# Patient Record
Sex: MALE | Race: BLACK OR AFRICAN AMERICAN | NOT HISPANIC OR LATINO | Employment: OTHER | URBAN - METROPOLITAN AREA
[De-identification: names, ages, dates, MRNs, and addresses within clinical notes are randomized per-mention and may not be internally consistent; named-entity substitution may affect disease eponyms.]

---

## 2023-11-21 DIAGNOSIS — E55.9 VITAMIN D DEFICIENCY: Primary | ICD-10-CM

## 2023-11-21 DIAGNOSIS — E11.69 TYPE 2 DIABETES MELLITUS WITH OTHER SPECIFIED COMPLICATION, WITH LONG-TERM CURRENT USE OF INSULIN (MULTI): Primary | ICD-10-CM

## 2023-11-21 DIAGNOSIS — Z79.4 TYPE 2 DIABETES MELLITUS WITH OTHER SPECIFIED COMPLICATION, WITH LONG-TERM CURRENT USE OF INSULIN (MULTI): Primary | ICD-10-CM

## 2023-11-21 RX ORDER — ERGOCALCIFEROL 1.25 MG/1
1 CAPSULE ORAL
Qty: 12 CAPSULE | Refills: 0 | Status: SHIPPED | OUTPATIENT
Start: 2023-11-21 | End: 2023-12-05

## 2023-11-21 RX ORDER — INSULIN DETEMIR 100 [IU]/ML
INJECTION, SOLUTION SUBCUTANEOUS
Qty: 1 EACH | Refills: 3 | Status: SHIPPED | OUTPATIENT
Start: 2023-11-21 | End: 2024-01-15 | Stop reason: SDUPTHER

## 2023-11-23 DIAGNOSIS — N40.0 BENIGN PROSTATIC HYPERPLASIA WITHOUT LOWER URINARY TRACT SYMPTOMS: ICD-10-CM

## 2023-12-04 DIAGNOSIS — E10.69 TYPE 1 DIABETES MELLITUS WITH OTHER SPECIFIED COMPLICATION (MULTI): ICD-10-CM

## 2023-12-04 DIAGNOSIS — E55.9 VITAMIN D DEFICIENCY: ICD-10-CM

## 2023-12-04 RX ORDER — TAMSULOSIN HYDROCHLORIDE 0.4 MG/1
0.4 CAPSULE ORAL DAILY
Qty: 90 CAPSULE | Refills: 3 | Status: SHIPPED | OUTPATIENT
Start: 2023-12-04 | End: 2024-01-25 | Stop reason: SDUPTHER

## 2023-12-04 NOTE — TELEPHONE ENCOUNTER
Patient also requesting Rx for HumaLOG KwikPen (100 UNIT/ML Solution Pen-injector)20 units with a meal Three times a day , Subcutaneous to [facility].

## 2023-12-05 RX ORDER — ERGOCALCIFEROL 1.25 MG/1
1 CAPSULE ORAL
Qty: 12 CAPSULE | Refills: 0 | Status: SHIPPED | OUTPATIENT
Start: 2023-12-05 | End: 2024-01-25 | Stop reason: SDUPTHER

## 2023-12-05 RX ORDER — INSULIN LISPRO 100 [IU]/ML
INJECTION, SOLUTION INTRAVENOUS; SUBCUTANEOUS
Qty: 5 EACH | Refills: 1 | Status: SHIPPED | OUTPATIENT
Start: 2023-12-05 | End: 2024-01-25 | Stop reason: SDUPTHER

## 2024-01-02 PROBLEM — E10.9 TYPE 1 DIABETES MELLITUS (MULTI): Status: ACTIVE | Noted: 2024-01-02

## 2024-01-02 PROBLEM — I73.9 PERIPHERAL VASCULAR DISEASE (CMS-HCC): Status: ACTIVE | Noted: 2024-01-02

## 2024-01-02 PROBLEM — I87.2 VENOUS INSUFFICIENCY: Status: ACTIVE | Noted: 2024-01-02

## 2024-01-02 PROBLEM — Z86.73 HISTORY OF ISCHEMIC STROKE: Status: ACTIVE | Noted: 2024-01-02

## 2024-01-02 PROBLEM — E11.9 TYPE 2 DIABETES MELLITUS (MULTI): Status: ACTIVE | Noted: 2024-01-02

## 2024-01-02 PROBLEM — G47.33 OSA (OBSTRUCTIVE SLEEP APNEA): Status: ACTIVE | Noted: 2024-01-02

## 2024-01-02 PROBLEM — N40.0 BENIGN PROSTATIC HYPERPLASIA WITHOUT URINARY OBSTRUCTION: Status: ACTIVE | Noted: 2024-01-02

## 2024-01-02 PROBLEM — N18.9 CHRONIC KIDNEY DISEASE: Status: ACTIVE | Noted: 2024-01-02

## 2024-01-02 PROBLEM — D47.2 MONOCLONAL GAMMOPATHY OF UNDETERMINED SIGNIFICANCE: Status: ACTIVE | Noted: 2024-01-02

## 2024-01-02 PROBLEM — E66.9 OBESITY: Status: ACTIVE | Noted: 2024-01-02

## 2024-01-02 PROBLEM — I25.10 CORONARY ARTERY DISEASE: Status: ACTIVE | Noted: 2024-01-02

## 2024-01-02 PROBLEM — I67.9 CEREBROVASCULAR DISEASE: Status: ACTIVE | Noted: 2024-01-02

## 2024-01-02 PROBLEM — M15.0 PRIMARY OSTEOARTHRITIS INVOLVING MULTIPLE JOINTS: Status: ACTIVE | Noted: 2024-01-02

## 2024-01-02 PROBLEM — K75.81 NONALCOHOLIC STEATOHEPATITIS (NASH): Status: ACTIVE | Noted: 2024-01-02

## 2024-01-02 PROBLEM — E55.9 VITAMIN D DEFICIENCY: Status: ACTIVE | Noted: 2024-01-02

## 2024-01-02 PROBLEM — E78.2 MIXED HYPERLIPIDEMIA: Status: ACTIVE | Noted: 2024-01-02

## 2024-01-02 PROBLEM — I10 ESSENTIAL HYPERTENSION: Status: ACTIVE | Noted: 2024-01-02

## 2024-01-02 PROBLEM — M15.9 PRIMARY OSTEOARTHRITIS INVOLVING MULTIPLE JOINTS: Status: ACTIVE | Noted: 2024-01-02

## 2024-01-02 PROBLEM — R53.81 PHYSICAL DEBILITY: Status: ACTIVE | Noted: 2024-01-02

## 2024-01-02 PROBLEM — I25.2 HISTORY OF ST ELEVATION MYOCARDIAL INFARCTION (STEMI): Status: ACTIVE | Noted: 2024-01-02

## 2024-01-13 DIAGNOSIS — Z79.4 TYPE 2 DIABETES MELLITUS WITH OTHER SPECIFIED COMPLICATION, WITH LONG-TERM CURRENT USE OF INSULIN (MULTI): ICD-10-CM

## 2024-01-13 DIAGNOSIS — E11.69 TYPE 2 DIABETES MELLITUS WITH OTHER SPECIFIED COMPLICATION, WITH LONG-TERM CURRENT USE OF INSULIN (MULTI): ICD-10-CM

## 2024-01-15 RX ORDER — INSULIN DETEMIR 100 [IU]/ML
INJECTION, SOLUTION SUBCUTANEOUS
Qty: 15 ML | Refills: 1 | Status: SHIPPED | OUTPATIENT
Start: 2024-01-15 | End: 2024-01-25 | Stop reason: SDUPTHER

## 2024-01-25 ENCOUNTER — LAB (OUTPATIENT)
Dept: LAB | Facility: LAB | Age: 69
End: 2024-01-25
Payer: MEDICARE

## 2024-01-25 ENCOUNTER — OFFICE VISIT (OUTPATIENT)
Dept: PRIMARY CARE | Facility: CLINIC | Age: 69
End: 2024-01-25
Payer: MEDICARE

## 2024-01-25 VITALS
WEIGHT: 291 LBS | OXYGEN SATURATION: 95 % | BODY MASS INDEX: 36.37 KG/M2 | DIASTOLIC BLOOD PRESSURE: 66 MMHG | SYSTOLIC BLOOD PRESSURE: 134 MMHG | TEMPERATURE: 98.1 F | HEART RATE: 87 BPM

## 2024-01-25 DIAGNOSIS — G47.33 OSA (OBSTRUCTIVE SLEEP APNEA): ICD-10-CM

## 2024-01-25 DIAGNOSIS — Z01.89 ENCOUNTER FOR ROUTINE LABORATORY TESTING: ICD-10-CM

## 2024-01-25 DIAGNOSIS — I67.9 CEREBROVASCULAR DISEASE: ICD-10-CM

## 2024-01-25 DIAGNOSIS — Z12.5 ENCOUNTER FOR PROSTATE CANCER SCREENING: ICD-10-CM

## 2024-01-25 DIAGNOSIS — M15.9 PRIMARY OSTEOARTHRITIS INVOLVING MULTIPLE JOINTS: ICD-10-CM

## 2024-01-25 DIAGNOSIS — K75.81 NONALCOHOLIC STEATOHEPATITIS (NASH): ICD-10-CM

## 2024-01-25 DIAGNOSIS — R53.81 PHYSICAL DEBILITY: ICD-10-CM

## 2024-01-25 DIAGNOSIS — Z79.4 LONG TERM CURRENT USE OF INSULIN (MULTI): ICD-10-CM

## 2024-01-25 DIAGNOSIS — I10 ESSENTIAL HYPERTENSION: ICD-10-CM

## 2024-01-25 DIAGNOSIS — N18.4 STAGE 4 CHRONIC KIDNEY DISEASE (MULTI): ICD-10-CM

## 2024-01-25 DIAGNOSIS — I25.10 CORONARY ARTERY DISEASE INVOLVING NATIVE CORONARY ARTERY OF NATIVE HEART WITHOUT ANGINA PECTORIS: ICD-10-CM

## 2024-01-25 DIAGNOSIS — E78.2 MIXED HYPERLIPIDEMIA: ICD-10-CM

## 2024-01-25 DIAGNOSIS — E66.01 CLASS 2 SEVERE OBESITY WITH SERIOUS COMORBIDITY AND BODY MASS INDEX (BMI) OF 36.0 TO 36.9 IN ADULT, UNSPECIFIED OBESITY TYPE (MULTI): ICD-10-CM

## 2024-01-25 DIAGNOSIS — E55.9 VITAMIN D DEFICIENCY: ICD-10-CM

## 2024-01-25 DIAGNOSIS — E10.69 TYPE 1 DIABETES MELLITUS WITH OTHER SPECIFIED COMPLICATION (MULTI): Primary | ICD-10-CM

## 2024-01-25 DIAGNOSIS — N40.0 BENIGN PROSTATIC HYPERPLASIA WITHOUT LOWER URINARY TRACT SYMPTOMS: ICD-10-CM

## 2024-01-25 LAB
ALBUMIN SERPL-MCNC: 3.9 G/DL (ref 3.5–5)
ALP BLD-CCNC: 90 U/L (ref 35–125)
ALT SERPL-CCNC: 6 U/L (ref 5–40)
ANION GAP SERPL CALC-SCNC: 10 MMOL/L
AST SERPL-CCNC: 11 U/L (ref 5–40)
BASOPHILS # BLD AUTO: 0.04 X10*3/UL (ref 0–0.1)
BASOPHILS NFR BLD AUTO: 0.5 %
BILIRUB DIRECT SERPL-MCNC: <0.2 MG/DL (ref 0–0.2)
BILIRUB SERPL-MCNC: 0.4 MG/DL (ref 0.1–1.2)
BUN SERPL-MCNC: 25 MG/DL (ref 8–25)
CALCIUM SERPL-MCNC: 8.7 MG/DL (ref 8.5–10.4)
CHLORIDE SERPL-SCNC: 104 MMOL/L (ref 97–107)
CO2 SERPL-SCNC: 26 MMOL/L (ref 24–31)
CREAT SERPL-MCNC: 2.3 MG/DL (ref 0.4–1.6)
EGFRCR SERPLBLD CKD-EPI 2021: 30 ML/MIN/1.73M*2
EOSINOPHIL # BLD AUTO: 0.45 X10*3/UL (ref 0–0.7)
EOSINOPHIL NFR BLD AUTO: 5.3 %
ERYTHROCYTE [DISTWIDTH] IN BLOOD BY AUTOMATED COUNT: 13.9 % (ref 11.5–14.5)
GLUCOSE SERPL-MCNC: 202 MG/DL (ref 65–99)
HCT VFR BLD AUTO: 42 % (ref 41–52)
HGB BLD-MCNC: 13.4 G/DL (ref 13.5–17.5)
IMM GRANULOCYTES # BLD AUTO: 0.02 X10*3/UL (ref 0–0.7)
IMM GRANULOCYTES NFR BLD AUTO: 0.2 % (ref 0–0.9)
LYMPHOCYTES # BLD AUTO: 1.88 X10*3/UL (ref 1.2–4.8)
LYMPHOCYTES NFR BLD AUTO: 22.2 %
MCH RBC QN AUTO: 28.7 PG (ref 26–34)
MCHC RBC AUTO-ENTMCNC: 31.9 G/DL (ref 32–36)
MCV RBC AUTO: 90 FL (ref 80–100)
MONOCYTES # BLD AUTO: 0.59 X10*3/UL (ref 0.1–1)
MONOCYTES NFR BLD AUTO: 7 %
NEUTROPHILS # BLD AUTO: 5.48 X10*3/UL (ref 1.2–7.7)
NEUTROPHILS NFR BLD AUTO: 64.8 %
NRBC BLD-RTO: 0 /100 WBCS (ref 0–0)
PLATELET # BLD AUTO: 211 X10*3/UL (ref 150–450)
POTASSIUM SERPL-SCNC: 4.6 MMOL/L (ref 3.4–5.1)
PROT SERPL-MCNC: 7.6 G/DL (ref 5.9–7.9)
RBC # BLD AUTO: 4.67 X10*6/UL (ref 4.5–5.9)
SODIUM SERPL-SCNC: 140 MMOL/L (ref 133–145)
WBC # BLD AUTO: 8.5 X10*3/UL (ref 4.4–11.3)

## 2024-01-25 PROCEDURE — 36415 COLL VENOUS BLD VENIPUNCTURE: CPT

## 2024-01-25 PROCEDURE — 1036F TOBACCO NON-USER: CPT | Performed by: STUDENT IN AN ORGANIZED HEALTH CARE EDUCATION/TRAINING PROGRAM

## 2024-01-25 PROCEDURE — 3075F SYST BP GE 130 - 139MM HG: CPT | Performed by: STUDENT IN AN ORGANIZED HEALTH CARE EDUCATION/TRAINING PROGRAM

## 2024-01-25 PROCEDURE — 3078F DIAST BP <80 MM HG: CPT | Performed by: STUDENT IN AN ORGANIZED HEALTH CARE EDUCATION/TRAINING PROGRAM

## 2024-01-25 PROCEDURE — 1160F RVW MEDS BY RX/DR IN RCRD: CPT | Performed by: STUDENT IN AN ORGANIZED HEALTH CARE EDUCATION/TRAINING PROGRAM

## 2024-01-25 PROCEDURE — 99215 OFFICE O/P EST HI 40 MIN: CPT | Performed by: STUDENT IN AN ORGANIZED HEALTH CARE EDUCATION/TRAINING PROGRAM

## 2024-01-25 PROCEDURE — 1126F AMNT PAIN NOTED NONE PRSNT: CPT | Performed by: STUDENT IN AN ORGANIZED HEALTH CARE EDUCATION/TRAINING PROGRAM

## 2024-01-25 PROCEDURE — 4010F ACE/ARB THERAPY RXD/TAKEN: CPT | Performed by: STUDENT IN AN ORGANIZED HEALTH CARE EDUCATION/TRAINING PROGRAM

## 2024-01-25 PROCEDURE — 1159F MED LIST DOCD IN RCRD: CPT | Performed by: STUDENT IN AN ORGANIZED HEALTH CARE EDUCATION/TRAINING PROGRAM

## 2024-01-25 PROCEDURE — 3008F BODY MASS INDEX DOCD: CPT | Performed by: STUDENT IN AN ORGANIZED HEALTH CARE EDUCATION/TRAINING PROGRAM

## 2024-01-25 PROCEDURE — 80053 COMPREHEN METABOLIC PANEL: CPT

## 2024-01-25 PROCEDURE — 85025 COMPLETE CBC W/AUTO DIFF WBC: CPT

## 2024-01-25 PROCEDURE — 82248 BILIRUBIN DIRECT: CPT

## 2024-01-25 RX ORDER — PEN NEEDLE, DIABETIC 29 G X1/2"
NEEDLE, DISPOSABLE MISCELLANEOUS
COMMUNITY
Start: 2023-11-21

## 2024-01-25 RX ORDER — CLOPIDOGREL BISULFATE 75 MG/1
75 TABLET ORAL DAILY
Start: 2024-01-25

## 2024-01-25 RX ORDER — CLOPIDOGREL BISULFATE 75 MG/1
75 TABLET ORAL DAILY
COMMUNITY
Start: 2023-11-21 | End: 2024-01-25 | Stop reason: SDUPTHER

## 2024-01-25 RX ORDER — NAPROXEN SODIUM 220 MG/1
81 TABLET, FILM COATED ORAL DAILY
COMMUNITY
End: 2024-01-25 | Stop reason: SDUPTHER

## 2024-01-25 RX ORDER — ERGOCALCIFEROL 1.25 MG/1
1 CAPSULE ORAL
Start: 2024-01-25

## 2024-01-25 RX ORDER — INSULIN LISPRO 100 [IU]/ML
INJECTION, SOLUTION INTRAVENOUS; SUBCUTANEOUS
Start: 2024-01-25 | End: 2024-02-01 | Stop reason: SDUPTHER

## 2024-01-25 RX ORDER — MECLIZINE HYDROCHLORIDE 25 MG/1
25 TABLET ORAL AS NEEDED
COMMUNITY
Start: 2015-02-17

## 2024-01-25 RX ORDER — FINASTERIDE 5 MG/1
1 TABLET, FILM COATED ORAL DAILY
COMMUNITY
Start: 2021-10-06 | End: 2024-01-25 | Stop reason: SDUPTHER

## 2024-01-25 RX ORDER — KETOCONAZOLE 20 MG/G
CREAM TOPICAL AS NEEDED
COMMUNITY
Start: 2015-08-17

## 2024-01-25 RX ORDER — IRBESARTAN 300 MG/1
300 TABLET ORAL EVERY 24 HOURS
COMMUNITY
End: 2024-01-25 | Stop reason: SDUPTHER

## 2024-01-25 RX ORDER — IRBESARTAN 300 MG/1
300 TABLET ORAL DAILY
Start: 2024-01-25

## 2024-01-25 RX ORDER — BLOOD SUGAR DIAGNOSTIC
STRIP MISCELLANEOUS
COMMUNITY
Start: 2021-03-12

## 2024-01-25 RX ORDER — TRAMADOL HYDROCHLORIDE 50 MG/1
25 TABLET ORAL AS NEEDED
COMMUNITY
Start: 2022-12-01

## 2024-01-25 RX ORDER — LABETALOL 100 MG/1
1 TABLET, FILM COATED ORAL 2 TIMES DAILY
Start: 2024-01-25

## 2024-01-25 RX ORDER — INSULIN DETEMIR 100 [IU]/ML
70 INJECTION, SOLUTION SUBCUTANEOUS EVERY MORNING
Start: 2024-01-25 | End: 2024-02-01 | Stop reason: SDUPTHER

## 2024-01-25 RX ORDER — FINASTERIDE 5 MG/1
5 TABLET, FILM COATED ORAL DAILY
Start: 2024-01-25

## 2024-01-25 RX ORDER — LABETALOL 100 MG/1
1 TABLET, FILM COATED ORAL 2 TIMES DAILY
COMMUNITY
Start: 2023-01-26 | End: 2024-01-25 | Stop reason: SDUPTHER

## 2024-01-25 RX ORDER — TAMSULOSIN HYDROCHLORIDE 0.4 MG/1
0.4 CAPSULE ORAL DAILY
Start: 2024-01-25

## 2024-01-25 RX ORDER — NAPROXEN SODIUM 220 MG/1
81 TABLET, FILM COATED ORAL DAILY
Start: 2024-01-25

## 2024-01-25 ASSESSMENT — PAIN SCALES - GENERAL: PAINLEVEL: 0-NO PAIN

## 2024-01-25 ASSESSMENT — ENCOUNTER SYMPTOMS
OCCASIONAL FEELINGS OF UNSTEADINESS: 0
DEPRESSION: 0
RESPIRATORY NEGATIVE: 1
GASTROINTESTINAL NEGATIVE: 1
CONSTITUTIONAL NEGATIVE: 1
CARDIOVASCULAR NEGATIVE: 1
LOSS OF SENSATION IN FEET: 0

## 2024-01-25 ASSESSMENT — PATIENT HEALTH QUESTIONNAIRE - PHQ9
SUM OF ALL RESPONSES TO PHQ9 QUESTIONS 1 AND 2: 0
2. FEELING DOWN, DEPRESSED OR HOPELESS: NOT AT ALL
1. LITTLE INTEREST OR PLEASURE IN DOING THINGS: NOT AT ALL

## 2024-01-25 NOTE — PATIENT INSTRUCTIONS
Diagnoses and all orders for this visit:  Type 1 diabetes mellitus with other specified complication (CMS/[facility])  -     insulin lispro (HumaLOG KwikPen Insulin) 100 unit/mL injection; Inject 20 Units under the skin once daily with breakfast AND 20 Units once daily at noon. Take with meals AND 20 Units once daily in the evening. Take with meals. Take as directed per insulin instructions.  -     insulin detemir (Levemir FlexTouch U100 Insulin) 100 unit/mL (3 mL) pen; Inject 70 Units under the skin once daily in the morning. Take as directed per insulin instructions.  -     Referral to Endocrinology; Future  -     Hemoglobin A1C; Future  -     TSH with reflex to Free T4 if abnormal; Future  -     Albumin , Urine Random; Future  Long term current use of insulin (CMS/[facility])  -     Referral to Endocrinology; Future  Essential hypertension  -     irbesartan (Avapro) 300 mg tablet; Take 1 tablet (300 mg) by mouth once daily.  -     labetalol (Normodyne) 100 mg tablet; Take 1 tablet (100 mg) by mouth 2 times a day.  Mixed hyperlipidemia  -     Lipid Panel; Future  Class 2 severe obesity with serious comorbidity and body mass index (BMI) of 36.0 to 36.9 in adult, unspecified obesity type (CMS/[facility])  Benign prostatic hyperplasia without lower urinary tract symptoms  -     finasteride (Proscar) 5 mg tablet; Take 1 tablet (5 mg) by mouth once daily.  -     tamsulosin (Flomax) 0.4 mg 24 hr capsule; Take 1 capsule (0.4 mg) by mouth once daily.  Coronary artery disease involving native coronary artery of native heart without angina pectoris  -     aspirin 81 mg chewable tablet; Chew 1 tablet (81 mg) once daily.  -     clopidogrel (Plavix) 75 mg tablet; Take 1 tablet (75 mg) by mouth once daily.  -     irbesartan (Avapro) 300 mg tablet; Take 1 tablet (300 mg) by mouth once daily.  -     labetalol (Normodyne) 100 mg tablet; Take 1 tablet (100 mg) by mouth 2 times a day.  Primary osteoarthritis involving multiple joints  Stage 4 chronic  kidney disease (CMS/HCC)  -     irbesartan (Avapro) 300 mg tablet; Take 1 tablet (300 mg) by mouth once daily.  -     CBC and Auto Differential; Future  -     Basic Metabolic Panel; Future  -     CBC and Auto Differential; Future  -     Basic Metabolic Panel; Future  Nonalcoholic steatohepatitis (BADILLO)  -     Hepatic Function Panel; Future  JONATHAN (obstructive sleep apnea)  Cerebrovascular disease  -     aspirin 81 mg chewable tablet; Chew 1 tablet (81 mg) once daily.  -     clopidogrel (Plavix) 75 mg tablet; Take 1 tablet (75 mg) by mouth once daily.  Vitamin D deficiency  -     ergocalciferol (Vitamin D-2) 1.25 MG (12538 UT) capsule; Take 1 capsule (1,250 mcg) by mouth 1 (one) time per week.  -     Vitamin D 25-Hydroxy,Total (for eval of Vitamin D levels); Future  Physical debility  Encounter for routine laboratory testing  -     CBC and Auto Differential; Future  -     Basic Metabolic Panel; Future  -     Hepatic Function Panel; Future  -     CBC and Auto Differential; Future  -     Basic Metabolic Panel; Future  -     Hepatic Function Panel; Future  -     Lipid Panel; Future  -     Hemoglobin A1C; Future  -     Vitamin D 25-Hydroxy,Total (for eval of Vitamin D levels); Future  -     TSH with reflex to Free T4 if abnormal; Future  -     Prostate Specific Antigen; Future  -     Albumin , Urine Random; Future  -     Follow Up In Primary Care; Future  Encounter for prostate cancer screening  -     Prostate Specific Antigen; Future    - Significant medication and problem list reconciliation done today.  - Unclear whether or not the patient is taking his insulin correctly still. Prior to the visit, we had on documentation that the patient was to take humalog 35/35/35 alongside levemir 95u once/day; however, patient notes that he is instead been doing 25/25/25 and 60u.  - This discrepancy would explain the frequent daily low blood sugars that we are seeing on his CGM.   - Patient is becoming very difficult to treat. I  feel that he needs to see an endocrinologist for further input on his sugars. Referral placed.   - For now, will increase the basal insulin and lower the meal-time insulin to hopefully prevent the frequent low blood sugars. Patient agreeable to the plan.  - Blood pressure stable. No changes at this time.  - Encouraged continued diet and exercise modification.  - Patient notes that he is not wearing his CPAP at this time. Encouraged compliance with CPAP.  - Pain well-controlled. No changes at this time.  - Patient is due for labwork. Ordered today. Will also order labwork for three months from now.  - Otherwise, the patient feels well. Do not need to make any significant changes at this time.

## 2024-01-25 NOTE — PROGRESS NOTES
[facility]: [facility]  PROGRESS NOTE      Tony King is a 68 y.o. male that is presenting today for Follow-up.    Assessment/Plan   Diagnoses and all orders for this visit:  Type 1 diabetes mellitus with other specified complication (CMS/[facility])  -     insulin lispro (HumaLOG KwikPen Insulin) 100 unit/mL injection; Inject 20 Units under the skin once daily with breakfast AND 20 Units once daily at noon. Take with meals AND 20 Units once daily in the evening. Take with meals. Take as directed per insulin instructions.  -     insulin detemir (Levemir FlexTouch U100 Insulin) 100 unit/mL (3 mL) pen; Inject 70 Units under the skin once daily in the morning. Take as directed per insulin instructions.  -     Referral to Endocrinology; Future  -     Hemoglobin A1C; Future  -     TSH with reflex to Free T4 if abnormal; Future  -     Albumin , Urine Random; Future  Long term current use of insulin (CMS/[facility])  -     Referral to Endocrinology; Future  Essential hypertension  -     irbesartan (Avapro) 300 mg tablet; Take 1 tablet (300 mg) by mouth once daily.  -     labetalol (Normodyne) 100 mg tablet; Take 1 tablet (100 mg) by mouth 2 times a day.  Mixed hyperlipidemia  -     Lipid Panel; Future  Class 2 severe obesity with serious comorbidity and body mass index (BMI) of 36.0 to 36.9 in adult, unspecified obesity type (CMS/[facility])  Benign prostatic hyperplasia without lower urinary tract symptoms  -     finasteride (Proscar) 5 mg tablet; Take 1 tablet (5 mg) by mouth once daily.  -     tamsulosin (Flomax) 0.4 mg 24 hr capsule; Take 1 capsule (0.4 mg) by mouth once daily.  Coronary artery disease involving native coronary artery of native heart without angina pectoris  -     aspirin 81 mg chewable tablet; Chew 1 tablet (81 mg) once daily.  -     clopidogrel (Plavix) 75 mg tablet; Take 1 tablet (75 mg) by mouth once daily.  -     irbesartan (Avapro) 300 mg tablet; Take 1 tablet (300 mg) by mouth once daily.  -      labetalol (Normodyne) 100 mg tablet; Take 1 tablet (100 mg) by mouth 2 times a day.  Primary osteoarthritis involving multiple joints  Stage 4 chronic kidney disease (CMS/HCC)  -     irbesartan (Avapro) 300 mg tablet; Take 1 tablet (300 mg) by mouth once daily.  -     CBC and Auto Differential; Future  -     Basic Metabolic Panel; Future  -     CBC and Auto Differential; Future  -     Basic Metabolic Panel; Future  Nonalcoholic steatohepatitis (BADILLO)  -     Hepatic Function Panel; Future  JONATHAN (obstructive sleep apnea)  Cerebrovascular disease  -     aspirin 81 mg chewable tablet; Chew 1 tablet (81 mg) once daily.  -     clopidogrel (Plavix) 75 mg tablet; Take 1 tablet (75 mg) by mouth once daily.  Vitamin D deficiency  -     ergocalciferol (Vitamin D-2) 1.25 MG (54102 UT) capsule; Take 1 capsule (1,250 mcg) by mouth 1 (one) time per week.  -     Vitamin D 25-Hydroxy,Total (for eval of Vitamin D levels); Future  Physical debility  Encounter for routine laboratory testing  -     CBC and Auto Differential; Future  -     Basic Metabolic Panel; Future  -     Hepatic Function Panel; Future  -     CBC and Auto Differential; Future  -     Basic Metabolic Panel; Future  -     Hepatic Function Panel; Future  -     Lipid Panel; Future  -     Hemoglobin A1C; Future  -     Vitamin D 25-Hydroxy,Total (for eval of Vitamin D levels); Future  -     TSH with reflex to Free T4 if abnormal; Future  -     Prostate Specific Antigen; Future  -     Albumin , Urine Random; Future  -     Follow Up In Primary Care; Future  Encounter for prostate cancer screening  -     Prostate Specific Antigen; Future    - Significant medication and problem list reconciliation done today.  - Unclear whether or not the patient is taking his insulin correctly still. Prior to the visit, we had on documentation that the patient was to take humalog 35/35/35 alongside levemir 95u once/day; however, patient notes that he is instead been doing 25/25/25 and  60u.  - This discrepancy would explain the frequent daily low blood sugars that we are seeing on his CGM.   - Patient is becoming very difficult to treat. I feel that he needs to see an endocrinologist for further input on his sugars. Referral placed.   - For now, will increase the basal insulin and lower the meal-time insulin to hopefully prevent the frequent low blood sugars. Patient agreeable to the plan.  - Blood pressure stable. No changes at this time.  - Encouraged continued diet and exercise modification.  - Patient notes that he is not wearing his CPAP at this time. Encouraged compliance with CPAP.  - Pain well-controlled. No changes at this time.  - Patient is due for labwork. Ordered today. Will also order labwork for three months from now.  - Otherwise, the patient feels well. Do not need to make any significant changes at this time.    Subjective   - The patient otherwise feels well and denies any acute symptoms or concerns at this time.  - The patient denies any changes or progression of their chronic medical problems.  - The patient denies any problems or concerns with their medications.      Review of Systems   Constitutional: Negative.    Respiratory: Negative.     Cardiovascular: Negative.    Gastrointestinal: Negative.       Objective   Vitals:    01/25/24 1407   BP: 134/66   Pulse: 87   Temp: 36.7 °C (98.1 °F)   SpO2: 95%      Body mass index is 36.37 kg/m².  Physical Exam  Constitutional:       General: He is not in acute distress.  Neck:      Vascular: No carotid bruit.   Cardiovascular:      Rate and Rhythm: Normal rate and regular rhythm.      Heart sounds: Normal heart sounds.   Pulmonary:      Effort: Pulmonary effort is normal.      Breath sounds: Normal breath sounds.   Musculoskeletal:         General: No swelling.   Neurological:      Mental Status: He is alert. Mental status is at baseline.   Psychiatric:         Mood and Affect: Mood normal.       Diagnostic Results   Lab Results  "  Component Value Date    GLUCOSE 199 (H) 06/21/2023    CALCIUM 8.2 (L) 06/21/2023     06/21/2023    K 4.5 06/21/2023    CO2 21 (L) 06/21/2023     06/21/2023    BUN 38 (H) 06/21/2023    CREATININE 2.5 (H) 06/21/2023     Lab Results   Component Value Date    ALT 8 06/21/2023    AST 12 06/21/2023    ALKPHOS 97 06/21/2023    BILITOT 0.4 06/21/2023     Lab Results   Component Value Date    WBC 6.4 06/21/2023    HGB 13.6 06/21/2023    HCT 40.6 (L) 06/21/2023    MCV 86.4 06/21/2023     06/21/2023     Lab Results   Component Value Date    CHOL 182 06/21/2023    CHOL 153 12/23/2022    CHOL 245 (H) 11/29/2021     Lab Results   Component Value Date    HDL 38 (L) 06/21/2023    HDL 32 (L) 12/23/2022    HDL 35 (L) 11/29/2021     Lab Results   Component Value Date    LDLCALC 118 06/21/2023    LDLCALC 93 12/23/2022    LDLCALC 174 (H) 11/29/2021     Lab Results   Component Value Date    TRIG 131 06/21/2023    TRIG 138 12/23/2022    TRIG 179 (H) 11/29/2021     No components found for: \"CHOLHDL\"  Lab Results   Component Value Date    HGBA1C 7.3 (H) 06/21/2023     Other labs not included in the list above were reviewed either before or during this encounter.    History    Past Medical History:   Diagnosis Date    Chronic kidney disease, unspecified 09/26/2014    Chronic renal insufficiency    Essential (primary) hypertension 09/26/2014    Benign essential hypertension    Patent foramen ovale 09/26/2014    PFO (patent foramen ovale)    Personal history of other endocrine, nutritional and metabolic disease 09/26/2014    History of diabetes mellitus    Personal history of other endocrine, nutritional and metabolic disease 09/26/2014    History of hyperlipidemia    Personal history of other venous thrombosis and embolism 09/26/2014    History of deep venous thrombosis    Personal history of transient ischemic attack (TIA), and cerebral infarction without residual deficits 09/26/2014    History of stroke     Past " "Surgical History:   Procedure Laterality Date    US GUIDED PERCUTANEOUS PLACEMENT  7/20/2020    US GUIDED PERCUTANEOUS PLACEMENT [facility]     No family history on file.  Social History     Socioeconomic History    Marital status:      Spouse name: Not on file    Number of children: Not on file    Years of education: Not on file    Highest education level: Not on file   Occupational History    Not on file   Tobacco Use    Smoking status: Never    Smokeless tobacco: Never   Substance and Sexual Activity    Alcohol use: Never    Drug use: Never    Sexual activity: Not on file   Other Topics Concern    Not on file   Social History Narrative    Not on file     Social Determinants of Health     Financial Resource Strain: Not on file   Food Insecurity: Not on file   Transportation Needs: Not on file   Physical Activity: Not on file   Stress: Not on file   Social Connections: Not on file   Intimate Partner Violence: Not on file   Housing Stability: Not on file     Allergies   Allergen Reactions    Ciprofloxacin Unknown    Penicillins Unknown and Other     Current Outpatient Medications on File Prior to Visit   Medication Sig Dispense Refill    BD Ultra-Fine Orig Pen Needle 29 gauge x 1/2\" needle USE AS DIRECTED... 4 TIMES A DAY FOR 90 DAYS      blood sugar diagnostic (Contour Next Test Strips) strip USE AS DIRECTED 4 TIMES A DAY      ketoconazole (NIZOral) 2 % cream if needed.      meclizine (Antivert) 25 mg tablet Take 1 tablet (25 mg) by mouth if needed.      traMADol (Ultram) 50 mg tablet Take 0.5 tablets (25 mg) by mouth if needed.      [DISCONTINUED] aspirin 81 mg chewable tablet Chew 1 tablet (81 mg) once daily.      [DISCONTINUED] clopidogrel (Plavix) 75 mg tablet Take 1 tablet (75 mg) by mouth once daily.      [DISCONTINUED] ergocalciferol (Vitamin D-2) 1.25 MG (70438 UT) capsule TAKE 1 CAPSULE BY MOUTH ONE TIME PER WEEK 12 capsule 0    [DISCONTINUED] finasteride (Proscar) 5 mg tablet Take 1 tablet " (5 mg) by mouth once daily.      [DISCONTINUED] HumaLOG KwikPen Insulin 100 unit/mL injection INJECT 35 UNITS SUBCUTANEOUS THREE TIMES DAILY 7 DAYS 5 each 1    [DISCONTINUED] irbesartan (Avapro) 300 mg tablet Take 1 tablet (300 mg) by mouth once every 24 hours.      [DISCONTINUED] labetalol (Normodyne) 100 mg tablet Take 1 tablet (100 mg) by mouth 2 times a day.      [DISCONTINUED] Levemir FlexTouch U100 Insulin 100 unit/mL (3 mL) pen INJECT 95 UNITS SUBCUTANEOUS NIGHTLY 7 DAYS 15 mL 1    [DISCONTINUED] tamsulosin (Flomax) 0.4 mg 24 hr capsule Take 1 capsule (0.4 mg) by mouth once daily. 90 capsule 3     No current facility-administered medications on file prior to visit.     Immunization History   Administered Date(s) Administered    Moderna COVID-19 vaccine, Fall 2023, 12 yeasrs and older (50mcg/0.5mL) 10/10/2023    Moderna SARS-CoV-2 Booster 10/10/2023    Moderna SARS-CoV-2 Vaccination 03/30/2021, 04/27/2021, 01/22/2022, 08/06/2022    RSV, 60 Years And Older (AREXVY) 10/10/2023     Patient's medical history was reviewed and updated either before or during this encounter.       Mateusz Brooke MD

## 2024-02-01 DIAGNOSIS — E10.69 TYPE 1 DIABETES MELLITUS WITH OTHER SPECIFIED COMPLICATION (MULTI): ICD-10-CM

## 2024-02-01 RX ORDER — INSULIN LISPRO 100 [IU]/ML
INJECTION, SOLUTION INTRAVENOUS; SUBCUTANEOUS
Qty: 6 EACH | Refills: 3 | Status: SHIPPED | OUTPATIENT
Start: 2024-02-01

## 2024-02-01 RX ORDER — INSULIN DETEMIR 100 [IU]/ML
70 INJECTION, SOLUTION SUBCUTANEOUS EVERY MORNING
Qty: 7 EACH | Refills: 3 | Status: SHIPPED | OUTPATIENT
Start: 2024-02-01

## 2024-03-27 ENCOUNTER — APPOINTMENT (OUTPATIENT)
Dept: PRIMARY CARE | Facility: CLINIC | Age: 69
End: 2024-03-27
Payer: MEDICARE

## 2024-04-05 PROBLEM — M79.18 MUSCULOSKELETAL PAIN: Status: ACTIVE | Noted: 2024-04-05

## 2024-04-05 PROBLEM — L89.316 PRESSURE INJURY OF DEEP TISSUE OF RIGHT BUTTOCK: Status: ACTIVE | Noted: 2024-04-05

## 2024-04-05 PROBLEM — Z86.711 PERSONAL HISTORY OF PULMONARY EMBOLISM: Status: RESOLVED | Noted: 2022-12-22 | Resolved: 2024-04-05

## 2024-04-05 PROBLEM — R20.0 NUMBNESS: Status: ACTIVE | Noted: 2024-04-05

## 2024-04-05 PROBLEM — Q84.3: Status: ACTIVE | Noted: 2024-04-05

## 2024-04-05 PROBLEM — R07.9 CHEST PAIN: Status: RESOLVED | Noted: 2022-12-22 | Resolved: 2024-04-05

## 2024-04-05 PROBLEM — I96 GANGRENE OF FOOT (MULTI): Status: ACTIVE | Noted: 2024-04-05

## 2024-04-05 PROBLEM — R00.2 PALPITATIONS: Status: ACTIVE | Noted: 2024-04-05

## 2024-04-05 PROBLEM — L03.90 CELLULITIS: Status: RESOLVED | Noted: 2022-11-15 | Resolved: 2024-04-05

## 2024-04-05 PROBLEM — M62.82 RHABDOMYOLYSIS: Status: RESOLVED | Noted: 2024-04-05 | Resolved: 2024-04-05

## 2024-04-05 PROBLEM — R41.3 OTHER AMNESIA: Status: RESOLVED | Noted: 2022-11-11 | Resolved: 2024-04-05

## 2024-04-05 PROBLEM — R73.09 BLOOD GLUCOSE ABNORMAL: Status: RESOLVED | Noted: 2024-04-05 | Resolved: 2024-04-05

## 2024-04-05 PROBLEM — I70.229 CRITICAL LOWER LIMB ISCHEMIA (MULTI): Status: ACTIVE | Noted: 2024-04-05

## 2024-04-05 PROBLEM — Z86.39 HISTORY OF DIABETES MELLITUS: Status: RESOLVED | Noted: 2024-04-05 | Resolved: 2024-04-05

## 2024-04-05 PROBLEM — L89.309 PRESSURE SORE ON BUTTOCKS: Status: RESOLVED | Noted: 2022-11-15 | Resolved: 2024-04-05

## 2024-04-05 PROBLEM — R29.6 RECURRENT FALLS: Status: ACTIVE | Noted: 2024-04-05

## 2024-04-05 PROBLEM — M79.604 PAIN OF RIGHT LOWER EXTREMITY: Status: ACTIVE | Noted: 2024-04-05

## 2024-04-05 PROBLEM — N52.9 MALE ERECTILE DYSFUNCTION, UNSPECIFIED: Status: ACTIVE | Noted: 2024-04-05

## 2024-04-05 PROBLEM — M79.605 PAIN OF LEFT LOWER EXTREMITY: Status: ACTIVE | Noted: 2024-04-05

## 2024-04-05 PROBLEM — M62.9 DISORDER OF SKELETAL MUSCLE: Status: ACTIVE | Noted: 2024-04-05

## 2024-04-05 PROBLEM — Z96.652 PRESENCE OF LEFT ARTIFICIAL KNEE JOINT: Status: RESOLVED | Noted: 2022-12-22 | Resolved: 2024-04-05

## 2024-04-05 PROBLEM — R26.9 ABNORMAL GAIT: Status: ACTIVE | Noted: 2024-04-05

## 2024-04-05 PROBLEM — Z86.39 HISTORY OF ELEVATED LIPIDS: Status: RESOLVED | Noted: 2024-04-05 | Resolved: 2024-04-05

## 2024-04-05 PROBLEM — R25.1 TREMOR, UNSPECIFIED: Status: ACTIVE | Noted: 2022-11-11

## 2024-04-05 PROBLEM — M86.179 ACUTE OSTEOMYELITIS OF ANKLE OR FOOT (MULTI): Status: ACTIVE | Noted: 2022-12-22

## 2024-04-05 PROBLEM — Q21.12 PATENT FORAMEN OVALE (HHS-HCC): Status: ACTIVE | Noted: 2024-04-05

## 2024-04-05 PROBLEM — Z87.74: Status: RESOLVED | Noted: 2022-12-22 | Resolved: 2024-04-05

## 2024-04-05 PROBLEM — Z78.9 MEDICAL HOME PATIENT: Status: ACTIVE | Noted: 2024-04-05

## 2024-04-05 PROBLEM — N39.41 URGE INCONTINENCE OF URINE: Status: RESOLVED | Noted: 2024-04-05 | Resolved: 2024-04-05

## 2024-04-05 PROBLEM — R93.0 ABNORMAL MAGNETIC RESONANCE IMAGING OF HEAD: Status: ACTIVE | Noted: 2022-12-10

## 2024-04-05 PROBLEM — I21.4 ACUTE NON-ST SEGMENT ELEVATION MYOCARDIAL INFARCTION (MULTI): Status: RESOLVED | Noted: 2022-12-22 | Resolved: 2024-04-05

## 2024-04-05 PROBLEM — M15.9 GENERALIZED OSTEOARTHRITIS: Status: ACTIVE | Noted: 2024-04-05

## 2024-04-05 PROBLEM — F68.8: Status: ACTIVE | Noted: 2022-11-11

## 2024-04-05 PROBLEM — Z86.718 HISTORY OF DEEP VENOUS THROMBOSIS: Status: RESOLVED | Noted: 2022-12-22 | Resolved: 2024-04-05

## 2024-04-05 PROBLEM — M48.02 SPINAL STENOSIS OF CERVICAL REGION: Status: ACTIVE | Noted: 2022-11-04

## 2024-04-05 PROBLEM — E87.5 HYPERKALEMIA: Status: ACTIVE | Noted: 2022-12-22

## 2024-04-05 RX ORDER — AMOXICILLIN 250 MG
CAPSULE ORAL
COMMUNITY

## 2024-04-05 RX ORDER — AMLODIPINE BESYLATE 2.5 MG/1
TABLET ORAL
COMMUNITY

## 2024-04-05 RX ORDER — BLOOD-GLUCOSE,RECEIVER,CONT
EACH MISCELLANEOUS
COMMUNITY
Start: 2019-06-14

## 2024-04-05 RX ORDER — DOCUSATE SODIUM 100 MG/1
CAPSULE, LIQUID FILLED ORAL
COMMUNITY

## 2024-04-05 RX ORDER — MIRABEGRON 50 MG/1
TABLET, EXTENDED RELEASE ORAL
COMMUNITY
Start: 2020-09-11

## 2024-04-05 RX ORDER — PANTOPRAZOLE SODIUM 40 MG/1
TABLET, DELAYED RELEASE ORAL
COMMUNITY

## 2024-04-05 RX ORDER — HYDROCODONE BITARTRATE AND ACETAMINOPHEN 5; 325 MG/1; MG/1
TABLET ORAL
COMMUNITY

## 2024-04-05 RX ORDER — ATORVASTATIN CALCIUM 20 MG/1
TABLET, FILM COATED ORAL
COMMUNITY

## 2024-04-05 RX ORDER — BLOOD-GLUCOSE SENSOR
EACH MISCELLANEOUS
COMMUNITY
Start: 2019-06-14

## 2024-04-05 RX ORDER — BLOOD-GLUCOSE TRANSMITTER
EACH MISCELLANEOUS
COMMUNITY
Start: 2019-06-14

## 2025-01-18 ENCOUNTER — HOSPITAL ENCOUNTER (INPATIENT)
Facility: HOSPITAL | Age: 70
End: 2025-01-18
Attending: STUDENT IN AN ORGANIZED HEALTH CARE EDUCATION/TRAINING PROGRAM | Admitting: STUDENT IN AN ORGANIZED HEALTH CARE EDUCATION/TRAINING PROGRAM
Payer: MEDICARE

## 2025-01-18 ENCOUNTER — APPOINTMENT (OUTPATIENT)
Dept: RADIOLOGY | Facility: HOSPITAL | Age: 70
End: 2025-01-18
Payer: MEDICARE

## 2025-01-18 ENCOUNTER — APPOINTMENT (OUTPATIENT)
Dept: CARDIOLOGY | Facility: HOSPITAL | Age: 70
End: 2025-01-18
Payer: MEDICARE

## 2025-01-18 DIAGNOSIS — I45.9 HB (HEART BLOCK): ICD-10-CM

## 2025-01-18 DIAGNOSIS — R00.1 BRADYCARDIA: ICD-10-CM

## 2025-01-18 DIAGNOSIS — R29.6 FREQUENT FALLS: ICD-10-CM

## 2025-01-18 DIAGNOSIS — R53.1 GENERALIZED WEAKNESS: Primary | ICD-10-CM

## 2025-01-18 DIAGNOSIS — R94.31 ABNORMAL ELECTROCARDIOGRAM (ECG) (EKG): ICD-10-CM

## 2025-01-18 LAB
ALBUMIN SERPL BCP-MCNC: 3.6 G/DL (ref 3.4–5)
ALP SERPL-CCNC: 63 U/L (ref 33–136)
ALT SERPL W P-5'-P-CCNC: 18 U/L (ref 10–52)
AMPHETAMINES UR QL SCN: NORMAL
ANION GAP BLDV CALCULATED.4IONS-SCNC: 3 MMOL/L (ref 10–25)
ANION GAP SERPL CALCULATED.3IONS-SCNC: 12 MMOL/L (ref 10–20)
AST SERPL W P-5'-P-CCNC: 27 U/L (ref 9–39)
BARBITURATES UR QL SCN: NORMAL
BASE EXCESS BLDV CALC-SCNC: 5.6 MMOL/L (ref -2–3)
BASOPHILS # BLD AUTO: 0.04 X10*3/UL (ref 0–0.1)
BASOPHILS NFR BLD AUTO: 0.5 %
BENZODIAZ UR QL SCN: NORMAL
BILIRUB SERPL-MCNC: 0.4 MG/DL (ref 0–1.2)
BODY TEMPERATURE: 37 DEGREES CELSIUS
BUN SERPL-MCNC: 26 MG/DL (ref 6–23)
BZE UR QL SCN: NORMAL
CA-I BLDV-SCNC: 1.19 MMOL/L (ref 1.1–1.33)
CALCIUM SERPL-MCNC: 8.6 MG/DL (ref 8.6–10.3)
CANNABINOIDS UR QL SCN: NORMAL
CARDIAC TROPONIN I PNL SERPL HS: 15 NG/L (ref 0–20)
CARDIAC TROPONIN I PNL SERPL HS: 16 NG/L (ref 0–20)
CHLORIDE BLDV-SCNC: 107 MMOL/L (ref 98–107)
CHLORIDE SERPL-SCNC: 106 MMOL/L (ref 98–107)
CO2 SERPL-SCNC: 25 MMOL/L (ref 21–32)
CREAT SERPL-MCNC: 2.11 MG/DL (ref 0.5–1.3)
EGFRCR SERPLBLD CKD-EPI 2021: 33 ML/MIN/1.73M*2
EOSINOPHIL # BLD AUTO: 0.35 X10*3/UL (ref 0–0.4)
EOSINOPHIL NFR BLD AUTO: 4 %
ERYTHROCYTE [DISTWIDTH] IN BLOOD BY AUTOMATED COUNT: 13.8 % (ref 11.5–14.5)
FENTANYL+NORFENTANYL UR QL SCN: NORMAL
FLUAV RNA RESP QL NAA+PROBE: NOT DETECTED
FLUBV RNA RESP QL NAA+PROBE: NOT DETECTED
GLUCOSE BLD MANUAL STRIP-MCNC: 107 MG/DL (ref 74–99)
GLUCOSE BLDV-MCNC: 98 MG/DL (ref 74–99)
GLUCOSE SERPL-MCNC: 94 MG/DL (ref 74–99)
HCO3 BLDV-SCNC: 32.1 MMOL/L (ref 22–26)
HCT VFR BLD AUTO: 44.1 % (ref 41–52)
HCT VFR BLD EST: 43 % (ref 41–52)
HGB BLD-MCNC: 14.5 G/DL (ref 13.5–17.5)
HGB BLDV-MCNC: 14.3 G/DL (ref 13.5–17.5)
IMM GRANULOCYTES # BLD AUTO: 0.03 X10*3/UL (ref 0–0.5)
IMM GRANULOCYTES NFR BLD AUTO: 0.3 % (ref 0–0.9)
INHALED O2 CONCENTRATION: 0 %
LACTATE BLDV-SCNC: 1.5 MMOL/L (ref 0.4–2)
LYMPHOCYTES # BLD AUTO: 1.82 X10*3/UL (ref 0.8–3)
LYMPHOCYTES NFR BLD AUTO: 20.7 %
MCH RBC QN AUTO: 28.5 PG (ref 26–34)
MCHC RBC AUTO-ENTMCNC: 32.9 G/DL (ref 32–36)
MCV RBC AUTO: 87 FL (ref 80–100)
METHADONE UR QL SCN: NORMAL
MONOCYTES # BLD AUTO: 0.57 X10*3/UL (ref 0.05–0.8)
MONOCYTES NFR BLD AUTO: 6.5 %
NEUTROPHILS # BLD AUTO: 5.99 X10*3/UL (ref 1.6–5.5)
NEUTROPHILS NFR BLD AUTO: 68 %
NRBC BLD-RTO: 0 /100 WBCS (ref 0–0)
OPIATES UR QL SCN: NORMAL
OXYCODONE+OXYMORPHONE UR QL SCN: NORMAL
OXYHGB MFR BLDV: 37.2 % (ref 45–75)
PCO2 BLDV: 53 MM HG (ref 41–51)
PCP UR QL SCN: NORMAL
PH BLDV: 7.39 PH (ref 7.33–7.43)
PLATELET # BLD AUTO: 179 X10*3/UL (ref 150–450)
PO2 BLDV: 26 MM HG (ref 35–45)
POTASSIUM BLDV-SCNC: 4.5 MMOL/L (ref 3.5–5.3)
POTASSIUM SERPL-SCNC: 4 MMOL/L (ref 3.5–5.3)
PROT SERPL-MCNC: 8.1 G/DL (ref 6.4–8.2)
RBC # BLD AUTO: 5.08 X10*6/UL (ref 4.5–5.9)
SAO2 % BLDV: 38 % (ref 45–75)
SARS-COV-2 RNA RESP QL NAA+PROBE: NOT DETECTED
SODIUM BLDV-SCNC: 138 MMOL/L (ref 136–145)
SODIUM SERPL-SCNC: 139 MMOL/L (ref 136–145)
WBC # BLD AUTO: 8.8 X10*3/UL (ref 4.4–11.3)

## 2025-01-18 PROCEDURE — 2500000004 HC RX 250 GENERAL PHARMACY W/ HCPCS (ALT 636 FOR OP/ED): Performed by: STUDENT IN AN ORGANIZED HEALTH CARE EDUCATION/TRAINING PROGRAM

## 2025-01-18 PROCEDURE — 93005 ELECTROCARDIOGRAM TRACING: CPT

## 2025-01-18 PROCEDURE — 82947 ASSAY GLUCOSE BLOOD QUANT: CPT

## 2025-01-18 PROCEDURE — 84484 ASSAY OF TROPONIN QUANT: CPT | Performed by: PHYSICIAN ASSISTANT

## 2025-01-18 PROCEDURE — 84443 ASSAY THYROID STIM HORMONE: CPT | Performed by: STUDENT IN AN ORGANIZED HEALTH CARE EDUCATION/TRAINING PROGRAM

## 2025-01-18 PROCEDURE — 85025 COMPLETE CBC W/AUTO DIFF WBC: CPT | Performed by: PHYSICIAN ASSISTANT

## 2025-01-18 PROCEDURE — 71045 X-RAY EXAM CHEST 1 VIEW: CPT

## 2025-01-18 PROCEDURE — 84132 ASSAY OF SERUM POTASSIUM: CPT | Performed by: PHYSICIAN ASSISTANT

## 2025-01-18 PROCEDURE — 82550 ASSAY OF CK (CPK): CPT | Performed by: STUDENT IN AN ORGANIZED HEALTH CARE EDUCATION/TRAINING PROGRAM

## 2025-01-18 PROCEDURE — 70450 CT HEAD/BRAIN W/O DYE: CPT | Performed by: RADIOLOGY

## 2025-01-18 PROCEDURE — 81001 URINALYSIS AUTO W/SCOPE: CPT | Performed by: PHYSICIAN ASSISTANT

## 2025-01-18 PROCEDURE — 36415 COLL VENOUS BLD VENIPUNCTURE: CPT | Performed by: PHYSICIAN ASSISTANT

## 2025-01-18 PROCEDURE — 96374 THER/PROPH/DIAG INJ IV PUSH: CPT

## 2025-01-18 PROCEDURE — 80307 DRUG TEST PRSMV CHEM ANLYZR: CPT | Performed by: PHYSICIAN ASSISTANT

## 2025-01-18 PROCEDURE — 99285 EMERGENCY DEPT VISIT HI MDM: CPT | Mod: 25 | Performed by: STUDENT IN AN ORGANIZED HEALTH CARE EDUCATION/TRAINING PROGRAM

## 2025-01-18 PROCEDURE — 96376 TX/PRO/DX INJ SAME DRUG ADON: CPT

## 2025-01-18 PROCEDURE — 71045 X-RAY EXAM CHEST 1 VIEW: CPT | Mod: FOREIGN READ | Performed by: RADIOLOGY

## 2025-01-18 PROCEDURE — 70450 CT HEAD/BRAIN W/O DYE: CPT

## 2025-01-18 PROCEDURE — 84075 ASSAY ALKALINE PHOSPHATASE: CPT | Performed by: PHYSICIAN ASSISTANT

## 2025-01-18 PROCEDURE — 87636 SARSCOV2 & INF A&B AMP PRB: CPT | Performed by: PHYSICIAN ASSISTANT

## 2025-01-18 PROCEDURE — 93010 ELECTROCARDIOGRAM REPORT: CPT | Performed by: INTERNAL MEDICINE

## 2025-01-18 RX ORDER — NALOXONE HYDROCHLORIDE 1 MG/ML
1 INJECTION INTRAMUSCULAR; INTRAVENOUS; SUBCUTANEOUS ONCE
Status: COMPLETED | OUTPATIENT
Start: 2025-01-18 | End: 2025-01-18

## 2025-01-18 RX ORDER — NALOXONE HYDROCHLORIDE 1 MG/ML
2 INJECTION INTRAMUSCULAR; INTRAVENOUS; SUBCUTANEOUS ONCE
Status: COMPLETED | OUTPATIENT
Start: 2025-01-18 | End: 2025-01-18

## 2025-01-18 RX ADMIN — NALOXONE HYDROCHLORIDE 1 MG: 1 INJECTION PARENTERAL at 20:59

## 2025-01-18 RX ADMIN — NALOXONE HYDROCHLORIDE 2 MG: 1 INJECTION PARENTERAL at 22:11

## 2025-01-18 ASSESSMENT — PAIN - FUNCTIONAL ASSESSMENT: PAIN_FUNCTIONAL_ASSESSMENT: 0-10

## 2025-01-18 ASSESSMENT — PAIN SCALES - GENERAL
PAINLEVEL_OUTOF10: 0 - NO PAIN
PAINLEVEL_OUTOF10: 0 - NO PAIN

## 2025-01-18 ASSESSMENT — COLUMBIA-SUICIDE SEVERITY RATING SCALE - C-SSRS
1. IN THE PAST MONTH, HAVE YOU WISHED YOU WERE DEAD OR WISHED YOU COULD GO TO SLEEP AND NOT WAKE UP?: NO
6. HAVE YOU EVER DONE ANYTHING, STARTED TO DO ANYTHING, OR PREPARED TO DO ANYTHING TO END YOUR LIFE?: NO
2. HAVE YOU ACTUALLY HAD ANY THOUGHTS OF KILLING YOURSELF?: NO

## 2025-01-19 VITALS
WEIGHT: 260 LBS | OXYGEN SATURATION: 99 % | HEIGHT: 77 IN | TEMPERATURE: 97.3 F | DIASTOLIC BLOOD PRESSURE: 68 MMHG | HEART RATE: 60 BPM | SYSTOLIC BLOOD PRESSURE: 152 MMHG | BODY MASS INDEX: 30.7 KG/M2 | RESPIRATION RATE: 16 BRPM

## 2025-01-19 PROBLEM — R41.89 COGNITIVE CHANGE: Status: ACTIVE | Noted: 2025-01-19

## 2025-01-19 PROBLEM — R53.1 GENERALIZED WEAKNESS: Status: ACTIVE | Noted: 2025-01-19

## 2025-01-19 LAB
ANION GAP SERPL CALCULATED.3IONS-SCNC: 11 MMOL/L (ref 10–20)
APPEARANCE UR: CLEAR
BILIRUB UR STRIP.AUTO-MCNC: NEGATIVE MG/DL
BUN SERPL-MCNC: 23 MG/DL (ref 6–23)
CALCIUM SERPL-MCNC: 8.3 MG/DL (ref 8.6–10.3)
CHLORIDE SERPL-SCNC: 107 MMOL/L (ref 98–107)
CK SERPL-CCNC: 970 U/L (ref 0–325)
CO2 SERPL-SCNC: 24 MMOL/L (ref 21–32)
COLOR UR: NORMAL
CORTIS AM PEAK SERPL-MSCNC: 8.5 UG/DL (ref 5–20)
CREAT SERPL-MCNC: 1.95 MG/DL (ref 0.5–1.3)
EGFRCR SERPLBLD CKD-EPI 2021: 36 ML/MIN/1.73M*2
GLUCOSE BLD MANUAL STRIP-MCNC: 154 MG/DL (ref 74–99)
GLUCOSE BLD MANUAL STRIP-MCNC: 171 MG/DL (ref 74–99)
GLUCOSE BLD MANUAL STRIP-MCNC: 254 MG/DL (ref 74–99)
GLUCOSE BLD MANUAL STRIP-MCNC: 82 MG/DL (ref 74–99)
GLUCOSE SERPL-MCNC: 94 MG/DL (ref 74–99)
GLUCOSE UR STRIP.AUTO-MCNC: NORMAL MG/DL
KETONES UR STRIP.AUTO-MCNC: NEGATIVE MG/DL
LEUKOCYTE ESTERASE UR QL STRIP.AUTO: NEGATIVE
NITRITE UR QL STRIP.AUTO: NEGATIVE
PH UR STRIP.AUTO: 5.5 [PH]
POTASSIUM SERPL-SCNC: 4.1 MMOL/L (ref 3.5–5.3)
PROT UR STRIP.AUTO-MCNC: NORMAL MG/DL
RBC # UR STRIP.AUTO: NEGATIVE /UL
RBC #/AREA URNS AUTO: NORMAL /HPF
SODIUM SERPL-SCNC: 138 MMOL/L (ref 136–145)
SP GR UR STRIP.AUTO: 1.01
TSH SERPL-ACNC: 2.32 MIU/L (ref 0.44–3.98)
UROBILINOGEN UR STRIP.AUTO-MCNC: NORMAL MG/DL
WBC #/AREA URNS AUTO: NORMAL /HPF

## 2025-01-19 PROCEDURE — 80048 BASIC METABOLIC PNL TOTAL CA: CPT | Performed by: STUDENT IN AN ORGANIZED HEALTH CARE EDUCATION/TRAINING PROGRAM

## 2025-01-19 PROCEDURE — 97110 THERAPEUTIC EXERCISES: CPT | Mod: GP

## 2025-01-19 PROCEDURE — 82947 ASSAY GLUCOSE BLOOD QUANT: CPT

## 2025-01-19 PROCEDURE — 2500000001 HC RX 250 WO HCPCS SELF ADMINISTERED DRUGS (ALT 637 FOR MEDICARE OP): Performed by: STUDENT IN AN ORGANIZED HEALTH CARE EDUCATION/TRAINING PROGRAM

## 2025-01-19 PROCEDURE — 36415 COLL VENOUS BLD VENIPUNCTURE: CPT | Performed by: STUDENT IN AN ORGANIZED HEALTH CARE EDUCATION/TRAINING PROGRAM

## 2025-01-19 PROCEDURE — 97162 PT EVAL MOD COMPLEX 30 MIN: CPT | Mod: GP

## 2025-01-19 PROCEDURE — 2500000004 HC RX 250 GENERAL PHARMACY W/ HCPCS (ALT 636 FOR OP/ED): Performed by: STUDENT IN AN ORGANIZED HEALTH CARE EDUCATION/TRAINING PROGRAM

## 2025-01-19 PROCEDURE — 97116 GAIT TRAINING THERAPY: CPT | Mod: GP

## 2025-01-19 PROCEDURE — 99222 1ST HOSP IP/OBS MODERATE 55: CPT | Performed by: STUDENT IN AN ORGANIZED HEALTH CARE EDUCATION/TRAINING PROGRAM

## 2025-01-19 PROCEDURE — 82533 TOTAL CORTISOL: CPT | Mod: TRILAB | Performed by: STUDENT IN AN ORGANIZED HEALTH CARE EDUCATION/TRAINING PROGRAM

## 2025-01-19 PROCEDURE — 1100000001 HC PRIVATE ROOM DAILY

## 2025-01-19 PROCEDURE — G0427 INPT/ED TELECONSULT70: HCPCS | Performed by: STUDENT IN AN ORGANIZED HEALTH CARE EDUCATION/TRAINING PROGRAM

## 2025-01-19 PROCEDURE — 2500000002 HC RX 250 W HCPCS SELF ADMINISTERED DRUGS (ALT 637 FOR MEDICARE OP, ALT 636 FOR OP/ED): Performed by: STUDENT IN AN ORGANIZED HEALTH CARE EDUCATION/TRAINING PROGRAM

## 2025-01-19 RX ORDER — AMLODIPINE BESYLATE 2.5 MG/1
2.5 TABLET ORAL DAILY
Status: DISCONTINUED | OUTPATIENT
Start: 2025-01-19 | End: 2025-01-19

## 2025-01-19 RX ORDER — DOCUSATE SODIUM 100 MG/1
100 CAPSULE, LIQUID FILLED ORAL DAILY
Status: DISCONTINUED | OUTPATIENT
Start: 2025-01-19 | End: 2025-01-22 | Stop reason: HOSPADM

## 2025-01-19 RX ORDER — TAMSULOSIN HYDROCHLORIDE 0.4 MG/1
0.4 CAPSULE ORAL DAILY
Status: DISCONTINUED | OUTPATIENT
Start: 2025-01-19 | End: 2025-01-22 | Stop reason: HOSPADM

## 2025-01-19 RX ORDER — ACETAMINOPHEN 500 MG
5 TABLET ORAL NIGHTLY PRN
Status: DISCONTINUED | OUTPATIENT
Start: 2025-01-19 | End: 2025-01-22 | Stop reason: HOSPADM

## 2025-01-19 RX ORDER — OXYBUTYNIN CHLORIDE 5 MG/1
5 TABLET ORAL 3 TIMES DAILY
Status: DISCONTINUED | OUTPATIENT
Start: 2025-01-19 | End: 2025-01-19

## 2025-01-19 RX ORDER — NAPROXEN SODIUM 220 MG/1
81 TABLET, FILM COATED ORAL DAILY
Status: DISCONTINUED | OUTPATIENT
Start: 2025-01-19 | End: 2025-01-22 | Stop reason: HOSPADM

## 2025-01-19 RX ORDER — LOSARTAN POTASSIUM 100 MG/1
100 TABLET ORAL DAILY
Status: DISCONTINUED | OUTPATIENT
Start: 2025-01-19 | End: 2025-01-22 | Stop reason: HOSPADM

## 2025-01-19 RX ORDER — ACETAMINOPHEN 325 MG/1
650 TABLET ORAL EVERY 6 HOURS PRN
Status: DISCONTINUED | OUTPATIENT
Start: 2025-01-19 | End: 2025-01-22 | Stop reason: HOSPADM

## 2025-01-19 RX ORDER — INSULIN LISPRO 100 [IU]/ML
0-5 INJECTION, SOLUTION INTRAVENOUS; SUBCUTANEOUS
Status: DISCONTINUED | OUTPATIENT
Start: 2025-01-19 | End: 2025-01-22 | Stop reason: HOSPADM

## 2025-01-19 RX ORDER — DEXTROSE 50 % IN WATER (D50W) INTRAVENOUS SYRINGE
25
Status: DISCONTINUED | OUTPATIENT
Start: 2025-01-19 | End: 2025-01-22 | Stop reason: HOSPADM

## 2025-01-19 RX ORDER — DEXTROSE 50 % IN WATER (D50W) INTRAVENOUS SYRINGE
12.5
Status: DISCONTINUED | OUTPATIENT
Start: 2025-01-19 | End: 2025-01-22 | Stop reason: HOSPADM

## 2025-01-19 RX ORDER — FINASTERIDE 5 MG/1
5 TABLET, FILM COATED ORAL DAILY
Status: DISCONTINUED | OUTPATIENT
Start: 2025-01-19 | End: 2025-01-22 | Stop reason: HOSPADM

## 2025-01-19 RX ORDER — CLOPIDOGREL BISULFATE 75 MG/1
75 TABLET ORAL DAILY
Status: DISCONTINUED | OUTPATIENT
Start: 2025-01-19 | End: 2025-01-22 | Stop reason: HOSPADM

## 2025-01-19 RX ORDER — INSULIN LISPRO 100 [IU]/ML
8 INJECTION, SOLUTION INTRAVENOUS; SUBCUTANEOUS
Status: DISCONTINUED | OUTPATIENT
Start: 2025-01-19 | End: 2025-01-22 | Stop reason: HOSPADM

## 2025-01-19 RX ORDER — SODIUM CHLORIDE, SODIUM LACTATE, POTASSIUM CHLORIDE, CALCIUM CHLORIDE 600; 310; 30; 20 MG/100ML; MG/100ML; MG/100ML; MG/100ML
75 INJECTION, SOLUTION INTRAVENOUS CONTINUOUS
Status: ACTIVE | OUTPATIENT
Start: 2025-01-19 | End: 2025-01-20

## 2025-01-19 RX ORDER — INSULIN GLARGINE 100 [IU]/ML
30 INJECTION, SOLUTION SUBCUTANEOUS NIGHTLY
Status: DISCONTINUED | OUTPATIENT
Start: 2025-01-19 | End: 2025-01-22 | Stop reason: HOSPADM

## 2025-01-19 RX ORDER — ATORVASTATIN CALCIUM 40 MG/1
40 TABLET, FILM COATED ORAL NIGHTLY
Status: DISCONTINUED | OUTPATIENT
Start: 2025-01-19 | End: 2025-01-19

## 2025-01-19 RX ORDER — LABETALOL 100 MG/1
100 TABLET, FILM COATED ORAL 2 TIMES DAILY
Status: DISCONTINUED | OUTPATIENT
Start: 2025-01-19 | End: 2025-01-21

## 2025-01-19 RX ORDER — AMOXICILLIN 250 MG
1 CAPSULE ORAL NIGHTLY PRN
Status: DISCONTINUED | OUTPATIENT
Start: 2025-01-19 | End: 2025-01-22 | Stop reason: HOSPADM

## 2025-01-19 RX ADMIN — INSULIN GLARGINE 30 UNITS: 100 INJECTION, SOLUTION SUBCUTANEOUS at 20:13

## 2025-01-19 RX ADMIN — SODIUM CHLORIDE, SODIUM LACTATE, POTASSIUM CHLORIDE, AND CALCIUM CHLORIDE 75 ML/HR: 600; 310; 30; 20 INJECTION, SOLUTION INTRAVENOUS at 06:50

## 2025-01-19 RX ADMIN — TAMSULOSIN HYDROCHLORIDE 0.4 MG: 0.4 CAPSULE ORAL at 09:13

## 2025-01-19 RX ADMIN — INSULIN LISPRO 8 UNITS: 100 INJECTION, SOLUTION INTRAVENOUS; SUBCUTANEOUS at 11:52

## 2025-01-19 RX ADMIN — INSULIN LISPRO 1 UNITS: 100 INJECTION, SOLUTION INTRAVENOUS; SUBCUTANEOUS at 17:54

## 2025-01-19 RX ADMIN — DOCUSATE SODIUM 100 MG: 100 CAPSULE, LIQUID FILLED ORAL at 09:13

## 2025-01-19 RX ADMIN — ASPIRIN 81 MG CHEWABLE TABLET 81 MG: 81 TABLET CHEWABLE at 09:13

## 2025-01-19 RX ADMIN — SODIUM CHLORIDE, SODIUM LACTATE, POTASSIUM CHLORIDE, AND CALCIUM CHLORIDE 75 ML/HR: 600; 310; 30; 20 INJECTION, SOLUTION INTRAVENOUS at 18:40

## 2025-01-19 RX ADMIN — LOSARTAN POTASSIUM 100 MG: 100 TABLET, FILM COATED ORAL at 09:13

## 2025-01-19 RX ADMIN — INSULIN LISPRO 1 UNITS: 100 INJECTION, SOLUTION INTRAVENOUS; SUBCUTANEOUS at 11:53

## 2025-01-19 RX ADMIN — FINASTERIDE 5 MG: 5 TABLET, FILM COATED ORAL at 09:13

## 2025-01-19 RX ADMIN — INSULIN LISPRO 8 UNITS: 100 INJECTION, SOLUTION INTRAVENOUS; SUBCUTANEOUS at 17:55

## 2025-01-19 RX ADMIN — LABETALOL HYDROCHLORIDE 100 MG: 100 TABLET, FILM COATED ORAL at 09:13

## 2025-01-19 RX ADMIN — LABETALOL HYDROCHLORIDE 100 MG: 100 TABLET, FILM COATED ORAL at 20:12

## 2025-01-19 RX ADMIN — CLOPIDOGREL BISULFATE 75 MG: 75 TABLET ORAL at 09:13

## 2025-01-19 SDOH — ECONOMIC STABILITY: FOOD INSECURITY: WITHIN THE PAST 12 MONTHS, YOU WORRIED THAT YOUR FOOD WOULD RUN OUT BEFORE YOU GOT THE MONEY TO BUY MORE.: NEVER TRUE

## 2025-01-19 SDOH — SOCIAL STABILITY: SOCIAL INSECURITY
WITHIN THE LAST YEAR, HAVE YOU BEEN RAPED OR FORCED TO HAVE ANY KIND OF SEXUAL ACTIVITY BY YOUR PARTNER OR EX-PARTNER?: NO

## 2025-01-19 SDOH — ECONOMIC STABILITY: HOUSING INSECURITY: IN THE LAST 12 MONTHS, WAS THERE A TIME WHEN YOU WERE NOT ABLE TO PAY THE MORTGAGE OR RENT ON TIME?: NO

## 2025-01-19 SDOH — SOCIAL STABILITY: SOCIAL INSECURITY: WITHIN THE LAST YEAR, HAVE YOU BEEN HUMILIATED OR EMOTIONALLY ABUSED IN OTHER WAYS BY YOUR PARTNER OR EX-PARTNER?: NO

## 2025-01-19 SDOH — ECONOMIC STABILITY: HOUSING INSECURITY: AT ANY TIME IN THE PAST 12 MONTHS, WERE YOU HOMELESS OR LIVING IN A SHELTER (INCLUDING NOW)?: NO

## 2025-01-19 SDOH — SOCIAL STABILITY: SOCIAL INSECURITY
WITHIN THE LAST YEAR, HAVE YOU BEEN KICKED, HIT, SLAPPED, OR OTHERWISE PHYSICALLY HURT BY YOUR PARTNER OR EX-PARTNER?: NO

## 2025-01-19 SDOH — ECONOMIC STABILITY: HOUSING INSECURITY: IN THE PAST 12 MONTHS, HOW MANY TIMES HAVE YOU MOVED WHERE YOU WERE LIVING?: 0

## 2025-01-19 SDOH — SOCIAL STABILITY: SOCIAL INSECURITY: HAVE YOU HAD THOUGHTS OF HARMING ANYONE ELSE?: NO

## 2025-01-19 SDOH — ECONOMIC STABILITY: INCOME INSECURITY: IN THE PAST 12 MONTHS HAS THE ELECTRIC, GAS, OIL, OR WATER COMPANY THREATENED TO SHUT OFF SERVICES IN YOUR HOME?: YES

## 2025-01-19 SDOH — SOCIAL STABILITY: SOCIAL INSECURITY: DO YOU FEEL ANYONE HAS EXPLOITED OR TAKEN ADVANTAGE OF YOU FINANCIALLY OR OF YOUR PERSONAL PROPERTY?: NO

## 2025-01-19 SDOH — ECONOMIC STABILITY: TRANSPORTATION INSECURITY: IN THE PAST 12 MONTHS, HAS LACK OF TRANSPORTATION KEPT YOU FROM MEDICAL APPOINTMENTS OR FROM GETTING MEDICATIONS?: NO

## 2025-01-19 SDOH — SOCIAL STABILITY: SOCIAL INSECURITY: ARE YOU OR HAVE YOU BEEN THREATENED OR ABUSED PHYSICALLY, EMOTIONALLY, OR SEXUALLY BY ANYONE?: NO

## 2025-01-19 SDOH — SOCIAL STABILITY: SOCIAL INSECURITY: WITHIN THE LAST YEAR, HAVE YOU BEEN AFRAID OF YOUR PARTNER OR EX-PARTNER?: NO

## 2025-01-19 SDOH — ECONOMIC STABILITY: HOUSING INSECURITY: IN THE LAST 12 MONTHS, WAS THERE A TIME WHEN YOU WERE NOT ABLE TO PAY THE MORTGAGE OR RENT ON TIME?: YES

## 2025-01-19 SDOH — SOCIAL STABILITY: SOCIAL INSECURITY: HAS ANYONE EVER THREATENED TO HURT YOUR FAMILY OR YOUR PETS?: NO

## 2025-01-19 SDOH — ECONOMIC STABILITY: FOOD INSECURITY: HOW HARD IS IT FOR YOU TO PAY FOR THE VERY BASICS LIKE FOOD, HOUSING, MEDICAL CARE, AND HEATING?: NOT VERY HARD

## 2025-01-19 SDOH — SOCIAL STABILITY: SOCIAL INSECURITY: ABUSE: ADULT

## 2025-01-19 SDOH — SOCIAL STABILITY: SOCIAL INSECURITY: ARE THERE ANY APPARENT SIGNS OF INJURIES/BEHAVIORS THAT COULD BE RELATED TO ABUSE/NEGLECT?: NO

## 2025-01-19 SDOH — SOCIAL STABILITY: SOCIAL INSECURITY: HAVE YOU HAD ANY THOUGHTS OF HARMING ANYONE ELSE?: NO

## 2025-01-19 SDOH — ECONOMIC STABILITY: FOOD INSECURITY: WITHIN THE PAST 12 MONTHS, THE FOOD YOU BOUGHT JUST DIDN'T LAST AND YOU DIDN'T HAVE MONEY TO GET MORE.: NEVER TRUE

## 2025-01-19 SDOH — SOCIAL STABILITY: SOCIAL INSECURITY: DO YOU FEEL UNSAFE GOING BACK TO THE PLACE WHERE YOU ARE LIVING?: NO

## 2025-01-19 SDOH — SOCIAL STABILITY: SOCIAL INSECURITY: DOES ANYONE TRY TO KEEP YOU FROM HAVING/CONTACTING OTHER FRIENDS OR DOING THINGS OUTSIDE YOUR HOME?: NO

## 2025-01-19 ASSESSMENT — ACTIVITIES OF DAILY LIVING (ADL)
WALKS IN HOME: NEEDS ASSISTANCE
LACK_OF_TRANSPORTATION: NO
LACK_OF_TRANSPORTATION: NO
BATHING_ASSISTANCE: ASSISTIVE DEVICE;MODERATE
LACK_OF_TRANSPORTATION: NO
BATHING: NEEDS ASSISTANCE
GROOMING: NEEDS ASSISTANCE
ADEQUATE_TO_COMPLETE_ADL: YES
ADLS_ADDRESSED: NO
PATIENT'S MEMORY ADEQUATE TO SAFELY COMPLETE DAILY ACTIVITIES?: YES
HEARING - RIGHT EAR: FUNCTIONAL
TOILETING_ASSISTANCE: APPROPRIATE FOR AGE
ASSISTIVE_DEVICE: CANE;EYEGLASSES;WALKER
DRESSING YOURSELF: NEEDS ASSISTANCE
FEEDING YOURSELF: INDEPENDENT
ADL_ASSISTANCE: NEEDS ASSISTANCE
JUDGMENT_ADEQUATE_SAFELY_COMPLETE_DAILY_ACTIVITIES: YES
HEARING - LEFT EAR: FUNCTIONAL
DRESSING_ASSISTANCE: APPROPRIATE FOR AGE
TOILETING: INDEPENDENT

## 2025-01-19 ASSESSMENT — COGNITIVE AND FUNCTIONAL STATUS - GENERAL
TURNING FROM BACK TO SIDE WHILE IN FLAT BAD: TOTAL
DRESSING REGULAR UPPER BODY CLOTHING: A LITTLE
MOBILITY SCORE: 19
MOVING TO AND FROM BED TO CHAIR: TOTAL
TOILETING: A LITTLE
MOVING FROM LYING ON BACK TO SITTING ON SIDE OF FLAT BED WITH BEDRAILS: A LOT
MOBILITY SCORE: 8
CLIMB 3 TO 5 STEPS WITH RAILING: A LOT
DAILY ACTIVITIY SCORE: 20
PATIENT BASELINE BEDBOUND: NO
MOVING TO AND FROM BED TO CHAIR: A LITTLE
CLIMB 3 TO 5 STEPS WITH RAILING: TOTAL
WALKING IN HOSPITAL ROOM: A LITTLE
STANDING UP FROM CHAIR USING ARMS: TOTAL
DRESSING REGULAR LOWER BODY CLOTHING: A LITTLE
WALKING IN HOSPITAL ROOM: A LOT
HELP NEEDED FOR BATHING: A LITTLE
STANDING UP FROM CHAIR USING ARMS: A LITTLE

## 2025-01-19 ASSESSMENT — PAIN - FUNCTIONAL ASSESSMENT
PAIN_FUNCTIONAL_ASSESSMENT: 0-10
PAIN_FUNCTIONAL_ASSESSMENT: 0-10

## 2025-01-19 ASSESSMENT — PATIENT HEALTH QUESTIONNAIRE - PHQ9
1. LITTLE INTEREST OR PLEASURE IN DOING THINGS: NOT AT ALL
2. FEELING DOWN, DEPRESSED OR HOPELESS: NOT AT ALL
SUM OF ALL RESPONSES TO PHQ9 QUESTIONS 1 & 2: 0

## 2025-01-19 ASSESSMENT — LIFESTYLE VARIABLES
HOW MANY STANDARD DRINKS CONTAINING ALCOHOL DO YOU HAVE ON A TYPICAL DAY: PATIENT DOES NOT DRINK
HOW OFTEN DO YOU HAVE 6 OR MORE DRINKS ON ONE OCCASION: NEVER
HOW OFTEN DO YOU HAVE A DRINK CONTAINING ALCOHOL: NEVER
AUDIT-C TOTAL SCORE: 0
AUDIT-C TOTAL SCORE: 0
SKIP TO QUESTIONS 9-10: 1

## 2025-01-19 ASSESSMENT — PAIN SCALES - GENERAL
PAINLEVEL_OUTOF10: 0 - NO PAIN
PAINLEVEL_OUTOF10: 2
PAINLEVEL_OUTOF10: 0 - NO PAIN

## 2025-01-19 ASSESSMENT — PAIN DESCRIPTION - DESCRIPTORS: DESCRIPTORS: SORE

## 2025-01-19 ASSESSMENT — ENCOUNTER SYMPTOMS: WEAKNESS: 1

## 2025-01-19 NOTE — CONSULTS
HPI: Teleneuro consult at [facility]    69 y.o. 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 Also with hx of HTN, DM, JONATHAN, BPH. Presents for weakness, admitted for placement. Teleneuro service consulted for weakness.     Hx is limited from patient, he is slow to respond.  He states he was in his usual state of health until he had a fall yesterday, was too weak to get up, was on the floor ~30min until his sister came over and picked him up, came to ED for evaluation. To me he states he did not have any weakness prior to the fall and now has L hand numbness since the fall. However he reports using walker to walk for 'months' now even prior to the fall. He lives with his sister who helps out with his meds and groceries, cooking; but pt states he is usually still independent with bathing, grooming, using bathroom.     Per chart review pt presented with lightheadedness and weakness over the last several days, difficulty getting up from chairs and moving around.     BP on presentation was elevated to 189/99, glucose 94, reportedly his pupils were pinpoint initially.   Cr 2.11, CK noted to be elevated to 970. Cr 1.95 this am. TSH normal.     Per chart review pt has hx of HMG-CoAR antibody positive necrotizing myopathy. He was in his usual state of health until 11/2020 when he felt his legs have gave out on him and fell on the floor. He was taken to [facility] where he was admitted for about 26 days. At that time, his CK level was over 15,000. Muscle biopsy was taken from deltoid and quads which showed small group atrophy and regeneration consistent with denervation concerning for necrotizing myopathy. Patient was discharged home with PT/OT. He received IVIG about 3 session, and Solu-Medrol. He is found to have HMG Co A reductase antibodies positive, SRP antibodies negative.    Last seen in  neuromuscular clinic in 8/2022 - neuromuscular specialist Dr. Mirian Watkins. CK noted mild elevation to 400s with some weakness but mostly attributed to deconditioning at that time.     Pt currently denies any muscle pain, stiffness, specific weakness of any particular arm and leg. He is able to lift both arms and legs without issues.   He feels his congition is intact and unchanged.      Patient Active Problem List    Diagnosis Date Noted    Generalized weakness 01/19/2025    Cognitive change 01/19/2025    Generalized osteoarthritis 04/05/2024    Recurrent falls 04/05/2024    Pressure injury of deep tissue of right buttock 04/05/2024    Patent foramen ovale ([facility]) 04/05/2024    Palpitations 04/05/2024    Pain of right lower extremity 04/05/2024    Pain of left lower extremity 04/05/2024    Numbness 04/05/2024    Musculoskeletal pain 04/05/2024    Gangrene of foot (Multi) 04/05/2024    Disorder of skeletal muscle 04/05/2024    Critical lower limb ischemia (Multi) 04/05/2024    Absence of nail 04/05/2024    Abnormal gait 04/05/2024    Medical home patient 04/05/2024    Male erectile dysfunction, unspecified 04/05/2024    Long term current use of insulin (Multi) 01/25/2024    Type 1 diabetes mellitus 01/02/2024    Essential hypertension 01/02/2024    Mixed hyperlipidemia 01/02/2024    Coronary artery disease 01/02/2024    History of non-ST elevation myocardial infarction (NSTEMI) 01/02/2024    BPH (benign prostatic hyperplasia) 01/02/2024    Physical debility 01/02/2024    History of ischemic stroke 01/02/2024    Monoclonal gammopathy of undetermined significance 01/02/2024    Nonalcoholic steatohepatitis (BADILLO) 01/02/2024    Obesity 01/02/2024    JONATHAN (obstructive sleep apnea) 01/02/2024    Primary osteoarthritis involving multiple joints 01/02/2024    Venous insufficiency 01/02/2024    Vitamin D deficiency 01/02/2024    Cerebrovascular disease 01/02/2024    Peripheral vascular disease (CMS-HCC) 01/02/2024     Chronic kidney disease 01/02/2024    Hyperkalemia 12/22/2022    Acute osteomyelitis of ankle or foot (Multi) 12/22/2022    Abnormal magnetic resonance imaging of head 12/10/2022    Tremor, unspecified 11/11/2022    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    Spinal stenosis of cervical region 11/04/2022     Current Facility-Administered Medications   Medication Dose Route Frequency Provider Last Rate Last Admin    acetaminophen (Tylenol) tablet 650 mg  650 mg oral q6h PRN Sonia Brown MD        aspirin chewable tablet 81 mg  81 mg oral Daily Sonia Brown MD        clopidogrel (Plavix) tablet 75 mg  75 mg oral Daily Sonia Brown MD        dextrose 50 % injection 12.5 g  12.5 g intravenous q15 min PRN Sonia Brown MD        dextrose 50 % injection 25 g  25 g intravenous q15 min PRN Sonia Brown MD        docusate sodium (Colace) capsule 100 mg  100 mg oral Daily Sonia Brown MD        finasteride (Proscar) tablet 5 mg  5 mg oral Daily Sonia Brown MD        glucagon (Glucagen) injection 1 mg  1 mg intramuscular q15 min PRN Sonia Brown MD        insulin glargine (Lantus) injection 30 Units  30 Units subcutaneous Nightly Sonia Brown MD        insulin lispro injection 0-5 Units  0-5 Units subcutaneous TID AC Sonia Brown MD        insulin lispro injection 8 Units  8 Units subcutaneous TID AC Sonia Brown MD        labetalol (Normodyne) tablet 100 mg  100 mg oral BID Sonia Brown MD        lactated Ringer's infusion  75 mL/hr intravenous Continuous Sonia Brown MD 75 mL/hr at 01/19/25 0650 75 mL/hr at 01/19/25 0650    losartan (Cozaar) tablet 100 mg  100 mg oral Daily Sonia Brown MD        melatonin tablet 5 mg  5 mg oral Nightly PRN Sonia Brown MD        sennosides-docusate sodium (Linda-Colace) 8.6-50 mg per tablet 1 tablet  1 tablet oral Nightly PRN Sonia Brown MD        tamsulosin (Flomax)  24 hr capsule 0.4 mg  0.4 mg oral Daily Sonia Brown MD         Allergies: Ciprofloxacin and Penicillins  Past Medical History:   Diagnosis Date    Chronic kidney disease, unspecified 09/26/2014    Chronic renal insufficiency    Essential (primary) hypertension 09/26/2014    Benign essential hypertension    History of deep venous thrombosis 12/22/2022    History of diabetes mellitus 04/05/2024    History of elevated lipids 04/05/2024    Patent foramen ovale ([facility]-HCC) 09/26/2014    PFO (patent foramen ovale)    Personal history of other endocrine, nutritional and metabolic disease 09/26/2014    History of diabetes mellitus    Personal history of other endocrine, nutritional and metabolic disease 09/26/2014    History of hyperlipidemia    Personal history of other venous thrombosis and embolism 09/26/2014    History of deep venous thrombosis    Personal history of pulmonary embolism 12/22/2022    Personal history of transient ischemic attack (TIA), and cerebral infarction without residual deficits 09/26/2014    History of stroke    Pressure sore on buttocks 11/15/2022     Past Surgical History:   Procedure Laterality Date    US GUIDED PERCUTANEOUS PLACEMENT  7/20/2020    US GUIDED PERCUTANEOUS PLACEMENT [facility] CLINICAL LEGACY     No family history on file.  Social History     Tobacco Use    Smoking status: Never    Smokeless tobacco: Never   Substance Use Topics    Alcohol use: Never       Exam:  Vitals:    01/19/25 0704   BP: 163/76   Pulse: 64   Resp: 15   Temp: 36.5 °C (97.7 °F)   SpO2: 98%     Virtual, limited exam performed  Patient is laying in bed, in NAD  Language without significant dysarthria or aphasia but he speaks incredibly softly and slowly.   Gaze midline, EOM appears full range horizontally and vertically   Face is symmetric on eyeclosure, eyebrow raise and smile  Tongue midline   No drift in both arms and no drift in both legs. Arm and leg strengths are full per bedside NP evaluation.   Sensation  intact to light touch       Imaging Results:  MRI: No MRI head results found for the past 14 days  CT Head: CT head wo IV contrast    Result Date: 1/18/2025  1. No acute intracranial hemorrhage or mass effect. 2. Volume loss and patchy white matter hypoattenuation, likely sequelae of chronic microvascular ischemic change. Bilateral basal ganglia lacunar infarcts and right pontine which appear similar to 11/16/2020.   Signed by: Prabhjot Ballard 1/18/2025 9:59 PM Dictation workstation:   [id number]   CTA: No CT Angio Head Results found for the past 14 days  Echo: No echocardiogram results found for the past 14 days    ASSESSMENT:    69 y.o. 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 Also with hx of HTN, DM, JONATHAN, BPH. Presents for weakness, admitted for placement. Teleneuro service consulted for weakness. Pt had a fall -- may have had worsening weakness for few days, at baseline walks with a walker and has limited mobility. Currently on limited virtual evaluation no drifts x4 but detailed strength exam is limited. CK was noted to be elevated to 970 along with elevated Cr of 2.11, slightly down to 1.95 this am. Repeat CK pending.     Note on med list there was report of pt being on atorvastatin 20mg by 'historical provider'-- this needs to be stopped.     Suspicion for deconditioning remains high but given elevated CK would repeat it, ensure downtrending. If remains elevated would consider trial of prednisone.     Diagnosis:   Possible deconditioning  R/o myositis flare      RECOMMENDATIONS:   - STOP atorvastatin / all statin related products -- this was added to his allergy list; pt did not receive a dose yet as inpatient   - repeat CK added on  - if CK trending up, would consider prednisone for possible flare  - otherwise recommend PT/OT, placement per primary     Will continue to  follow. Will sign out to Dr. Nelson who will be taking over.      Consult completed by: TELEPHONE and VIDEO interactive communication was used to provide this telehealth service. Time includes consultation with ED provider and extensive review of data- history, medical records, lab/radiology/medical test results, neuroimaging studies:  70 minutes was spent in INITIAL telehealth consultation

## 2025-01-19 NOTE — CARE PLAN
The patient's goals for the shift include      The clinical goals for the shift include Safety, monitor labs and vitals      Problem: Fall/Injury  Goal: Not fall by end of shift  Outcome: Progressing  Goal: Be free from injury by end of the shift  Outcome: Progressing  Goal: Verbalize understanding of personal risk factors for fall in the hospital  Outcome: Progressing  Goal: Verbalize understanding of risk factor reduction measures to prevent injury from fall in the home  Outcome: Progressing  Goal: Use assistive devices by end of the shift  Outcome: Progressing  Goal: Pace activities to prevent fatigue by end of the shift  Outcome: Progressing     Problem: Safety - Adult  Goal: Free from fall injury  1/19/2025 1005 by Mine Harris RN  Outcome: Progressing  1/19/2025 1005 by Mine Harris, RN  Outcome: Progressing

## 2025-01-19 NOTE — CARE PLAN
The patient's goals for the shift include      The clinical goals for the shift include admission, patient safety and comfort

## 2025-01-19 NOTE — ASSESSMENT & PLAN NOTE
-No acute findings on CT head, though there is microvascular ischemic damage and old infarcts  -neurology consulted for evaluation

## 2025-01-19 NOTE — ED PROVIDER NOTES
Patient was received in signout at 11:02 PM.     IN BRIEF   69-year-old male presents with generalized weakness.  He has had multiple episodes in the past several days and has been lowering himself to the ground secondary to weakness.  There is been no head injury.  There is been no confusion or other focal deficits.  The time of handoff pending urinalysis and reevaluation to determine disposition.       ED COURSE   Urinalysis returned showing no acute abnormalities.  Had a discussion with the patient his wife.  Medically there is no indication for admission however I do have concerns with patient's ability to care for self and risk of falls at home.  They are agreeable to admission for rehab placement.  Case discussed with hospitalist accepts patient for admission.  Admitted in stable condition.        FINAL IMPRESSION      1. Generalized weakness    2. Frequent falls          DISPOSITION    Admit 01/19/2025 12:58:16 AM     Vanessa Davila, DO  01/19/25 0108

## 2025-01-19 NOTE — PROGRESS NOTES
Admission history and physical and plan was reviewed by admitting physician this morning.  Reviewed neurology's input.  Will continue monitoring CK and consider prednisone if this is a myositis flare.  Otherwise awaiting PT OT.  Further medical management pending hospital course

## 2025-01-19 NOTE — ASSESSMENT & PLAN NOTE
-Reports 45u long acting at night and 16u prandial   -start 30u lantus, 8u prandial, and low dose SSI, adjust as needed

## 2025-01-19 NOTE — PROGRESS NOTES
Physical Therapy    Physical Therapy Evaluation & Treatment    Patient Name: oTny King  MRN: [id number]  Department: [address]  Room: [address] [place], [place] [address]  Today's Date: 1/19/2025   Time Calculation  Start Time: 1039  Stop Time: 1129  Time Calculation (min): 50 min    Assessment/Plan   PT Assessment  PT Assessment Results: Decreased strength, Decreased endurance, Impaired balance, Decreased mobility  Rehab Prognosis: Good  Barriers to Discharge Home: Physical needs  Physical Needs: High falls risk due to function or environment  Evaluation/Treatment Tolerance: Patient tolerated treatment well  Medical Staff Made Aware: Yes  Strengths: Attitude of self  Barriers to Participation: Comorbidities  End of Session Communication: Bedside nurse  Assessment Comment: 70 yo male admitted with generalized weakness. Pt displays LE weakness, impaired balance, impaired gait/transfers/bed mobility, and endurance deficits. Pt would benefit from moderate intensity rehab upon discharge.  End of Session Patient Position: Alarm on, Bed, 3 rail up   IP OR SWING BED PT PLAN  Inpatient or Swing Bed: Inpatient  PT Plan  Treatment/Interventions: Bed mobility, Transfer training, Gait training, Balance training, Neuromuscular re-education, Strengthening, Endurance training, Therapeutic exercise, Range of motion, Therapeutic activity, Home exercise program  PT Plan: Ongoing PT  PT Frequency: 4 times per week  PT Discharge Recommendations: Moderate intensity level of continued care  Equipment Recommended upon Discharge: Wheeled walker, Wheelchair  PT Recommended Transfer Status: Assist x2, Assistive device  PT - OK to Discharge: Yes      Subjective     General Visit Information:  General  Reason for Referral: Impaired mobility  Referred By: Sonia Brown MD  Past Medical History Relevant to Rehab: Chronic kidney disease, unspecified (09/26/2014), Essential (primary) hypertension (09/26/2014), History of deep venous thrombosis (12/22/2022), History of  diabetes mellitus (04/05/2024), History of elevated lipids (04/05/2024), Patent foramen ovale ([facility]) (09/26/2014), Personal history of other endocrine, nutritional and metabolic disease (09/26/2014), Personal history of other endocrine, nutritional and metabolic disease (09/26/2014), Personal history of other venous thrombosis and embolism (09/26/2014), Personal history of pulmonary embolism (12/22/2022), Personal history of transient ischemic attack (TIA), and 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  Family/Caregiver Present: No  Prior to Session Communication: Bedside nurse  Patient Position Received: Bed, 3 rail up, Alarm on  Preferred Learning Style: visual, verbal  General Comment: Pt agreeable to PT evaluation.  Home Living:  Home Living  Type of Home: House  Lives With: Siblings (sister)  Home Adaptive Equipment: Walker rolling or standard, Cane (stair lift, chair lift, hospital bed)  Home Layout: Two level, 1/2 bath on main level, Laundry main level, Stairs to alternate level with rails  Alternate Level Stairs-Rails: Both  Alternate Level Stairs-Number of Steps: 12  Home Access: Stairs to enter with rails  Entrance Stairs-Rails: Right  Entrance Stairs-Number of Steps: 2  Bathroom Shower/Tub: Walk-in shower  Bathroom Toilet: Standard  Bathroom Equipment: Shower chair with back  Prior Level of Function:  Prior Function Per Pt/Caregiver Report  Level of Sch[place]: Needs assistance with homemaking, Needs assistance with ADLs  Receives Help From: Family, Home health, Other (Comment) ([facility] services)  ADL Assistance: Needs assistance  Bath: Assistive device, Moderate  Toileting: Appropriate for age  Dressing: Appropriate for age  Homemaking Assistance: Needs assistance  Meal Prep: Total  Laundry: Total  Vacuuming: Total  Cleaning: Total  Homemaking Assistance Comments: Sister and C nursing helps with homemaking  Ambulatory Assistance:  Independent  Vocational: Retired ( ([employer]))  Precautions:       Vital Signs (Past 2hrs)        Date/Time Vitals Session Patient Position Pulse Resp SpO2 BP MAP (mmHg)    01/19/25 1138 --  --  57  16  100 %  157/72  100                        Objective   Pain:  Pain Assessment  Pain Assessment: 0-10  0-10 (Numeric) Pain Score: 2  Pain Type: Acute pain  Pain Location: Wrist  Pain Orientation: Left  Pain Descriptors: Sore  Pain Interventions: Repositioned  Cognition:  Cognition  Overall Cognitive Status: Within Functional Limits  Insight: Moderate  Processing Speed: Delayed (poor historian)    General Assessments:                  Activity Tolerance  Endurance: Decreased tolerance for upright activites    Sensation  Light Touch: No apparent deficits    Strength  Strength Comments: generalized weakness  Strength  Strength Comments: generalized weakness           Coordination  Movements are Fluid and Coordinated: Yes  Coordination Comment: UE/LE's movements fluid and coordinated, observed via function    Postural Control  Postural Control: Within Functional Limits    Static Sitting Balance  Static Sitting-Balance Support: Bilateral upper extremity supported, Feet supported  Static Sitting-Level of Assistance: Contact guard  Dynamic Sitting Balance  Dynamic Sitting-Balance Support: Feet supported, Bilateral upper extremity supported  Dynamic Sitting-Level of Assistance: Contact guard  Dynamic Sitting-Balance: Forward lean    Static Standing Balance  Static Standing-Balance Support: Bilateral upper extremity supported  Static Standing-Level of Assistance: Contact guard  Dynamic Standing Balance  Dynamic Standing-Balance Support: Bilateral upper extremity supported  Dynamic Standing-Level of Assistance: Contact guard  Dynamic Standing-Balance: Forward lean  Functional Assessments:  ADL  ADL's Addressed: No    Bed Mobility  Bed Mobility: Yes  Bed Mobility 1  Bed Mobility 1: Supine to sitting  Level of Assistance 1:  Maximum assistance, Moderate verbal cues, Minimal tactile cues  Bed Mobility Comments 1: Pt transferred supine to sit with max assist for LE's and trunk. HOB elevated.  Bed Mobility 2  Bed Mobility  2: Sitting to supine  Level of Assistance 2: Moderate assistance, Moderate verbal cues, Minimal tactile cues  Bed Mobility Comments 2: Pt transferred sit to supine with mod assist for LE's. Moderate verbal cues for sequencing during transfer.  Bed Mobility 3  Bed Mobility 3: Scooting  Level of Assistance 3: Maximum assistance    Transfers  Transfer: Yes  Transfer 1  Transfer From 1: Bed to  Transfer to 1: Stand  Technique 1: Sit to stand  Transfer Device 1: Gait belt, Walker  Transfer Level of Assistance 1: Maximum assistance, Moderate verbal cues, Minimal tactile cues  Trials/Comments 1: x2 trials w/ max assist and gait belt donned. Verbal cues for hand and trunk position.  Transfers 2  Transfer From 2: Stand to  Transfer to 2: Sit  Technique 2: Stand to sit  Transfer Device 2: Gait belt, Walker  Transfer Level of Assistance 2: Moderate assistance, Minimal verbal cues  Trials/Comments 2: x2 trials.  Transfers 3  Transfer From 3: Sit to, Chair with arms to  Transfer to 3: Stand  Technique 3: Sit to stand  Transfer Device 3: Walker, Gait belt  Transfer Level of Assistance 3: Maximum assistance, Moderate verbal cues, Minimal tactile cues  Trials/Comments 3: x1 trial with mod verbal cues for hand placement and trunk placement.    Ambulation/Gait Training  Ambulation/Gait Training Performed: Yes  Ambulation/Gait Training 1  Surface 1: Level tile  Device 1: Rolling walker  Gait Support Devices: Gait belt  Assistance 1: Contact guard  Quality of Gait 1: Decreased step length, Forward flexed posture, Diminished heel strike  Comments/Distance (ft) 1: Pt ambulated 5 feet to right, 2 feet fwd/back x 3, 3 feet x 1 using rolling walker and gait belt donned. Pt ambulated with decreased step height/length and forward flexed trunk. Pt  required CGA, no episode of knee buckling or unsteadiness. Verbal cues for step length and height.  Extremity/Trunk Assessments:  RUE   RUE : Within Functional Limits  LUE   LUE: Within Functional Limits  RLE   RLE : Exceptions to WFL  Strength RLE  RLE Overall Strength: Deficits (mild edema)  R Hip Flexion: 4-/5  R Hip Extension: 4-/5  R Hip ABduction: 4-/5  R Hip ADduction: 4-/5  R Knee Flexion: 4-/5  R Knee Extension: 4-/5  R Ankle Dorsiflexion: 4-/5  R Ankle Plantar Flexion: 4-/5  LLE   LLE : Exceptions to WFL  Strength LLE  LLE Overall Strength: Deficits (mild edema)  L Hip Flexion: 4-/5  L Hip Extension: 4-/5  L Hip ABduction: 4-/5  L Hip ADduction: 4-/5  L Knee Flexion: 4-/5  L Knee Extension: 4-/5  L Ankle Dorsiflexion: 4-/5  L Ankle Plantar Flexion: 4-/5  Treatments:          THERAPEUTIC EXERCISE: seated knee ext 2x5 L/R, seated hip flexion x 5 L/R, seated shoulder flexion x10, seated scapular retractionx 10, SLR 2x5 in supine x5, bridge x 3,          Bed Mobility  Bed Mobility: Yes  Bed Mobility 1  Bed Mobility 1: Supine to sitting  Level of Assistance 1: Maximum assistance, Moderate verbal cues, Minimal tactile cues  Bed Mobility Comments 1: Pt transferred supine to sit with max assist for LE's and trunk. HOB elevated.  Bed Mobility 2  Bed Mobility  2: Sitting to supine  Level of Assistance 2: Moderate assistance, Moderate verbal cues, Minimal tactile cues  Bed Mobility Comments 2: Pt transferred sit to supine with mod assist for LE's. Moderate verbal cues for sequencing during transfer.  Bed Mobility 3  Bed Mobility 3: Scooting  Level of Assistance 3: Maximum assistance  Gait training:  Ambulation/Gait Training  Ambulation/Gait Training Performed: Yes  Ambulation/Gait Training 1  Surface 1: Level tile  Device 1: Rolling walker  Gait Support Devices: Gait belt  Assistance 1: Contact guard  Quality of Gait 1: Decreased step length, Forward flexed posture, Diminished heel strike  Comments/Distance (ft) 1:  Pt ambulated 5 feet to right, 2 feet fwd/back x 3, 3 feet x 1 using rolling walker and gait belt donned. Pt ambulated with decreased step height/length and forward flexed trunk. Pt required CGA, no episode of knee buckling or unsteadiness. Verbal cues for step length and height.  Transfers  Transfer: Yes  Transfer 1  Transfer From 1: Bed to  Transfer to 1: Stand  Technique 1: Sit to stand  Transfer Device 1: Gait belt, Walker  Transfer Level of Assistance 1: Maximum assistance, Moderate verbal cues, Minimal tactile cues  Trials/Comments 1: x2 trials w/ max assist and gait belt donned. Verbal cues for hand and trunk position.  Transfers 2  Transfer From 2: Stand to  Transfer to 2: Sit  Technique 2: Stand to sit  Transfer Device 2: Gait belt, Walker  Transfer Level of Assistance 2: Moderate assistance, Minimal verbal cues  Trials/Comments 2: x2 trials.  Transfers 3  Transfer From 3: Sit to, Chair with arms to  Transfer to 3: Stand  Technique 3: Sit to stand  Transfer Device 3: Walker, Gait belt  Transfer Level of Assistance 3: Maximum assistance, Moderate verbal cues, Minimal tactile cues  Trials/Comments 3: x1 trial with mod verbal cues for hand placement and trunk placement.  Outcome Measures:  [facility] Basic Mobility  Turning from your back to your side while in a flat bed without using bedrails: A lot  Moving from lying on your back to sitting on the side of a flat bed without using bedrails: Total  Moving to and from bed to chair (including a wheelchair): Total  Standing up from a chair using your arms (e.g. wheelchair or bedside chair): Total  To walk in hospital room: A lot  Climbing 3-5 steps with railing: Total  Basic Mobility - Total Score: 8    Encounter Problems       Encounter Problems (Active)       PT Problem       PT Goal 1       Start:  01/19/25    Expected End:  02/02/25       Pt will ambulate 15' close supervision with walker to promote safe home mobility .         PT Goal 2       Start:  01/19/25     Expected End:  02/02/25       Pt will transfer from sitting edge of bed to the chair with supervision assist and walker to promote out of bed activity and reduce the risks of prolonged acute care bedrest          PT Goal 3       Start:  01/19/25    Expected End:  02/02/25       Pt will complete bed mobility with moderate assist assist including scooting and rolling L/R hand rail use as needed to promote out of bed activity, comfort, and repositioning           PT Goal 4       Start:  01/19/25    Expected End:  02/02/25       Pt will transfer sit to standing position with moderate assist assist and walker to promote safe out of bed activity             Pain - Adult              Education Documentation  Home Exercise Program, taught by Sebastian Uriostegui, PT at 1/19/2025  1:22 PM.  Learner: Patient  Readiness: Eager  Method: Explanation  Response: Needs Reinforcement, Verbalizes Understanding  Comment: instructed SLR for HEP. needs reinforcement    Education Comments  No comments found.

## 2025-01-19 NOTE — ED PROVIDER NOTES
"The patient was seen in conjunction with the advanced practice provider, and I performed a substantive portion of the encounter. The following is my supervisory note.    History:  Patient presents to the ED by EMS for generalized weakness.  Patient overall limited and poor historian secondary to persistent dizziness with somnolence.  Patient does endorse is some lightheadedness just prior to arrival.  Denies any significant headache or visual changes.  Does not appreciate changes to speech or trouble swallowing.  Notes a history of DM, does not elaborate any abnormal BG readings.  Has not appreciating fever/chills, cardiopulmonary symptoms, changes in bowel/bladder habits.  Denies any changes in dietary intake.  Denies any recent falls or head strikes.  Patient required continuous stimulation to stay awake.    VS:  /78   Pulse 68   Temp 36.3 °C (97.3 °F) (Temporal)   Resp 16   Ht 1.956 m (6' 5\")   Wt 120 kg (264 lb)   SpO2 97%   BMI 31.31 kg/m²      Physical exam:  CONST: alert, normal appearance, no acute distress, does not appear ill/toxic  HEAD: normocephalic, atraumatic  NECK: No JVD  ENT: MMM, no rhinorrhea/congestion, posterior oropharynx clear and oral secretions well controlled  EYES: Pupils pinpoint 1 mm bilaterally, EOMI, no scleral icterus, no nystagmus  CV: RRR, no murmurs, 2+ equal/symmetrical pulses x4  PULM: CTAB, no respiratory distress, not requiring supplemental O2  ABD: soft, flat/non-tender/non-distended, no mass  MSK: No gross evidence of injury/fomites, right hallux amputation, no tenderness palpation x 4  SKIN: warm/dry, no pallor or jaundice, no rash  NEURO: A&Ox4, no facial asymmetry, significant drowsiness with somnolent appearance, no focal neuro deficits, gross strength/motor/sensation intact x4, deferred ambulation given mentation   PSYCH: Mild flat affect      I personally reviewed and interpreted the following studies: EKG is SR 86, LAD/LAFB, first-degree AVB, no " "appreciable ischemia with nonspecific TW findings, labs are significant for baseline RF otherwise unremarkable/noncontributory, images are notable for CXR negative cardiomegaly or pulmonary vascular congestion and no evidence of pulmonary opacities and CTH no evidence of ischemic CVA or spontaneous/nontraumatic ICH, no evidence of mass effect, moderately enlarged ventricles with parenchymal wasting .      MDM:  Patient presented to the ED for generalized weakness with lightheadedness PTA, drowsiness.  Concerning PMHx of HTN, HLD, DM 1, JONATHAN, BPH.    Per Chart Review: Nothing pertinent to this ED encounter and nothing available under Care Everywhere.    Assessment/evaluation concerning for encephalopathy of unclear etiology. No concerning history, clinical evidence/work-up, or exam findings for the concerning differentials of electrolyte/metabolic derangement or encephalopathy, substance-induced encephalopathy/AMS, COVID/influenza viral syndrome/encephalopathy, significant lecture light/metabolic derangement, anemia. These conditions have been thoroughly evaluated and determined to be sufficiently unlikely to be the etiology of patient's presenting symptoms.     Patient signed out to oncoming provider, Dr. Vanessa Davila, at 1:17 AM in stable condition.    /78   Pulse 68   Temp 36.3 °C (97.3 °F) (Temporal)   Resp 16   Ht 1.956 m (6' 5\")   Wt 120 kg (264 lb)   SpO2 97%   BMI 31.31 kg/m²     Remaining workup:  Labs UA and Drug screen and Reassessment    Patient disposition discharge home and alternative disposition medicine admission.      Scores: NIHSS 0    ED Course/Diagnosis:  ED Course as of 01/19/25 0117   Sat Jan 18, 2025 2053 I personally reviewed and interpreted the EKG @ 2002: NSR 86, no appreciable ischemia, LAD, LAFB, 1st deg AVB, non-specific TW findings, and prior EKG on 9/23/2021 reviewed without any appreciable specific/identifiable changes [BC]      ED Course User Index  [BC] Colten MAHONEY " MD Bandar         Diagnoses as of 01/19/25 0117   Generalized weakness   Frequent falls       1. Generalized weakness        2. Frequent falls                 Colten Portillo MD  01/19/25 0036       Colten Portillo MD  01/19/25 0117

## 2025-01-19 NOTE — H&P
History of Present Illness  Tony King is a 69 y.o. male who presented with Weakness, Gen (Patient has has multiple lift assists in the last couple days, EMS arrived to find patient sinking down onto floor from chair. Patient too weak to ambulate or help himself back up., no complaints otherwise) and is admitted for Generalized weakness    Subjective    He reports that yesterday he became acutely weak and had to lower himself to the ground. He required EMS help to get back up. He denies prior falls or prior lift assists at home, however his sister at bedside reports that this is not the first time. He reports no complaints other than the weakness, no sick contacts, no arthralgias, no myalgias, no dizziness, no chest pain, no shortness of breath, no dysuria, no symptoms. His sister reports that over the last few months she has seen a decline in his mental status, she reports that it has gradually gotten worse but seems to worsen stay the same for a bit before worsening again. He has not had a time with getting any better. She is worried that more than weakness may be going on    In the ER, he was given narcan for unclear reason without any change and he had normal UDS. No evidence of UTI, pneumonia, or head injury/acute CT head findings. He is admitted primarily for placement    Review of Systems   Musculoskeletal:  Positive for gait problem.   Neurological:  Positive for weakness.         History    Tony King  has a past medical history of Chronic kidney disease, unspecified (09/26/2014), Essential (primary) hypertension (09/26/2014), History of deep venous thrombosis (12/22/2022), History of diabetes mellitus (04/05/2024), History of elevated lipids (04/05/2024), Patent foramen ovale ([facility]) (09/26/2014), Personal history of other endocrine, nutritional and metabolic disease (09/26/2014), Personal history of other endocrine, nutritional and metabolic disease (09/26/2014), Personal history of other venous  "thrombosis and embolism (09/26/2014), Personal history of pulmonary embolism (12/22/2022), Personal history of transient ischemic attack (TIA), and 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     Past Surgical History:   Procedure Laterality Date    US GUIDED PERCUTANEOUS PLACEMENT  7/20/2020    US GUIDED PERCUTANEOUS PLACEMENT [facility]      Patient  reports that he has never smoked. He has never used smokeless tobacco. He reports that he does not drink alcohol and does not use drugs.     Patient's family history is not on file.          Objective    Blood pressure 166/77, pulse 69, temperature 36.4 °C (97.5 °F), temperature source Temporal, resp. rate 16, height 1.956 m (6' 5.01\"), weight 118 kg (260 lb), SpO2 98%.  Vitals Reviewed: yes  Constitutional: Awake and alert, no cute distress, laying back in ER stretcher  Eyes: EOMI, normal conjunctiva  HENT: moist mucus membranes, airway patent  Pulm: no wheezes, no rhonchi, no crackles, no coarse breath sounds  Cardiac: reg rate, regular rhythm, no murmur, equal pulses  GI: Soft, non tender, non-distended, normal bowel sounds  MSK: trace/1+ bilat lower extremity edema, equal strength all 4 extremities  Skin: small areas of excoriations on bilateral lower extremities  Neuro: No focal deficit, oriented, CN II-XII grossly intact  Psych: Cooperative, flat affect, likely impaired judgement    No intake or output data in the 24 hours ending 01/19/25 0217    Relevant Results  CT head wo IV contrast  Result Date: 1/18/2025  1. No acute intracranial hemorrhage or mass effect. 2. Volume loss and patchy white matter hypoattenuation, likely sequelae of chronic microvascular ischemic change. Bilateral basal ganglia lacunar infarcts and right pontine which appear similar to 11/16/2020.      XR chest 1 view  Result Date: 1/18/2025  Clear lungs. Normal heart size. Borderline inspiration.    Scheduled medications   "   Continuous medications     PRN medications  PRN medications: acetaminophen, melatonin        Assessment    Assessment & Plan  Generalized weakness  -reviewing records from 3 years ago, he also has a history of necrotizing myopathy which required immunotherapy   -adding on CK just in case  -TSH normal, cortisol ordered for morning draw  -PT/OT  Cognitive change  -No acute findings on CT head, though there is microvascular ischemic damage and old infarcts  -neurology consulted for evaluation  Type 1 diabetes mellitus  -Reports 45u long acting at night and 16u prandial   -start 30u lantus, 8u prandial, and low dose SSI, adjust as needed    DVT prophylaxis: SCDs  Disposition: Anticipate placement      I spent 60 minutes in the professional and overall care of this patient.    Sonia Brown

## 2025-01-19 NOTE — ED PROVIDER NOTES
HPI   Chief Complaint   Patient presents with   • Weakness, Gen     Patient has has multiple lift assists in the last couple days, EMS arrived to find patient sinking down onto floor from chair. Patient too weak to ambulate or help himself back up., no complaints otherwise       69-year-old male presented emergency department with a chief complaint of lightheadedness that he states started 20 minutes prior to arrival.  EMS reports that he has been feeling generally weak over the last several days and has had difficulty getting up from chairs and moving around secondary to generalized weakness.  He denies any specific fall or head injury.  He denies anticoagulant.  He denies chest pain shortness of breath abdominal pain dysuria diarrhea.  Lives at home with his sister.  States he is feeling improved at this point in time.  No other complaint.              Patient History   Past Medical History:   Diagnosis Date   • Chronic kidney disease, unspecified 09/26/2014    Chronic renal insufficiency   • Essential (primary) hypertension 09/26/2014    Benign essential hypertension   • History of deep venous thrombosis 12/22/2022   • History of diabetes mellitus 04/05/2024   • History of elevated lipids 04/05/2024   • Patent foramen ovale ([facility]) 09/26/2014    PFO (patent foramen ovale)   • Personal history of other endocrine, nutritional and metabolic disease 09/26/2014    History of diabetes mellitus   • Personal history of other endocrine, nutritional and metabolic disease 09/26/2014    History of hyperlipidemia   • Personal history of other venous thrombosis and embolism 09/26/2014    History of deep venous thrombosis   • Personal history of pulmonary embolism 12/22/2022   • Personal history of transient ischemic attack (TIA), and cerebral infarction without residual deficits 09/26/2014    History of stroke   • Pressure sore on buttocks 11/15/2022     Past Surgical History:   Procedure Laterality Date   • US GUIDED  PERCUTANEOUS PLACEMENT  7/20/2020    US GUIDED PERCUTANEOUS PLACEMENT [facility]     No family history on file.  Social History     Tobacco Use   • Smoking status: Never   • Smokeless tobacco: Never   Substance Use Topics   • Alcohol use: Never   • Drug use: Never       Physical Exam   ED Triage Vitals [01/18/25 2001]   Temperature Heart Rate Respirations BP   36.3 °C (97.3 °F) 89 18 (!) 189/99      Pulse Ox Temp Source Heart Rate Source Patient Position   96 % Temporal Monitor Lying      BP Location FiO2 (%)     Left arm --       Physical Exam  Vitals and nursing note reviewed.   Constitutional:       Appearance: Normal appearance.   HENT:      Head: Normocephalic.      Nose: Nose normal.      Mouth/Throat:      Mouth: Mucous membranes are moist.   Eyes:      Comments: Pinpoint pupils   Cardiovascular:      Rate and Rhythm: Normal rate.      Pulses: Normal pulses.   Pulmonary:      Effort: Pulmonary effort is normal.      Breath sounds: Normal breath sounds.   Abdominal:      General: Abdomen is flat.      Palpations: Abdomen is soft.   Musculoskeletal:         General: Normal range of motion.      Cervical back: Normal range of motion.      Comments: Able to move upper and lower extremity, no midline spinal tenderness   Skin:     General: Skin is warm.   Neurological:      General: No focal deficit present.      Mental Status: He is alert and oriented to person, place, and time.   Psychiatric:         Mood and Affect: Mood normal.           ED Course & MDM   ED Course as of 01/18/25 2326   Sat Jan 18, 2025 2053 I personally reviewed and interpreted the EKG @ 2002: NSR 86, no appreciable ischemia, LAD, LAFB, 1st deg AVB, non-specific TW findings, and prior EKG on 9/23/2021 reviewed without any appreciable specific/identifiable changes [BC]      ED Course User Index  [BC] Colten Portillo MD         Diagnoses as of 01/18/25 2326   Generalized weakness                 No data recorded                                  Medical Decision Making  I have seen and evaluated this patient.  The attending physician has also seen and evaluated this patient.  Vital signs, laboratory testing and diagnostic images if applicable have been reviewed.  All laboratory and imaging is interpreted by myself unless otherwise stated.  Radiology studies are also formally interpreted by radiologist.    CBC without significant leukocytosis or anemia, metabolic panel without significant renal impairment or electrolyte abnormality.  Patient does have baseline renal impairment.  Negative CT brain, negative chest x-ray.  Afebrile nontachycardic.  Awake and talking.        Labs Reviewed   CBC WITH AUTO DIFFERENTIAL - Abnormal       Result Value    WBC 8.8      nRBC 0.0      RBC 5.08      Hemoglobin 14.5      Hematocrit 44.1      MCV 87      MCH 28.5      MCHC 32.9      RDW 13.8      Platelets 179      Neutrophils % 68.0      Immature Granulocytes %, Automated 0.3      Lymphocytes % 20.7      Monocytes % 6.5      Eosinophils % 4.0      Basophils % 0.5      Neutrophils Absolute 5.99 (*)     Immature Granulocytes Absolute, Automated 0.03      Lymphocytes Absolute 1.82      Monocytes Absolute 0.57      Eosinophils Absolute 0.35      Basophils Absolute 0.04     COMPREHENSIVE METABOLIC PANEL - Abnormal    Glucose 94      Sodium 139      Potassium 4.0      Chloride 106      Bicarbonate 25      Anion Gap 12      Urea Nitrogen 26 (*)     Creatinine 2.11 (*)     eGFR 33 (*)     Calcium 8.6      Albumin 3.6      Alkaline Phosphatase 63      Total Protein 8.1      AST 27      Bilirubin, Total 0.4      ALT 18     BLOOD GAS VENOUS FULL PANEL - Abnormal    POCT pH, Venous 7.39      POCT pCO2, Venous 53 (*)     POCT pO2, Venous 26 (*)     POCT SO2, Venous 38 (*)     POCT Oxy Hemoglobin, Venous 37.2 (*)     POCT Hematocrit Calculated, Venous 43.0      POCT Sodium, Venous 138      POCT Potassium, Venous 4.5      POCT Chloride, Venous 107      POCT Ionized [place],  Venous 1.19      POCT Glucose, Venous 98      POCT Lactate, Venous 1.5      POCT Base Excess, Venous 5.6 (*)     POCT HCO3 Calculated, Venous 32.1 (*)     POCT Hemoglobin, Venous 14.3      POCT Anion Gap, Venous 3.0 (*)     Patient Temperature 37.0      FiO2 0     POCT GLUCOSE - Abnormal    POCT Glucose 107 (*)    SARS-COV-2 AND INFLUENZA A/B PCR - Normal    Flu A Result Not Detected      Flu B Result Not Detected      Coronavirus 2019, PCR Not Detected      Narrative:     This assay has received FDA Emergency Use Authorization (EUA) and  is only authorized for the duration of time that circumstances exist to justify the authorization of the emergency use of in vitro diagnostic tests for the detection of SARS-CoV-2 virus and/or diagnosis of COVID-19 infection under section 564(b)(1) of the Act, 21 U.S.C. 360bbb-3(b)(1). Testing for SARS-CoV-2 is only recommended for patients who meet current clinical and/or epidemiological criteria as defined by federal, state, or local public health directives. This assay is an in vitro diagnostic nucleic acid amplification test for the qualitative detection of SARS-CoV-2, Influenza A, and Influenza B from nasopharyngeal specimens and has been validated for use at [facility]. Negative results do not preclude COVID-19 infections or Influenza A/B infections, and should not be used as the sole basis for diagnosis, treatment, or other management decisions. If Influenza A/B and RSV PCR results are negative, testing for Parainfluenza virus, Adenovirus and Metapneumovirus is routinely performed for [facility] pediatric oncology and intensive care inpatients, and is available on other patients by placing an add-on request.    SERIAL TROPONIN-INITIAL - Normal    Troponin I, High Sensitivity 15      Narrative:     Less than 99th percentile of normal range cutoff-  Female and children under 18 years old <14 ng/L; Male <21 ng/L: Negative  Repeat testing should be performed if  clinically indicated.     Female and children under 18 years old 14-50 ng/L; Male 21-50 ng/L:  Consistent with possible cardiac damage and possible increased clinical   risk. Serial measurements may help to assess extent of myocardial damage.     >50 ng/L: Consistent with cardiac damage, increased clinical risk and  myocardial infarction. Serial measurements may help assess extent of   myocardial damage.      NOTE: Children less than 1 year old may have higher baseline troponin   levels and results should be interpreted in conjunction with the overall   clinical context.     NOTE: Troponin I testing is performed using a different   testing methodology at [facility] than at other   [facility]. Direct result comparisons should only   be made within the same method.   SERIAL TROPONIN, 1 HOUR - Normal    Troponin I, High Sensitivity 16      Narrative:     Less than 99th percentile of normal range cutoff-  Female and children under 18 years old <14 ng/L; Male <21 ng/L: Negative  Repeat testing should be performed if clinically indicated.     Female and children under 18 years old 14-50 ng/L; Male 21-50 ng/L:  Consistent with possible cardiac damage and possible increased clinical   risk. Serial measurements may help to assess extent of myocardial damage.     >50 ng/L: Consistent with cardiac damage, increased clinical risk and  myocardial infarction. Serial measurements may help assess extent of   myocardial damage.      NOTE: Children less than 1 year old may have higher baseline troponin   levels and results should be interpreted in conjunction with the overall   clinical context.     NOTE: Troponin I testing is performed using a different   testing methodology at [facility] than at other   [facility]. Direct result comparisons should only   be made within the same method.   TROPONIN SERIES- (INITIAL, 1 HR)    Narrative:     The following orders were created for panel order  Troponin Series, (0, 1 HR).  Procedure                               Abnormality         Status                     ---------                               -----------         ------                     Troponin I, High Sensiti...[[id number]]  Normal              Final result               Troponin, High Sensitivi...[[id number]]  Normal              Final result                 Please view results for these tests on the individual orders.   URINALYSIS WITH REFLEX CULTURE AND MICROSCOPIC    Narrative:     The following orders were created for panel order Urinalysis with Reflex Culture and Microscopic.  Procedure                               Abnormality         Status                     ---------                               -----------         ------                     Urinalysis with Reflex C...[[id number]]                                                 Extra Urine Gray Tube[[id number]]                                                         Please view results for these tests on the individual orders.   URINALYSIS WITH REFLEX CULTURE AND MICROSCOPIC   EXTRA URINE GRAY TUBE   DRUG SCREEN,URINE     CT head wo IV contrast   Final Result   1. No acute intracranial hemorrhage or mass effect.   2. Volume loss and patchy white matter hypoattenuation, likely   sequelae of chronic microvascular ischemic change. Bilateral basal   ganglia lacunar infarcts and right pontine which appear similar to   11/16/2020.        Signed by: Prabhjot Ballard 1/18/2025 9:59 PM   Dictation workstation:   [id number]      XR chest 1 view   Final Result   Clear lungs.   Normal heart size.   Borderline inspiration..   Signed by Rivera Vargas MD        Medications   naloxone (Narcan) injection 1 mg (1 mg intravenous Given 1/18/25 2059)   naloxone (Narcan) injection 2 mg (2 mg intravenous Given 1/18/25 2211)     New Prescriptions    No medications on file         Procedure  Procedures     Horacio Phelps PA-C  01/18/25 2126

## 2025-01-19 NOTE — ASSESSMENT & PLAN NOTE
-reviewing records from 3 years ago, he also has a history of necrotizing myopathy which required immunotherapy   -adding on CK just in case  -TSH normal, cortisol ordered for morning draw  -PT/OT

## 2025-01-20 ENCOUNTER — APPOINTMENT (OUTPATIENT)
Dept: RADIOLOGY | Facility: HOSPITAL | Age: 70
End: 2025-01-20
Payer: MEDICARE

## 2025-01-20 ENCOUNTER — APPOINTMENT (OUTPATIENT)
Dept: CARDIOLOGY | Facility: HOSPITAL | Age: 70
End: 2025-01-20
Payer: MEDICARE

## 2025-01-20 LAB
ANION GAP SERPL CALCULATED.3IONS-SCNC: 8 MMOL/L (ref 10–20)
ATRIAL RATE: 86 BPM
BUN SERPL-MCNC: 19 MG/DL (ref 6–23)
CALCIUM SERPL-MCNC: 7.8 MG/DL (ref 8.6–10.3)
CARDIAC TROPONIN I PNL SERPL HS: 14 NG/L (ref 0–20)
CHLORIDE SERPL-SCNC: 108 MMOL/L (ref 98–107)
CK SERPL-CCNC: 775 U/L (ref 0–325)
CO2 SERPL-SCNC: 24 MMOL/L (ref 21–32)
CREAT SERPL-MCNC: 1.59 MG/DL (ref 0.5–1.3)
EGFRCR SERPLBLD CKD-EPI 2021: 46 ML/MIN/1.73M*2
GLUCOSE BLD MANUAL STRIP-MCNC: 117 MG/DL (ref 74–99)
GLUCOSE BLD MANUAL STRIP-MCNC: 121 MG/DL (ref 74–99)
GLUCOSE BLD MANUAL STRIP-MCNC: 176 MG/DL (ref 74–99)
GLUCOSE BLD MANUAL STRIP-MCNC: 184 MG/DL (ref 74–99)
GLUCOSE BLD MANUAL STRIP-MCNC: 77 MG/DL (ref 74–99)
GLUCOSE SERPL-MCNC: 88 MG/DL (ref 74–99)
P AXIS: 34 DEGREES
P OFFSET: 159 MS
P ONSET: 106 MS
POTASSIUM SERPL-SCNC: 4 MMOL/L (ref 3.5–5.3)
PR INTERVAL: 218 MS
Q ONSET: 215 MS
QRS COUNT: 14 BEATS
QRS DURATION: 102 MS
QT INTERVAL: 368 MS
QTC CALCULATION(BAZETT): 440 MS
QTC FREDERICIA: 415 MS
R AXIS: -37 DEGREES
SODIUM SERPL-SCNC: 136 MMOL/L (ref 136–145)
T AXIS: 38 DEGREES
T OFFSET: 399 MS
VENTRICULAR RATE: 86 BPM

## 2025-01-20 PROCEDURE — 2060000001 HC INTERMEDIATE ICU ROOM DAILY

## 2025-01-20 PROCEDURE — 70496 CT ANGIOGRAPHY HEAD: CPT

## 2025-01-20 PROCEDURE — 2500000002 HC RX 250 W HCPCS SELF ADMINISTERED DRUGS (ALT 637 FOR MEDICARE OP, ALT 636 FOR OP/ED): Performed by: STUDENT IN AN ORGANIZED HEALTH CARE EDUCATION/TRAINING PROGRAM

## 2025-01-20 PROCEDURE — 36415 COLL VENOUS BLD VENIPUNCTURE: CPT | Performed by: NURSE PRACTITIONER

## 2025-01-20 PROCEDURE — 99291 CRITICAL CARE FIRST HOUR: CPT | Performed by: INTERNAL MEDICINE

## 2025-01-20 PROCEDURE — 70498 CT ANGIOGRAPHY NECK: CPT

## 2025-01-20 PROCEDURE — 70498 CT ANGIOGRAPHY NECK: CPT | Performed by: RADIOLOGY

## 2025-01-20 PROCEDURE — 76536 US EXAM OF HEAD AND NECK: CPT

## 2025-01-20 PROCEDURE — 2550000001 HC RX 255 CONTRASTS: Performed by: INTERNAL MEDICINE

## 2025-01-20 PROCEDURE — 93005 ELECTROCARDIOGRAM TRACING: CPT

## 2025-01-20 PROCEDURE — 2500000001 HC RX 250 WO HCPCS SELF ADMINISTERED DRUGS (ALT 637 FOR MEDICARE OP): Performed by: STUDENT IN AN ORGANIZED HEALTH CARE EDUCATION/TRAINING PROGRAM

## 2025-01-20 PROCEDURE — 36415 COLL VENOUS BLD VENIPUNCTURE: CPT | Performed by: STUDENT IN AN ORGANIZED HEALTH CARE EDUCATION/TRAINING PROGRAM

## 2025-01-20 PROCEDURE — 97530 THERAPEUTIC ACTIVITIES: CPT | Mod: GO

## 2025-01-20 PROCEDURE — 93010 ELECTROCARDIOGRAM REPORT: CPT | Performed by: INTERNAL MEDICINE

## 2025-01-20 PROCEDURE — 82550 ASSAY OF CK (CPK): CPT | Performed by: STUDENT IN AN ORGANIZED HEALTH CARE EDUCATION/TRAINING PROGRAM

## 2025-01-20 PROCEDURE — 70496 CT ANGIOGRAPHY HEAD: CPT | Performed by: RADIOLOGY

## 2025-01-20 PROCEDURE — 97165 OT EVAL LOW COMPLEX 30 MIN: CPT | Mod: GO

## 2025-01-20 PROCEDURE — 84484 ASSAY OF TROPONIN QUANT: CPT | Performed by: NURSE PRACTITIONER

## 2025-01-20 PROCEDURE — 82947 ASSAY GLUCOSE BLOOD QUANT: CPT

## 2025-01-20 PROCEDURE — 80048 BASIC METABOLIC PNL TOTAL CA: CPT | Performed by: STUDENT IN AN ORGANIZED HEALTH CARE EDUCATION/TRAINING PROGRAM

## 2025-01-20 PROCEDURE — 76536 US EXAM OF HEAD AND NECK: CPT | Performed by: RADIOLOGY

## 2025-01-20 PROCEDURE — 70450 CT HEAD/BRAIN W/O DYE: CPT | Performed by: RADIOLOGY

## 2025-01-20 PROCEDURE — 99232 SBSQ HOSP IP/OBS MODERATE 35: CPT | Performed by: INTERNAL MEDICINE

## 2025-01-20 PROCEDURE — 70450 CT HEAD/BRAIN W/O DYE: CPT

## 2025-01-20 RX ORDER — SODIUM CHLORIDE 9 MG/ML
75 INJECTION, SOLUTION INTRAVENOUS CONTINUOUS
Status: ACTIVE | OUTPATIENT
Start: 2025-01-20 | End: 2025-01-21

## 2025-01-20 RX ADMIN — LOSARTAN POTASSIUM 100 MG: 100 TABLET, FILM COATED ORAL at 08:35

## 2025-01-20 RX ADMIN — INSULIN LISPRO 1 UNITS: 100 INJECTION, SOLUTION INTRAVENOUS; SUBCUTANEOUS at 13:55

## 2025-01-20 RX ADMIN — LABETALOL HYDROCHLORIDE 100 MG: 100 TABLET, FILM COATED ORAL at 08:35

## 2025-01-20 RX ADMIN — CLOPIDOGREL BISULFATE 75 MG: 75 TABLET ORAL at 08:35

## 2025-01-20 RX ADMIN — IOHEXOL 75 ML: 350 INJECTION, SOLUTION INTRAVENOUS at 16:52

## 2025-01-20 RX ADMIN — ASPIRIN 81 MG CHEWABLE TABLET 81 MG: 81 TABLET CHEWABLE at 08:35

## 2025-01-20 RX ADMIN — INSULIN LISPRO 8 UNITS: 100 INJECTION, SOLUTION INTRAVENOUS; SUBCUTANEOUS at 13:56

## 2025-01-20 RX ADMIN — FINASTERIDE 5 MG: 5 TABLET, FILM COATED ORAL at 08:35

## 2025-01-20 RX ADMIN — INSULIN GLARGINE 30 UNITS: 100 INJECTION, SOLUTION SUBCUTANEOUS at 21:06

## 2025-01-20 RX ADMIN — TAMSULOSIN HYDROCHLORIDE 0.4 MG: 0.4 CAPSULE ORAL at 08:35

## 2025-01-20 SDOH — HEALTH STABILITY: PHYSICAL HEALTH
HOW OFTEN DO YOU NEED TO HAVE SOMEONE HELP YOU WHEN YOU READ INSTRUCTIONS, PAMPHLETS, OR OTHER WRITTEN MATERIAL FROM YOUR DOCTOR OR PHARMACY?: SOMETIMES

## 2025-01-20 SDOH — SOCIAL STABILITY: SOCIAL NETWORK: HOW OFTEN DO YOU ATTEND MEETINGS OF THE CLUBS OR ORGANIZATIONS YOU BELONG TO?: PATIENT DECLINED

## 2025-01-20 SDOH — HEALTH STABILITY: PHYSICAL HEALTH: ON AVERAGE, HOW MANY DAYS PER WEEK DO YOU ENGAGE IN MODERATE TO STRENUOUS EXERCISE (LIKE A BRISK WALK)?: 0 DAYS

## 2025-01-20 SDOH — SOCIAL STABILITY: SOCIAL NETWORK
DO YOU BELONG TO ANY CLUBS OR ORGANIZATIONS SUCH AS CHURCH GROUPS, UNIONS, FRATERNAL OR ATHLETIC GROUPS, OR SCHOOL GROUPS?: PATIENT DECLINED

## 2025-01-20 SDOH — HEALTH STABILITY: MENTAL HEALTH
DO YOU FEEL STRESS - TENSE, RESTLESS, NERVOUS, OR ANXIOUS, OR UNABLE TO SLEEP AT NIGHT BECAUSE YOUR MIND IS TROUBLED ALL THE TIME - THESE DAYS?: NOT AT ALL

## 2025-01-20 SDOH — HEALTH STABILITY: PHYSICAL HEALTH: ON AVERAGE, HOW MANY MINUTES DO YOU ENGAGE IN EXERCISE AT THIS LEVEL?: 0 MIN

## 2025-01-20 SDOH — SOCIAL STABILITY: SOCIAL NETWORK: HOW OFTEN DO YOU GET TOGETHER WITH FRIENDS OR RELATIVES?: MORE THAN THREE TIMES A WEEK

## 2025-01-20 SDOH — SOCIAL STABILITY: SOCIAL INSECURITY: ARE YOU MARRIED, WIDOWED, DIVORCED, SEPARATED, NEVER MARRIED, OR LIVING WITH A PARTNER?: MARRIED

## 2025-01-20 SDOH — SOCIAL STABILITY: SOCIAL NETWORK: HOW OFTEN DO YOU ATTEND CHURCH OR RELIGIOUS SERVICES?: PATIENT DECLINED

## 2025-01-20 SDOH — SOCIAL STABILITY: SOCIAL NETWORK
IN A TYPICAL WEEK, HOW MANY TIMES DO YOU TALK ON THE PHONE WITH FAMILY, FRIENDS, OR NEIGHBORS?: MORE THAN THREE TIMES A WEEK

## 2025-01-20 ASSESSMENT — COGNITIVE AND FUNCTIONAL STATUS - GENERAL
EATING MEALS: A LITTLE
DRESSING REGULAR LOWER BODY CLOTHING: A LITTLE
TOILETING: A LITTLE
PERSONAL GROOMING: A LITTLE
MOVING TO AND FROM BED TO CHAIR: A LITTLE
DRESSING REGULAR LOWER BODY CLOTHING: A LITTLE
DAILY ACTIVITIY SCORE: 15
MOVING FROM LYING ON BACK TO SITTING ON SIDE OF FLAT BED WITH BEDRAILS: A LITTLE
DAILY ACTIVITIY SCORE: 19
STANDING UP FROM CHAIR USING ARMS: A LITTLE
PERSONAL GROOMING: A LITTLE
DRESSING REGULAR UPPER BODY CLOTHING: A LITTLE
WALKING IN HOSPITAL ROOM: A LOT
HELP NEEDED FOR BATHING: A LITTLE
MOVING TO AND FROM BED TO CHAIR: A LITTLE
WALKING IN HOSPITAL ROOM: A LITTLE
TOILETING: A LITTLE
DRESSING REGULAR LOWER BODY CLOTHING: A LOT
HELP NEEDED FOR BATHING: A LITTLE
TURNING FROM BACK TO SIDE WHILE IN FLAT BAD: A LITTLE
HELP NEEDED FOR BATHING: A LOT
MOBILITY SCORE: 16
PERSONAL GROOMING: A LITTLE
TOILETING: A LOT
DRESSING REGULAR UPPER BODY CLOTHING: A LITTLE
DAILY ACTIVITIY SCORE: 19
MOBILITY SCORE: 19
STANDING UP FROM CHAIR USING ARMS: A LITTLE
DRESSING REGULAR UPPER BODY CLOTHING: A LITTLE
CLIMB 3 TO 5 STEPS WITH RAILING: A LOT
CLIMB 3 TO 5 STEPS WITH RAILING: A LOT

## 2025-01-20 ASSESSMENT — ACTIVITIES OF DAILY LIVING (ADL)
TOILETING_ASSISTANCE: APPROPRIATE FOR AGE
ADL_ASSISTANCE: NEEDS ASSISTANCE
BATHING_ASSISTANCE: ASSISTIVE DEVICE;MODERATE
BATHING_ASSISTANCE: MAXIMAL

## 2025-01-20 ASSESSMENT — PAIN SCALES - GENERAL
PAINLEVEL_OUTOF10: 0 - NO PAIN

## 2025-01-20 ASSESSMENT — PAIN - FUNCTIONAL ASSESSMENT: PAIN_FUNCTIONAL_ASSESSMENT: 0-10

## 2025-01-20 NOTE — PROGRESS NOTES
Spiritual Care Visit  Spiritual Care Request    Reason for Visit:  Routine Visit: Introduction     Request Received From:       Focus of Care:  Visited With: Patient         Refer to :          Spiritual Care Assessment    Spiritual Assessment:                      Care Provided:  Intended Effects: Establish rapport and connectedness, Build relationship of care and support, Demonstrate caring and concern  Methods: Offer emotional support  Interventions: Explain  role    Sense of Community and or Restoration Affiliation:  [place]         Addressed Needs/Concerns and/or Radha Through:          Outcome:  Outcome of Spiritual Care Visit: Identifying patient's strengths/source of hope, Support system identified     Advance Directives:         Spiritual Care Annotation      Annotation: provided patient support while rounding the Unit.  introduced  services of  [facility].   explained the role of the  in providing emotional and spiritual support for patient's and family while in admitted to the hospital. Patient was resting and appears comfortable. Patient stated that he had a fall.  No spiritual or Buddhism needs were expressed. Spiritual care will remain available for support as requested.

## 2025-01-20 NOTE — CARE PLAN
Problem: Fall/Injury  Goal: Not fall by end of shift  Outcome: Progressing  Goal: Be free from injury by end of the shift  Outcome: Progressing  Goal: Verbalize understanding of personal risk factors for fall in the hospital  Outcome: Progressing  Goal: Use assistive devices by end of the shift  Outcome: Progressing   The patient's goals for the shift include      The clinical goals for the shift include Safety

## 2025-01-20 NOTE — PROGRESS NOTES
Occupational Therapy    Evaluation/Treatment    Patient Name: Tony King  MRN: [id number]  Department: [address]  Room: [address] [place] [address]  Today's Date: 1/20/2025  Time Calculation  Start Time: 1326  Stop Time: 1357  Time Calculation (min): 31 min    Assessment  IP OT Assessment  OT Assessment: Pt is 68 y/o male admitted for generalized weakness. Pt presents w/ decreased ADL skills/ funcitonal mobility, decreased activity tolerance, decreased balance, decreased strength. Recommend OT services to address above deficits.  Prognosis: Good  Barriers to Discharge Home: Physical needs  Physical Needs: High falls risk due to function or environment  Evaluation/Treatment Tolerance: Patient tolerated treatment well  End of Session Communication: Bedside nurse  End of Session Patient Position: Up in chair, Alarm on  Plan:  Treatment Interventions: ADL retraining, Functional transfer training, Endurance training, Patient/family training, Equipment evaluation/education, Compensatory technique education  OT Frequency: 3 times per week  OT Discharge Recommendations: Moderate intensity level of continued care  Equipment Recommended upon Discharge: Wheeled walker, Wheelchair  OT Recommended Transfer Status: Assist of 2  OT - OK to Discharge: Yes    Subjective   Current Problem:  1. Generalized weakness        2. Frequent falls        3. Bradycardia  Transthoracic Echo (TTE) Complete    Transthoracic Echo (TTE) Complete      4. Abnormal electrocardiogram (ECG) (EKG)  Transthoracic Echo (TTE) Complete    Transthoracic Echo (TTE) Complete        General:  General  Reason for Referral: decrased ADL's d/t generalized weakness  Referred By: Sonia Brown MD  Past Medical History Relevant to Rehab: Chronic kidney disease, unspecified (09/26/2014), Essential (primary) hypertension (09/26/2014), History of deep venous thrombosis (12/22/2022), History of diabetes mellitus (04/05/2024), History of elevated lipids (04/05/2024), Patent [facility]amen ovale  ([facility]) (09/26/2014), Personal history of other endocrine, nutritional and metabolic disease (09/26/2014), Personal history of other endocrine, nutritional and metabolic disease (09/26/2014), Personal history of other venous thrombosis and embolism (09/26/2014), Personal history of pulmonary embolism (12/22/2022), Personal history of transient ischemic attack (TIA), and 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  Family/Caregiver Present: Yes  Caregiver Feedback: 2 sisters present  Prior to Session Communication: Bedside nurse  Patient Position Received: Bed, 3 rail up, Alarm on  Preferred Learning Style: verbal, visual  General Comment: Pt cleaared to be seen for therapy by nursing, pt agreeeable to therapy  Precautions:  Hearing/Visual Limitations: glasses  Medical Precautions: Fall precautions    Vital Sign (Past 2hrs)        Date/Time Vitals Session Patient Position Pulse Resp SpO2 BP MAP (mmHg)    01/20/25 1330 --  --  --  --  --  --  --     01/20/25 1425 --  --  43  --  99 %  162/79  103                        Pain:  Pain Assessment  Pain Assessment: 0-10  0-10 (Numeric) Pain Score: 0 - No pain    Objective   Cognition:  Overall Cognitive Status: Within Functional Limits  Orientation Level: Oriented X4  Insight: Moderate  Processing Speed: Delayed           Home Living:  Type of Home: House  Lives With: Siblings (sister)  Home Adaptive Equipment: Walker rolling or standard, Cane (stair lift, chair lift, hospital bed)  Home Layout: Two level, 1/2 bath on main level, Laundry main level, Stairs to alternate level with rails  Alternate Level Stairs-Rails: Both  Alternate Level Stairs-Number of Steps: 12  Home Access: Stairs to enter with rails  Entrance Stairs-Rails: Right  Entrance Stairs-Number of Steps: 2  Bathroom Shower/Tub: Walk-in shower  Bathroom Toilet: Standard  Bathroom Equipment: Shower chair with back   Prior Function:  Level of  [place]: Needs assistance with homemaking, Needs assistance with ADLs  Receives Help From: Family, Home health, Other (Comment) ([facility] services)  ADL Assistance: Needs assistance  Bath: Assistive device, Moderate  Toileting: Appropriate for age  Dressing: Appropriate for age  Homemaking Assistance: Needs assistance  Meal Prep: Total  Laundry: Total  Vacuuming: Total  Cleaning: Total  Homemaking Assistance Comments: Sister and RADHA helps w/ homemaking  Ambulatory Assistance: Independent  Vocational: Retired ([employer] ([employer]  Hand Dominance: Right  IADL History:  IADL Comments: Sister and HHA assist w/ IADL's  ADL:  Eating Assistance: Stand by  Eating Deficit: Setup (for lunch)  Grooming Assistance: Stand by  Grooming Deficit: Setup, Supervision/safety (per clinical judgement)  Bathing Assistance: Maximal  Bathing Deficit: Setup, Steadying, Increased time to complete , Perineal area, Buttocks, Right lower leg including foot, Left lower leg including foot (per clinical judgement)  UE Dressing Assistance: Minimal  UE Dressing Deficit: Pull around back, Pull down in back, Fasteners (per clinical judgement)  LE Dressing Assistance: Maximal  LE Dressing Deficit: Steadying, Supervision/safety, Increased time to complete, Don/doff R sock, Don/doff L sock, Thread RLE into pants, Thread LLE into pants, Thread RLE into underwear, Thread LLE into underwear, Pull up over hips (per clinical judgement)  Toileting Assistance with Device: Maximal  Toileting Deficit:  (per clinical judgement)  Functional Assistance: Not performed  Activity Tolerance:  Endurance: Decreased tolerance for upright activites  Bed Mobility/Transfers: Bed Mobility  Bed Mobility: Yes  Bed Mobility 1  Bed Mobility 1: Supine to sitting  Level of Assistance 1: Maximum assistance, +2  Bed Mobility Comments 1: Assist for trunk up, legs out, verbal cues for use of bed rail  Bed Mobility 2  Bed Mobility  2: Scooting  Level of Assistance 2: Maximum assistance  Bed  Mobility Comments 2: w/ use of green sheet to sccot hips to EOB.    Transfers  Transfer: Yes  Transfer 1  Transfer From 1: Bed to  Transfer to 1: Stand  Technique 1: Sit to stand  Transfer Device 1: Gait belt, Walker  Transfer Level of Assistance 1: Maximum assistance, +2  Trials/Comments 1: Verbal cues for hand placement, assist fro uprighting, height of bed elev.  Transfers 2  Transfer From 2: Bed to  Transfer to 2: Chair with arms  Technique 2: To right  Transfer Device 2: Walker, Gait belt  Transfer Level of Assistance 2: Moderate assistance, +2  Trials/Comments 2: Verbal cues to back up to chair completely  Transfers 3  Transfer From 3: Stand to  Transfer to 3: Sit, Chair with arms  Technique 3: Stand to sit  Transfer Device 3: Walker, Gait belt  Transfer Level of Assistance 3: Maximum assistance  Trials/Comments 3: Verbal cues to reach back for chair  Modalities:     IADL's:   IADL Comments: Sister and HHA assist w/ IADL's  Vision: Vision - Basic Assessment  Current Vision: Wears glasses all the time  Sensation:  Sensation Comment: Pt reports numbness/tingling in Lt hand since fall  Strength:  Strength Comments: 4/5 BUE's  Perception:     Coordination:  Movements are Fluid and Coordinated: Yes   Hand Function:  Hand Function  Gross Grasp: Functional  Coordination: Functional  Extremities: RUE   RUE : Within Functional Limits and LUE   LUE: Within Functional Limits    Treatment: Educated pt in benefits of AE for ease in LB dressing, initiated functional transfer training for safety in mobility.    Outcome Measures: [facility] Daily Activity  Putting on and taking off regular lower body clothing: A lot  Bathing (including washing, rinsing, drying): A lot  Putting on and taking off regular upper body clothing: A little  Toileting, which includes using toilet, bedpan or urinal: A lot  Taking care of personal grooming such as brushing teeth: A little  Eating Meals: A little  Daily Activity - Total Score:  15      Education Documentation  ADL Training, taught by Marline Chong, OT at 1/20/2025  2:55 PM.  Learner: Patient  Readiness: Acceptance  Method: Explanation  Response: Needs Reinforcement  Comment: Educated pt in benefits of AE for ease in LB dressing, educated in safe transfer tech for safety in mobility.    Education Comments  No comments found.      Goals:   Encounter Problems       Encounter Problems (Active)       OT Goals       ADL's (Progressing)       Start:  01/20/25    Expected End:  02/03/25       Patient will complete ADL tasks, with minimal assist  using AE need in order to increase patient's safety and independence with self-care tasks.           Functional mobility (Progressing)       Start:  01/20/25    Expected End:  02/03/25       Patient will complete functional transfers with minimal assist  using least restrictive device, in order to increase patient's safety and independence with daily tasks.           Activity tolerance (Progressing)       Start:  01/20/25    Expected End:  02/03/25       Patient will demonstrate the ability to participate in functional activity at least >/=  minutes in order to increase patient's safety and independence with daily tasks.

## 2025-01-20 NOTE — SIGNIFICANT EVENT
School rapid response team.  At 2:25 PM rapid response team was called.    According to the nurse, patient was sitting in the chair, interacting with his family and friends when he became unresponsive.  He slumped to the side.  Did not fall on the floor.  Patient was placed back in the bed and rapid response was called.  On arrival, patient is in his bed.  He has spontaneous breathing and his pulse ox 96%.  Patient is diaphoretic.  He is unconscious, initially, he was not responding to sternal rub.  Shortly after, he started responding to painful and then loud verbal stimuli.  Blood sugar was 182  Blood pressure was 156/92 with heart rate 40.  Monitor demonstrated what looks like probable complete heart block versus second-degree Mobitz 2.  I did not see P waves preceding all QRS complexes, P waves were at different distances from QRS complexes.  Despite low heart rate, blood pressure remained stable.  When I was performing neuro exam, patient was able to tell me his name.  When asked about his age he gave me his date of birth.  He did not know what month it was.  Patient was able to follow my finger with his eyes without any restrictions.  Pupils were midline equal.  Motor strength was 5 out of 5 in the left upper extremity and 0 out of 5 in all other extremities.  Patient had very low attention span and I was not able to perform other tests.  Patient was taken for stat CAT scan of the brain which did not demonstrate any acute abnormalities and showed old strokes, stable since 2020.  Patient demonstrated some improvement in his symptoms and he started moving left leg as well with 5-5 out of 5 strength but still very weak on the right side although he was able to squeeze his hand on the right side.  Discussed with patient's sister at bedside: She tells me that patient had few similar episodes preceding this admission.  Last time, on Saturday when he was brought to the hospital he also had problems using his right  leg but then it improved.  Patient's case was discussed with neurologist through telestroke system.  TNK was not recommended due to the fact that patient had neurological deficit 2 days ago and then but improved.  CT angiogram of the neck and had to rule out major vessel occlusion was recommended.  Also, seizure workup was recommended.  Patient was transferred to stepdown unit.  He is on the monitor.  We gave him 0.5 mg of atropine for bradycardia because patient had an episode of nausea and diaphoresis.  Heart rate improved.  Currently remains in normal sinus rhythm.  EKG, reviewed: Sinus bradycardia with inverted T waves in lateral leads.  Will perform echocardiogram, carotid ultrasound, CT angiogram of the head and neck.  Will consult neurology and cardiology.  Will stop labetalol.  Will keep n.p.o. until nursing bedside swallow evaluation is performed and patient will be seen by speech therapy.  Will perform EEG and consult neurologist for possible seizure.  Critical care time spent with patient today 46 minutes.    Addendum:  CTA of the neck/brain results reviewed by a tele-stroke neurologist,  Dr. Brown:  Reviewed CTA head and neck - LMCA stenosis in proximal segment, likely caused symptoms in the setting of bradycardia/ heart block.   Avoid hypotension/ bradycardia.   Start aspirin and plavix for 90 days for symptomatic intracranial stenosis, followed by aspirin monotherapy after.

## 2025-01-20 NOTE — CARE PLAN
Problem: Skin  Goal: Decreased wound size/increased tissue granulation at next dressing change  Outcome: Progressing  Goal: Participates in plan/prevention/treatment measures  Outcome: Progressing  Goal: Prevent/manage excess moisture  Outcome: Progressing  Goal: Prevent/minimize sheer/friction injuries  Outcome: Progressing  Goal: Promote/optimize nutrition  Outcome: Progressing  Flowsheets (Taken 1/20/2025 1846)  Promote/optimize nutrition:   Assist with feeding   Monitor/record intake including meals   Consume > 50% meals/supplements   Offer water/supplements/favorite foods  Goal: Promote skin healing  Outcome: Progressing   The patient's goals for the shift include      The clinical goals for the shift include maintain safety, work with therapy, monitor VS and labs    Over the shift, the patient did not make progress toward the following goals. Barriers to progression include na. Recommendations to address these barriers include na.

## 2025-01-20 NOTE — PROGRESS NOTES
Tony King is a 69 y.o. male on day 1 of admission presenting with Generalized weakness.      Subjective   Pt states he feels better. No overnight events.       Objective     Last Recorded Vitals  /72 (BP Location: Left arm, Patient Position: Lying)   Pulse 61   Temp 36.6 °C (97.9 °F) (Temporal)   Resp 15   Wt 118 kg (260 lb)   SpO2 98%   Intake/Output last 3 Shifts:    Intake/Output Summary (Last 24 hours) at 1/20/2025 1237  Last data filed at 1/20/2025 0551  Gross per 24 hour   Intake 2002.5 ml   Output --   Net 2002.5 ml       Admission Weight  Weight: 120 kg (264 lb) (01/18/25 2001)    Daily Weight  01/19/25 : 118 kg (260 lb)    Image Results  ECG 12 lead  Sinus rhythm with 1st degree AV block  Left axis deviation  Minimal voltage criteria for LVH, may be normal variant ( [place] product )  Septal infarct , age undetermined  Abnormal ECG  When compared with ECG of 23-SEP-2021 12:47,  Previous ECG has undetermined rhythm, needs review  Septal infarct is now Present      Physical Exam  Pt's sister and friends are at bedside.  Pt is NAD.  Cooperative with exam.  In no distress at rest.  A, Ox3.  Face is symmetrical.  Skin - no lesions.  Lungs: clear to auscultations B/L. No wheezes, rales, rhonchi.  Heart: regular S1S2.  Abdomen: soft, NT, ND. BS positive.  Extr.: no edema, cords, cyanosis.  Moves all extr.   Relevant Results               Assessment/Plan                  Assessment & Plan  Generalized weakness  -reviewing records from 3 years ago, he also has a history of necrotizing myopathy which required immunotherapy   -adding on CK just in case  -TSH normal, cortisol ordered for morning draw  -PT/OT  Cognitive change  -No acute findings on CT head, though there is microvascular ischemic damage and old infarcts  -neurology consulted for evaluation  Type 1 diabetes mellitus  -Reports 45u long acting at night and 16u prandial   -start 30u lantus, 8u prandial, and low dose SSI, adjust as  needed      01/20/25:  Rhabdomyolysis due to Statin? CK level is improving.  Cortisol level WNL    Acute-on-chronic renal failure, improving.  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  DM1, cont. Insulin    Deconditioning - PT/OT. Agreeable to rehab.              Kamila Malin MD

## 2025-01-20 NOTE — TELECONSULT
Telephone call from Medicine provider:  69 y o with h/o DM presented to hospital initially on 1/18 with R arm weakness and was recovering from it. This morning, he became acutely unresponsive, bradycardic followed by spontaneous improvement in mentation but was noted to have severe, persistent R sided weakness while recovering.   Advised to obtain CTA/MRA head and neck to evaluate for severe stenosis causing stuttering ischemic symptoms. If negative, consider ictal phenomenon, advise EEG. MRI brain w/o contrast to evaluate for underlying infarct. Not a candidate for TNK as LKN was 2 days ago and suspected recent stroke.    Addendum: Reviewed CTA head and neck - LMCA stenosis in proximal segment and L A2 occlusion, likely caused symptoms in the setting of bradycardia/ heart block.   Avoid hypotension/ bradycardia.   MRI brain w/o contrast  Start aspirin and plavix for 90 days followed by aspirin monotherapy after.

## 2025-01-20 NOTE — CARE PLAN
Problem: Pain - Adult  Goal: Verbalizes/displays adequate comfort level or baseline comfort level  Outcome: Progressing   The patient's goals for the shift include      The clinical goals for the shift include Safety

## 2025-01-20 NOTE — NURSING NOTE
Pt family hit the call light, Pt sitting up in the chair diaphoretic and unresponsive, Rapid response activated, Pt transferred back tobed  PUK=938, Pt /79 heart rate=43, 99% on room air.Providers at bedside.Pt to be transferred to stepdown

## 2025-01-20 NOTE — CONSULTS
Wound Care Consult     Visit Date: 1/20/2025      Patient Name: Tony King         MRN: [id number]           YOB: 1955    Consulted for wound care. A 69 y.o. male patient admitted for   Generalized weakness [R53.1]  Frequent falls [R29.6]   Past Medical History:   Diagnosis Date    Chronic kidney disease, unspecified 09/26/2014    Chronic renal insufficiency    Essential (primary) hypertension 09/26/2014    Benign essential hypertension    History of deep venous thrombosis 12/22/2022    History of diabetes mellitus 04/05/2024    History of elevated lipids 04/05/2024    Patent foramen ovale (HHS-HCC) 09/26/2014    PFO (patent foramen ovale)    Personal history of other endocrine, nutritional and metabolic disease 09/26/2014    History of diabetes mellitus    Personal history of other endocrine, nutritional and metabolic disease 09/26/2014    History of hyperlipidemia    Personal history of other venous thrombosis and embolism 09/26/2014    History of deep venous thrombosis    Personal history of pulmonary embolism 12/22/2022    Personal history of transient ischemic attack (TIA), and cerebral infarction without residual deficits 09/26/2014    History of stroke    Pressure sore on buttocks 11/15/2022      Past Surgical History:   Procedure Laterality Date    US GUIDED PERCUTANEOUS PLACEMENT  7/20/2020    US GUIDED PERCUTANEOUS PLACEMENT [facility]      Scheduled medications  aspirin, 81 mg, oral, Daily  clopidogrel, 75 mg, oral, Daily  docusate sodium, 100 mg, oral, Daily  finasteride, 5 mg, oral, Daily  insulin glargine, 30 Units, subcutaneous, Nightly  insulin lispro, 0-5 Units, subcutaneous, TID AC  insulin lispro, 8 Units, subcutaneous, TID AC  labetalol, 100 mg, oral, BID  losartan, 100 mg, oral, Daily  tamsulosin, 0.4 mg, oral, Daily      Continuous medications     PRN medications  PRN medications: acetaminophen, dextrose, dextrose, glucagon, melatonin, sennosides-docusate sodium      Allergies   Allergen Reactions    Statins-Hmg-Coa Reductase Inhibitors Myalgia     Necrotizing myopathy, HMG-CoAR antibody positive     Ciprofloxacin Unknown    Penicillins Unknown and Other        No results found for the last 90 days.         Pertinent Labs:   [place], Urine   Date Value Ref Range Status   06/21/2023 115 (H) 0 - 23 MG/L Final     Albumin   Date Value Ref Range Status   01/18/2025 3.6 3.4 - 5.0 g/dL Final   10/06/2021 4.2 3.4 - 5.0 g/dL Final     Albumin, SPE   Date Value Ref Range Status   11/23/2020 2.24  Reference range: 3.37 to 4.23  Unit: gm/dL   (L)  Final     Albumin/Protein Total, Ur   Date Value Ref Range Status   10/07/2021 42.4 Not Established % Final         Reason for Consult: consulted by nursing for rash that patient uses cream at home        Wound History: Patient stats there is a cream that is applied and is kept in the refrigerator.     Wound Team Assessment: Patient is supine in bed agreeable to exam, brief in place, purewick removed due to leaking, patient will be ambulating with therapy soon. Patient turned to his right side, underpad, brief removed, cleaned, draw sheet, absorbent pad and Brief placed (in preparation of therapy treatment today). Sacral border pulled back sacrum is clean dry and intact with out redness. Patient unable to give name of cream used on his back and buttocks. No open areas and no rash noted on visit. Bilateral heels are not red and intact. Patient is repositioned to comfort in bed.      Recommendations: no wounds or additional recommendations at this time       Patient is on a [facility] bed frame with [employer] Mattress [place] score is 19, recommend waffle for chair is patient is to sit in chair. Debbi Munoz RN bedside nurse updated, continue pressure injury prevention interventions and nursing to continue to follow provider orders. Re consult wound RN PRN.    Rosie HENRYN RN [facility]  1/20/2025  10:55 AM

## 2025-01-20 NOTE — PROGRESS NOTES
Speech-Language Pathology                 Therapy Communication Note    Patient Name: Tony King  MRN: [id number]  Department: [facility] CT  Room: 422/422-A  Today's Date: 1/20/2025     Discipline: Speech Language Pathology         Missed Time: Attempt    Comment:  Pt off unit for CT at time of attempt .

## 2025-01-20 NOTE — CONSULTS
Inpatient consult to Neurology  Consult performed by: Cherrie Nelson MD  Consult ordered by: MANUEL Ellis-CNP          History Of Present Illness  Tnoy King is a 69 y.o. male presenting with known inflammatory myopathy presents with two episodes of syncope.  One occurred when his aide was assisting him to the bathroom; the second occurred later that day.  Upon the second event he was brought in for further evaluation.  His sister is at the bedside and reports that she now attends his medical appointments due to a decline in his cognition over time.  Furthermore, he has been especially fuzzy-headed for the past week leading up to this visit.  Upon review of th notes since I saw him earlier this evening it is noted that he has had another event, similar to the others. EKG with I-team suggests possible heart block.     Past Medical History  He has a past medical history of Chronic kidney disease, unspecified (09/26/2014), Essential (primary) hypertension (09/26/2014), History of deep venous thrombosis (12/22/2022), History of diabetes mellitus (04/05/2024), History of elevated lipids (04/05/2024), Patent foramen ovale ([facility]) (09/26/2014), Personal history of other endocrine, nutritional and metabolic disease (09/26/2014), Personal history of other endocrine, nutritional and metabolic disease (09/26/2014), Personal history of other venous thrombosis and embolism (09/26/2014), Personal history of pulmonary embolism (12/22/2022), Personal history of transient ischemic attack (TIA), and 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    Surgical History  He has a past surgical history that includes US guided percutaneous placement (7/20/2020).     Social History  He reports that he has never smoked. He has never used smokeless tobacco. He reports that he does not drink alcohol and does not use drugs.     Allergies  Statins-hmg-coa reductase  "inhibitors, Ciprofloxacin, and Penicillins    Medications  Medications Prior to Admission   Medication Sig Dispense Refill Last Dose/Taking    amLODIPine (Norvasc) 2.5 mg tablet Take by mouth.   1/18/2025 Evening    aspirin 81 mg chewable tablet Chew 1 tablet (81 mg) once daily.   1/18/2025 Evening    clopidogrel (Plavix) 75 mg tablet Take 1 tablet (75 mg) by mouth once daily.   1/18/2025 Evening    ergocalciferol (Vitamin D-2) 1.25 MG (83758 UT) capsule Take 1 capsule (1,250 mcg) by mouth 1 (one) time per week.   1/18/2025 Morning    insulin detemir (Levemir FlexTouch U100 Insulin) 100 unit/mL (3 mL) pen Inject 70 Units under the skin once daily in the morning. Take as directed per insulin instructions. 7 each 3 1/18/2025 Noon    insulin lispro (HumaLOG KwikPen Insulin) 100 unit/mL injection Inject 20 Units under the skin once daily with breakfast AND 20 Units once daily at noon. Take with meals AND 20 Units once daily in the evening. Take with meals. Take as directed per insulin instructions. 6 each 3 1/18/2025 Morning    tamsulosin (Flomax) 0.4 mg 24 hr capsule Take 1 capsule (0.4 mg) by mouth once daily.   1/18/2025 Evening    BD Ultra-Fine Orig Pen Needle 29 gauge x 1/2\" needle USE AS DIRECTED... 4 TIMES A DAY FOR 90 DAYS   Unknown    blood sugar diagnostic (Contour Next Test Strips) strip USE AS DIRECTED 4 TIMES A DAY   Unknown    Dexcom G6  misc Dexcom G6  - as directed 14 Jun, 2019 Active   Unknown    Dexcom G6 Sensor device Dexcom G6 Sensor - Check glucose 4x/day for 30 day(s) 14 Jun, 2019 Active   Unknown    Dexcom G6 Transmitter device Dexcom G6 Transmitter - as directed 14 Jun, 2019 Active   Unknown    docusate sodium (Colace) 100 mg capsule Take by mouth.   Unknown    finasteride (Proscar) 5 mg tablet Take 1 tablet (5 mg) by mouth once daily.   Unknown    glucagon 1 mg injection Inject into the muscle.   Unknown    HYDROcodone-acetaminophen (Norco) 5-325 mg tablet Take by mouth.       " "irbesartan (Avapro) 300 mg tablet Take 1 tablet (300 mg) by mouth once daily.   Unknown    ketoconazole (NIZOral) 2 % cream if needed.   Unknown    ketorolac tromethamine (KETOROLAC ORAL) TORADOL 10mg 1 every 6 hours prn Active   Unknown    labetalol (Normodyne) 100 mg tablet Take 1 tablet (100 mg) by mouth 2 times a day.   Unknown    meclizine (Antivert) 25 mg tablet Take 1 tablet (25 mg) by mouth if needed.   Unknown    mirabegron (Myrbetriq) 50 mg tablet extended release 24 hr 24 hr tablet Take by mouth.   Unknown    pantoprazole (ProtoNix) 40 mg EC tablet Take by mouth.   Unknown    polyethylene glycol (Glycolax, Miralax) 1 gram packet Take by mouth.   Unknown    sennosides-docusate sodium (Linda-Colace) 8.6-50 mg tablet Take by mouth.   Unknown    traMADol (Ultram) 50 mg tablet Take 0.5 tablets (25 mg) by mouth if needed.   Unknown     Review of Systems    Scheduled medications  aspirin, 81 mg, oral, Daily  clopidogrel, 75 mg, oral, Daily  docusate sodium, 100 mg, oral, Daily  finasteride, 5 mg, oral, Daily  insulin glargine, 30 Units, subcutaneous, Nightly  insulin lispro, 0-5 Units, subcutaneous, TID AC  insulin lispro, 8 Units, subcutaneous, TID AC  [Held by provider] labetalol, 100 mg, oral, BID  losartan, 100 mg, oral, Daily  tamsulosin, 0.4 mg, oral, Daily      Continuous medications  sodium chloride 0.9%, 75 mL/hr, Last Rate: 75 mL/hr (01/20/25 1825)      PRN medications  PRN medications: acetaminophen, dextrose, dextrose, glucagon, melatonin, sennosides-docusate sodium    Last Recorded Vitals   Blood pressure 145/65, pulse 72, temperature 36.2 °C (97.2 °F), temperature source Temporal, resp. rate 16, height 1.956 m (6' 5.01\"), weight 118 kg (260 lb), SpO2 98%.    Heart is RRR, Lungs CTAB  Neurologically, pt is awake and oriented x4. Attention, concentration, memory, cortical processing are intact. No aphasia  Cranial nerves: VFF, EOMI, No nystagmus, Face is symmetric to sensory and motor, no dysarthria, " Tongue protrudes midline, Shrug symmetric  Motor: 3/5 strength BUE and 2/5 BLE; no tremor or asterixis  Sensation is intact bilaterally throughout  Coordination: no ataxia, no dysmetria/dysdiadochokinesia  DTR symmetric, reduced  Gait unable to assess    Relevant Results    Results for orders placed or performed during the hospital encounter of 01/18/25 (from the past 96 hours)   POCT GLUCOSE   Result Value Ref Range    POCT Glucose 107 (H) 74 - 99 mg/dL   ECG 12 lead   Result Value Ref Range    Ventricular Rate 86 BPM    Atrial Rate 86 BPM    TN Interval 218 ms    QRS Duration 102 ms    QT Interval 368 ms    QTC Calculation(Bazett) 440 ms    P Axis 34 degrees    R Axis -37 degrees    T Axis 38 degrees    QRS Count 14 beats    Q Onset 215 ms    P Onset 106 ms    P Offset 159 ms    T Offset 399 ms    QTC Fredericia 415 ms   CBC and Auto Differential   Result Value Ref Range    WBC 8.8 4.4 - 11.3 x10*3/uL    nRBC 0.0 0.0 - 0.0 /100 WBCs    RBC 5.08 4.50 - 5.90 x10*6/uL    Hemoglobin 14.5 13.5 - 17.5 g/dL    Hematocrit 44.1 41.0 - 52.0 %    MCV 87 80 - 100 fL    MCH 28.5 26.0 - 34.0 pg    MCHC 32.9 32.0 - 36.0 g/dL    RDW 13.8 11.5 - 14.5 %    Platelets 179 150 - 450 x10*3/uL    Neutrophils % 68.0 40.0 - 80.0 %    Immature Granulocytes %, Automated 0.3 0.0 - 0.9 %    Lymphocytes % 20.7 13.0 - 44.0 %    Monocytes % 6.5 2.0 - 10.0 %    Eosinophils % 4.0 0.0 - 6.0 %    Basophils % 0.5 0.0 - 2.0 %    Neutrophils Absolute 5.99 (H) 1.60 - 5.50 x10*3/uL    Immature Granulocytes Absolute, Automated 0.03 0.00 - 0.50 x10*3/uL    Lymphocytes Absolute 1.82 0.80 - 3.00 x10*3/uL    Monocytes Absolute 0.57 0.05 - 0.80 x10*3/uL    Eosinophils Absolute 0.35 0.00 - 0.40 x10*3/uL    Basophils Absolute 0.04 0.00 - 0.10 x10*3/uL   Comprehensive metabolic panel   Result Value Ref Range    Glucose 94 74 - 99 mg/dL    Sodium 139 136 - 145 mmol/L    Potassium 4.0 3.5 - 5.3 mmol/L    Chloride 106 98 - 107 mmol/L    Bicarbonate 25 21 - 32 mmol/L     Anion Gap 12 10 - 20 mmol/L    Urea Nitrogen 26 (H) 6 - 23 mg/dL    Creatinine 2.11 (H) 0.50 - 1.30 mg/dL    eGFR 33 (L) >60 mL/min/1.73m*2    Calcium 8.6 8.6 - 10.3 mg/dL    Albumin 3.6 3.4 - 5.0 g/dL    Alkaline Phosphatase 63 33 - 136 U/L    Total Protein 8.1 6.4 - 8.2 g/dL    AST 27 9 - 39 U/L    Bilirubin, Total 0.4 0.0 - 1.2 mg/dL    ALT 18 10 - 52 U/L   Sars-CoV-2 and Influenza A/B PCR   Result Value Ref Range    Flu A Result Not Detected Not Detected    Flu B Result Not Detected Not Detected    Coronavirus 2019, PCR Not Detected Not Detected   Troponin I, High Sensitivity, Initial   Result Value Ref Range    Troponin I, High Sensitivity 15 0 - 20 ng/L   BLOOD GAS VENOUS FULL PANEL   Result Value Ref Range    POCT pH, Venous 7.39 7.33 - 7.43 pH    POCT pCO2, Venous 53 (H) 41 - 51 mm Hg    POCT pO2, Venous 26 (L) 35 - 45 mm Hg    POCT SO2, Venous 38 (L) 45 - 75 %    POCT Oxy Hemoglobin, Venous 37.2 (L) 45.0 - 75.0 %    POCT Hematocrit Calculated, Venous 43.0 41.0 - 52.0 %    POCT Sodium, Venous 138 136 - 145 mmol/L    POCT Potassium, Venous 4.5 3.5 - 5.3 mmol/L    POCT Chloride, Venous 107 98 - 107 mmol/L    POCT Ionized Calicum, Venous 1.19 1.10 - 1.33 mmol/L    POCT Glucose, Venous 98 74 - 99 mg/dL    POCT Lactate, Venous 1.5 0.4 - 2.0 mmol/L    POCT Base Excess, Venous 5.6 (H) -2.0 - 3.0 mmol/L    POCT HCO3 Calculated, Venous 32.1 (H) 22.0 - 26.0 mmol/L    POCT Hemoglobin, Venous 14.3 13.5 - 17.5 g/dL    POCT Anion Gap, Venous 3.0 (L) 10.0 - 25.0 mmol/L    Patient Temperature 37.0 degrees Celsius    FiO2 0 %   Troponin, High Sensitivity, 1 Hour   Result Value Ref Range    Troponin I, High Sensitivity 16 0 - 20 ng/L   TSH with reflex to Free T4 if abnormal   Result Value Ref Range    Thyroid Stimulating Hormone 2.32 0.44 - 3.98 mIU/L   Creatine Kinase   Result Value Ref Range    Creatine Kinase 970 (H) 0 - 325 U/L   Urinalysis with Reflex Culture and Microscopic   Result Value Ref Range    Color, Urine  Light-Yellow Light-Yellow, Yellow, Dark-Yellow    Appearance, Urine Clear Clear    Specific Gravity, Urine 1.013 1.005 - 1.035    pH, Urine 5.5 5.0, 5.5, 6.0, 6.5, 7.0, 7.5, 8.0    Protein, Urine 20 (TRACE) NEGATIVE, 10 (TRACE), 20 (TRACE) mg/dL    Glucose, Urine Normal Normal mg/dL    Blood, Urine NEGATIVE NEGATIVE    Ketones, Urine NEGATIVE NEGATIVE mg/dL    Bilirubin, Urine NEGATIVE NEGATIVE    Urobilinogen, Urine Normal Normal mg/dL    Nitrite, Urine NEGATIVE NEGATIVE    Leukocyte Esterase, Urine NEGATIVE NEGATIVE   Drug Screen, Urine   Result Value Ref Range    Amphetamine Screen, Urine Presumptive Negative Presumptive Negative    Barbiturate Screen, Urine Presumptive Negative Presumptive Negative    Benzodiazepines Screen, Urine Presumptive Negative Presumptive Negative    Cannabinoid Screen, Urine Presumptive Negative Presumptive Negative    Cocaine Metabolite Screen, Urine Presumptive Negative Presumptive Negative    Fentanyl Screen, Urine Presumptive Negative Presumptive Negative    Opiate Screen, Urine Presumptive Negative Presumptive Negative    Oxycodone Screen, Urine Presumptive Negative Presumptive Negative    PCP Screen, Urine Presumptive Negative Presumptive Negative    Methadone Screen, Urine Presumptive Negative Presumptive Negative   Urinalysis Microscopic   Result Value Ref Range    WBC, Urine 1-5 1-5, NONE /HPF    RBC, Urine NONE NONE, 1-2, 3-5 /HPF   Basic Metabolic Panel   Result Value Ref Range    Glucose 94 74 - 99 mg/dL    Sodium 138 136 - 145 mmol/L    Potassium 4.1 3.5 - 5.3 mmol/L    Chloride 107 98 - 107 mmol/L    Bicarbonate 24 21 - 32 mmol/L    Anion Gap 11 10 - 20 mmol/L    Urea Nitrogen 23 6 - 23 mg/dL    Creatinine 1.95 (H) 0.50 - 1.30 mg/dL    eGFR 36 (L) >60 mL/min/1.73m*2    Calcium 8.3 (L) 8.6 - 10.3 mg/dL   Cortisol AM   Result Value Ref Range    Cortisol  A.M. 8.5 5.0 - 20.0 ug/dL   POCT GLUCOSE   Result Value Ref Range    POCT Glucose 82 74 - 99 mg/dL   POCT GLUCOSE   Result  Value Ref Range    POCT Glucose 154 (H) 74 - 99 mg/dL   POCT GLUCOSE   Result Value Ref Range    POCT Glucose 171 (H) 74 - 99 mg/dL   POCT GLUCOSE   Result Value Ref Range    POCT Glucose 254 (H) 74 - 99 mg/dL   Basic Metabolic Panel   Result Value Ref Range    Glucose 88 74 - 99 mg/dL    Sodium 136 136 - 145 mmol/L    Potassium 4.0 3.5 - 5.3 mmol/L    Chloride 108 (H) 98 - 107 mmol/L    Bicarbonate 24 21 - 32 mmol/L    Anion Gap 8 (L) 10 - 20 mmol/L    Urea Nitrogen 19 6 - 23 mg/dL    Creatinine 1.59 (H) 0.50 - 1.30 mg/dL    eGFR 46 (L) >60 mL/min/1.73m*2    Calcium 7.8 (L) 8.6 - 10.3 mg/dL   Creatine Kinase   Result Value Ref Range    Creatine Kinase 775 (H) 0 - 325 U/L   POCT GLUCOSE   Result Value Ref Range    POCT Glucose 77 74 - 99 mg/dL   POCT GLUCOSE   Result Value Ref Range    POCT Glucose 176 (H) 74 - 99 mg/dL   POCT GLUCOSE   Result Value Ref Range    POCT Glucose 184 (H) 74 - 99 mg/dL   Troponin I, High Sensitivity   Result Value Ref Range    Troponin I, High Sensitivity 14 0 - 20 ng/L   Electrocardiogram, 12-lead PRN ACS symptoms   Result Value Ref Range    Ventricular Rate 40 BPM    Atrial Rate 44 BPM    QRS Duration 114 ms    QT Interval 494 ms    QTC Calculation(Bazett) 402 ms    P Axis 67 degrees    R Axis -40 degrees    T Axis -33 degrees    QRS Count 6 beats    Q Onset 209 ms    T Offset 456 ms    QTC Fredericia 431 ms        CT angio head and neck w and wo IV contrast    Result Date: 1/20/2025  Interpreted By:  Mateusz Wright, STUDY: CT ANGIO HEAD AND NECK W AND WO IV CONTRAST;  1/20/2025 5:12 pm   INDICATION: Signs/Symptoms:Brain attack.     COMPARISON: CT head today.   ACCESSION NUMBER(S): [id number]   ORDERING CLINICIAN: SHAKIR WHALEY   TECHNIQUE: 75 ML of Omnipaque 350 was administered intravenously and axial images of the head and neck were acquired.  Coronal, sagittal, and 3-D reconstructions were provided for review.   FINDINGS:   CTA COW: No major vascular occlusion. Abrupt cut  off flow related enhancement A2 segment left anterior cerebral artery, suspected thrombosis, with faint distal reconstitution. Moderate short-segment stenosis of the A2 segment right anterior cerebral artery. No aneurysms.  No vascular malformations.   CTA CAROTID: No aortic aneurysm or dissection. No significant stenoses at the origins of the great vessels from the aortic arch. No significant stenoses of the brachycephalic artery or bilateral subclavian arteries.   No significant stenoses bilateral carotid arterial systems. Luminal irregularity of the proximal bilateral cervical internal carotid artery suspected be related to fibrofatty plaque rather than connective tissue disorder.   Severe short-segment stenosis V1 segment right vertebral artery. Moderate short-segment stenosis of the V1 segment of the left vertebral artery.   NONVASCULAR NECK: No soft tissue mass. No adenopathy. Salivary glands are unremarkable. Multinodular thyroid. Bones intact.         1. No LVO. Abrupt cut off flow related enhancement A2 segment left anterior cerebral artery, suspected thrombosis, with faint distal reconstitution. 2. Severe short-segment stenosis V1 segment right vertebral artery. Moderate short-segment stenosis of the V1 segment of the left vertebral artery. Suspect atherosclerotic involvement though thrombosis not excluded. 3. Multinodular thyroid. Suggest correlation with thyroid ultrasound to assess for possible neoplasm.   MACRO: None   Signed by: Mateusz Wright 1/20/2025 6:41 PM Dictation workstation:   [id number]    Electrocardiogram, 12-lead PRN ACS symptoms    Result Date: 1/20/2025  Marked sinus bradycardia with AV dissociation and Junctional bradycardia with Sinus/atrial capture with Fusion complexes Left axis deviation T wave abnormality, consider lateral ischemia Abnormal ECG When compared with ECG of 18-JAN-2025 19:58, (unconfirmed) Significant changes have occurred    ECG 12 lead    Result Date:  1/20/2025  Sinus rhythm with 1st degree AV block Left axis deviation Left anterior hemiblock Minimal voltage criteria for LVH, may be normal variant ( Roberto product ) Abnormal ECG When compared with ECG of 23-SEP-2021 12:47, Previous ECG has undetermined rhythm, needs review Confirmed by Jaime Ross ([id number]) on 1/20/2025 3:19:45 PM    CT head wo IV contrast    Result Date: 1/20/2025  Interpreted By:  Maria Guadalupe Barragan, STUDY: CT HEAD WO IV CONTRAST;  1/20/2025 2:48 pm   INDICATION: Signs/Symptoms:altered mental status.  Generalized weakness     COMPARISON: 01/18/2025   ACCESSION NUMBER(S): [id number]   ORDERING CLINICIAN: WERNER JEWELL   TECHNIQUE: Noncontrast axial CT scan of head was performed. Angled reformats in brain and bone windows were generated. The images were reviewed in bone, brain, blood and soft tissue windows.   FINDINGS: CSF Spaces: There is ventricular enlargement with proportionate deepening and widening of the sulci and the sylvian fissures.. There is no extraaxial fluid collection.   Parenchyma: There are areas of diminished density seen within the white matter of each cerebral hemisphere secondary to chronic microvascular ischemic disease. There is an old lacunar infarct within the right basal ganglia extending into the corona radiata with old lacunar infarct of the left corona radiata noted. The grey-white differentiation is intact. There is no mass effect or midline shift. There is no intracranial hemorrhage.   Calvarium: The calvarium is unremarkable.   Paranasal sinuses and mastoids: There is some fluid identified within left sphenoid sinus. The remaining visualized paranasal sinuses are clear.       Atrophy and chronic microvascular ischemic disease with old lacunar infarcts seen bilaterally within the corona radiata.   No acute intracranial process.   No evidence of intracranial hemorrhage or displaced skull fracture.   MACRO: None     Signed by: Maria Guadalupe Barragan 1/20/2025 3:06 PM Dictation  workstation:   [id number]    CT head wo IV contrast    Result Date: 1/18/2025  Interpreted By:  Prabhjot Ballard, STUDY: CT HEAD WO IV CONTRAST;  1/18/2025 8:57 pm   INDICATION: Signs/Symptoms:leg weakness.   COMPARISON: 11/16/2020   ACCESSION NUMBER(S): [id number]   ORDERING CLINICIAN: BOBO DOYLE   TECHNIQUE: Axial noncontrast CT images of the head. Sagittal and coronal reformats were provided.   FINDINGS: BRAIN: No acute intracranial hemorrhage. No mass effect or midline shift. Gray-white matter interfaces are preserved.Patchy white matter hypodensities which are nonspecific but likely related to microangiopathic white matter changes. Remote bilateral basal ganglia and right pontine lacunar infarcts. VENTRICLES and EXTRA-AXIAL SPACES: Prominent ventricles and extra-axial CSF spaces, reflecting parenchymal volume loss.   EXTRACRANIAL SOFT TISSUES:  Scattered soft tissue calcifications and subcutaneous fat stranding which appears similar to the prior exam.   PARANASAL SINUSES/MASTOIDS: The visualized paranasal sinuses and mastoid air cells are aerated.   BONES AND ORBITS: No displaced skull fracture. Status post bilateral lens replacement.   OTHER FINDINGS: None.       1. No acute intracranial hemorrhage or mass effect. 2. Volume loss and patchy white matter hypoattenuation, likely sequelae of chronic microvascular ischemic change. Bilateral basal ganglia lacunar infarcts and right pontine which appear similar to 11/16/2020.   Signed by: Prabhjot Ballard 1/18/2025 9:59 PM Dictation workstation:   [id number]    XR chest 1 view    Result Date: 1/18/2025  STUDY: Chest Radiograph;  01/18/2025 8:57 PM INDICATION: Weakness. COMPARISON: 12/22/2022 XR Chest. 09/23/2021 XR Chest. ACCESSION NUMBER(S): [id number] ORDERING CLINICIAN: BBOO DOYLE TECHNIQUE:  Frontal chest was obtained at 20:57 hours. FINDINGS: CARDIOMEDIASTINAL SILHOUETTE: Cardiomediastinal silhouette is normal in size and configuration.   LUNGS: Lungs are clear.  ABDOMEN: No remarkable upper abdominal findings.  BONES: No acute osseous changes.    Clear lungs. Normal heart size. Borderline inspiration.. Signed by Rivera Vargas MD        Assessment/Plan   Assessment & Plan  Generalized weakness    Type 1 diabetes mellitus    Cognitive change    68 yo M with h/o inflammatory myopathy presents with probable syncopal events.  He has not had a TTE in at least a year, per pt's sister at bedside.  I believe he needs TTE and since the event with I-team at about 5PM today he likely needs eval by electrophysiology.  I will f/up results tomorrow.     I personally spent 60 minutes today, exclusive of procedures, providing care for this patient, including preparation, face to face time, documentation and other services such as review of medical records, diagnostic result, patient education, counseling, coordination of care as specified in the encounter.

## 2025-01-20 NOTE — PROGRESS NOTES
"   01/20/25 1351   Discharge Planning   Living Arrangements Family members   Support Systems Family members   Assistance Needed walker, cane   Type of Residence Private residence   Number of Stairs to Enter Residence 2   Number of Stairs Within Residence 13   Do you have animals or pets at home? Yes   Type of Animals or Pets 2dogs   Who is requesting discharge planning? Patient   Home or Post Acute Services Post acute facilities (Rehab/SNF/etc)   Type of Post Acute Facility Services Skilled nursing   Expected Discharge Disposition SNF   Does the patient need discharge transport arranged? Yes   RoundTrip coordination needed? Yes   Has discharge transport been arranged? No   Patient Choice   Provider Choice list and CMS website (https://medicare.gov/care-compare#search) for post-acute Quality and Resource Measure Data were provided and reviewed with: Patient   Patient / Family choosing to utilize agency / facility established prior to hospitalization No   Stroke Family Assessment   Stroke Family Assessment Needed No     Patient agreeable to SNF.  List given, choices made.   Submitted choices #1 [facility] and #2 [facility] to [facility] for processing.  Patient does NOT need a precert.  3 midnights=Wednesday.    1415-Accepted by GR.  GIANNA wanted copy of card stating patient has   A \"[employer] plan of [place]\"  Asked wife for copy and she will send to me when she gets home.  ADOD:  wednesday  "

## 2025-01-21 ENCOUNTER — APPOINTMENT (OUTPATIENT)
Dept: RADIOLOGY | Facility: HOSPITAL | Age: 70
End: 2025-01-21
Payer: MEDICARE

## 2025-01-21 ENCOUNTER — APPOINTMENT (OUTPATIENT)
Dept: CARDIOLOGY | Facility: HOSPITAL | Age: 70
End: 2025-01-21
Payer: MEDICARE

## 2025-01-21 ENCOUNTER — APPOINTMENT (OUTPATIENT)
Dept: NEUROLOGY | Facility: HOSPITAL | Age: 70
End: 2025-01-21
Payer: MEDICARE

## 2025-01-21 LAB
ANION GAP SERPL CALCULATED.3IONS-SCNC: 9 MMOL/L (ref 10–20)
AORTIC VALVE PEAK VELOCITY: 1.07 M/S
ATRIAL RATE: 44 BPM
AV PEAK GRADIENT: 5 MMHG
AVA (PEAK VEL): 3.53 CM2
BUN SERPL-MCNC: 20 MG/DL (ref 6–23)
CALCIUM SERPL-MCNC: 8.1 MG/DL (ref 8.6–10.3)
CHLORIDE SERPL-SCNC: 107 MMOL/L (ref 98–107)
CK SERPL-CCNC: 735 U/L (ref 0–325)
CO2 SERPL-SCNC: 25 MMOL/L (ref 21–32)
CREAT SERPL-MCNC: 1.85 MG/DL (ref 0.5–1.3)
EGFRCR SERPLBLD CKD-EPI 2021: 38 ML/MIN/1.73M*2
EJECTION FRACTION APICAL 4 CHAMBER: 57.5
EJECTION FRACTION: 58 %
GLUCOSE BLD MANUAL STRIP-MCNC: 192 MG/DL (ref 74–99)
GLUCOSE BLD MANUAL STRIP-MCNC: 213 MG/DL (ref 74–99)
GLUCOSE BLD MANUAL STRIP-MCNC: 216 MG/DL (ref 74–99)
GLUCOSE BLD MANUAL STRIP-MCNC: 85 MG/DL (ref 74–99)
GLUCOSE SERPL-MCNC: 88 MG/DL (ref 74–99)
LEFT ATRIUM VOLUME AREA LENGTH INDEX BSA: 29.8 ML/M2
LEFT VENTRICLE INTERNAL DIMENSION DIASTOLE: 3.49 CM (ref 3.5–6)
LEFT VENTRICULAR OUTFLOW TRACT DIAMETER: 2.3 CM
LV EJECTION FRACTION BIPLANE: 57 %
MITRAL VALVE E/A RATIO: 0.57
P AXIS: 67 DEGREES
POTASSIUM SERPL-SCNC: 4.3 MMOL/L (ref 3.5–5.3)
Q ONSET: 209 MS
QRS COUNT: 6 BEATS
QRS DURATION: 114 MS
QT INTERVAL: 494 MS
QTC CALCULATION(BAZETT): 402 MS
QTC FREDERICIA: 431 MS
R AXIS: -40 DEGREES
RIGHT VENTRICLE FREE WALL PEAK S': 12.7 CM/S
RIGHT VENTRICLE PEAK SYSTOLIC PRESSURE: 19.3 MMHG
SODIUM SERPL-SCNC: 137 MMOL/L (ref 136–145)
T AXIS: -33 DEGREES
T OFFSET: 456 MS
TRICUSPID ANNULAR PLANE SYSTOLIC EXCURSION: 1.8 CM
VENTRICULAR RATE: 40 BPM

## 2025-01-21 PROCEDURE — 2500000002 HC RX 250 W HCPCS SELF ADMINISTERED DRUGS (ALT 637 FOR MEDICARE OP, ALT 636 FOR OP/ED): Performed by: STUDENT IN AN ORGANIZED HEALTH CARE EDUCATION/TRAINING PROGRAM

## 2025-01-21 PROCEDURE — 95816 EEG AWAKE AND DROWSY: CPT

## 2025-01-21 PROCEDURE — 97530 THERAPEUTIC ACTIVITIES: CPT | Mod: GP

## 2025-01-21 PROCEDURE — 70551 MRI BRAIN STEM W/O DYE: CPT

## 2025-01-21 PROCEDURE — 97110 THERAPEUTIC EXERCISES: CPT | Mod: GO

## 2025-01-21 PROCEDURE — 93306 TTE W/DOPPLER COMPLETE: CPT

## 2025-01-21 PROCEDURE — 95816 EEG AWAKE AND DROWSY: CPT | Performed by: PSYCHIATRY & NEUROLOGY

## 2025-01-21 PROCEDURE — 2060000001 HC INTERMEDIATE ICU ROOM DAILY

## 2025-01-21 PROCEDURE — 2500000001 HC RX 250 WO HCPCS SELF ADMINISTERED DRUGS (ALT 637 FOR MEDICARE OP): Performed by: STUDENT IN AN ORGANIZED HEALTH CARE EDUCATION/TRAINING PROGRAM

## 2025-01-21 PROCEDURE — 70551 MRI BRAIN STEM W/O DYE: CPT | Performed by: RADIOLOGY

## 2025-01-21 PROCEDURE — 36415 COLL VENOUS BLD VENIPUNCTURE: CPT | Performed by: STUDENT IN AN ORGANIZED HEALTH CARE EDUCATION/TRAINING PROGRAM

## 2025-01-21 PROCEDURE — 99223 1ST HOSP IP/OBS HIGH 75: CPT | Performed by: INTERNAL MEDICINE

## 2025-01-21 PROCEDURE — 2500000004 HC RX 250 GENERAL PHARMACY W/ HCPCS (ALT 636 FOR OP/ED): Performed by: INTERNAL MEDICINE

## 2025-01-21 PROCEDURE — 82947 ASSAY GLUCOSE BLOOD QUANT: CPT

## 2025-01-21 PROCEDURE — 80048 BASIC METABOLIC PNL TOTAL CA: CPT | Performed by: STUDENT IN AN ORGANIZED HEALTH CARE EDUCATION/TRAINING PROGRAM

## 2025-01-21 PROCEDURE — 82550 ASSAY OF CK (CPK): CPT | Performed by: STUDENT IN AN ORGANIZED HEALTH CARE EDUCATION/TRAINING PROGRAM

## 2025-01-21 PROCEDURE — 99233 SBSQ HOSP IP/OBS HIGH 50: CPT | Performed by: INTERNAL MEDICINE

## 2025-01-21 PROCEDURE — 92610 EVALUATE SWALLOWING FUNCTION: CPT | Mod: GN

## 2025-01-21 PROCEDURE — 93306 TTE W/DOPPLER COMPLETE: CPT | Performed by: INTERNAL MEDICINE

## 2025-01-21 PROCEDURE — 97535 SELF CARE MNGMENT TRAINING: CPT | Mod: GO

## 2025-01-21 RX ADMIN — INSULIN LISPRO 2 UNITS: 100 INJECTION, SOLUTION INTRAVENOUS; SUBCUTANEOUS at 16:21

## 2025-01-21 RX ADMIN — DOCUSATE SODIUM 100 MG: 100 CAPSULE, LIQUID FILLED ORAL at 10:36

## 2025-01-21 RX ADMIN — FINASTERIDE 5 MG: 5 TABLET, FILM COATED ORAL at 10:36

## 2025-01-21 RX ADMIN — ASPIRIN 81 MG CHEWABLE TABLET 81 MG: 81 TABLET CHEWABLE at 10:36

## 2025-01-21 RX ADMIN — INSULIN LISPRO 8 UNITS: 100 INJECTION, SOLUTION INTRAVENOUS; SUBCUTANEOUS at 13:01

## 2025-01-21 RX ADMIN — SODIUM CHLORIDE 75 ML/HR: 900 INJECTION, SOLUTION INTRAVENOUS at 03:17

## 2025-01-21 RX ADMIN — LOSARTAN POTASSIUM 100 MG: 100 TABLET, FILM COATED ORAL at 10:36

## 2025-01-21 RX ADMIN — CLOPIDOGREL BISULFATE 75 MG: 75 TABLET ORAL at 10:36

## 2025-01-21 RX ADMIN — TAMSULOSIN HYDROCHLORIDE 0.4 MG: 0.4 CAPSULE ORAL at 10:36

## 2025-01-21 RX ADMIN — INSULIN GLARGINE 30 UNITS: 100 INJECTION, SOLUTION SUBCUTANEOUS at 20:44

## 2025-01-21 RX ADMIN — INSULIN LISPRO 8 UNITS: 100 INJECTION, SOLUTION INTRAVENOUS; SUBCUTANEOUS at 16:21

## 2025-01-21 RX ADMIN — INSULIN LISPRO 1 UNITS: 100 INJECTION, SOLUTION INTRAVENOUS; SUBCUTANEOUS at 13:00

## 2025-01-21 ASSESSMENT — PAIN SCALES - GENERAL
PAINLEVEL_OUTOF10: 0 - NO PAIN

## 2025-01-21 ASSESSMENT — COGNITIVE AND FUNCTIONAL STATUS - GENERAL
DRESSING REGULAR LOWER BODY CLOTHING: TOTAL
TOILETING: A LITTLE
HELP NEEDED FOR BATHING: A LOT
MOVING TO AND FROM BED TO CHAIR: A LOT
WALKING IN HOSPITAL ROOM: A LOT
MOVING FROM LYING ON BACK TO SITTING ON SIDE OF FLAT BED WITH BEDRAILS: A LOT
PERSONAL GROOMING: A LITTLE
STANDING UP FROM CHAIR USING ARMS: A LITTLE
STANDING UP FROM CHAIR USING ARMS: A LOT
EATING MEALS: A LITTLE
DRESSING REGULAR UPPER BODY CLOTHING: A LITTLE
CLIMB 3 TO 5 STEPS WITH RAILING: A LOT
TOILETING: TOTAL
TURNING FROM BACK TO SIDE WHILE IN FLAT BAD: TOTAL
DRESSING REGULAR UPPER BODY CLOTHING: A LOT
DAILY ACTIVITIY SCORE: 19
WALKING IN HOSPITAL ROOM: TOTAL
MOBILITY SCORE: 9
DAILY ACTIVITIY SCORE: 12
HELP NEEDED FOR BATHING: A LITTLE
TURNING FROM BACK TO SIDE WHILE IN FLAT BAD: A LITTLE
MOBILITY SCORE: 16
PERSONAL GROOMING: A LITTLE
DRESSING REGULAR LOWER BODY CLOTHING: A LITTLE
MOVING FROM LYING ON BACK TO SITTING ON SIDE OF FLAT BED WITH BEDRAILS: A LITTLE
MOVING TO AND FROM BED TO CHAIR: A LITTLE
CLIMB 3 TO 5 STEPS WITH RAILING: TOTAL

## 2025-01-21 ASSESSMENT — PAIN - FUNCTIONAL ASSESSMENT
PAIN_FUNCTIONAL_ASSESSMENT: 0-10

## 2025-01-21 ASSESSMENT — ACTIVITIES OF DAILY LIVING (ADL): HOME_MANAGEMENT_TIME_ENTRY: 10

## 2025-01-21 NOTE — PROGRESS NOTES
Speech-Language Pathology    Inpatient Speech-Language Pathology Clinical Swallow Evaluation       Patient Name: Tony King  MRN: [id number]  : 1955  Today's Date: 25  Time Calculation  Start Time: 1026  Stop Time: 1046  Time Calculation (min): 20 min       Current Problem:  1. Generalized weakness        2. Frequent falls        3. Bradycardia  Transthoracic Echo (TTE) Complete    Transthoracic Echo (TTE) Complete      4. Abnormal electrocardiogram (ECG) (EKG)  Transthoracic Echo (TTE) Complete    Transthoracic Echo (TTE) Complete          Past Medical History:  Past Medical History:   Diagnosis Date    Chronic kidney disease, unspecified 2014    Chronic renal insufficiency    Essential (primary) hypertension 2014    Benign essential hypertension    History of deep venous thrombosis 2022    History of diabetes mellitus 2024    History of elevated lipids 2024    Patent foramen ovale (HHS-HCC) 2014    PFO (patent foramen ovale)    Personal history of other endocrine, nutritional and metabolic disease 2014    History of diabetes mellitus    Personal history of other endocrine, nutritional and metabolic disease 2014    History of hyperlipidemia    Personal history of other venous thrombosis and embolism 2014    History of deep venous thrombosis    Personal history of pulmonary embolism 2022    Personal history of transient ischemic attack (TIA), and cerebral infarction without residual deficits 2014    History of stroke    Pressure sore on buttocks 11/15/2022     Reason for Referral:  Pt presented to ED on 25 with generalized weakness and stroke-like symptoms.  Pt admitted for diagnostic testing to determine etiology.  Pt referred to Speech-Language Pathology services for Clinical Swallow Evaluation in order to assess current swallow skills and determine plan of treatment.      Relevant Imaging Results:  CXR 25  IMPRESSION:  Clear  lungs.  Normal heart size.  Borderline inspiration.     CT head 1/20/25  IMPRESSION:  Atrophy and chronic microvascular ischemic disease with old lacunar  infarcts seen bilaterally within the corona radiata.      No acute intracranial process.      No evidence of intracranial hemorrhage or displaced skull fracture.    Assessment    Impression: Pt demonstrates adequate oral and pharyngeal phases of swallow; swallow abilities WFL.  Recommend continue currently ordered diet, as noted below.  Further intervention for swallowing not indicated at this time.    Consistencies Trialed: Regular (IDDSI Level 7), Thin (IDDSI Level 0) - Cup   Oral Motor: Within Functional Limits  Dentition: Adequate/Natural   Medical Staff Made Aware: Yes      Recommendations:  Risk for Aspiration: No   Solid Diet Recommendations : Regular (IDDSI Level 7)   Liquid Diet Recommendations: Thin (IDDSI Level 0)     MBSS Recommended: No, as no pharyngeal dysphagia is suspected per Clinical Swallow Evaluation.    Medication Intake Method: Whole with liquid  Compensatory Swallow Strategies:   - Upright positioning as close to 90º as possible  - Small bites/sips    Skilled dysphagia treatment is not warranted at next level of care as pt has achieved functional baseline and further intervention is not indicated.    Plan  SLP Plan: No skilled SLP   Further treatment in acute setting:  Skilled dysphagia treatment is not warranted as patient is at functional baseline.    Subjective   Pt pleasant and cooperative, upright in bed for assessment, eating breakfast.      General Visit Information:  Prior to Session Communication: Bedside nurse   Self feeding: Independent    Objective  Clinical Swallow Evaluation completed consisting of patient/caregiver interview, oral motor assessment, and analysis of swallow abilities with PO trials of regular and soft solids from breakfast tray, as well as thin liquids.    ORAL PHASE:   Natural dentition in good condition.      Oral mucosa were normal in color, moist, and free of obvious lesions.    Mastication of regular and soft solids was adequate.  Bolus formation,  A-P transport, and oral clearance were also adequate.    PHARYNGEAL PHASE:   No suspected delay in onset of timeliness of swallow.  Laryngeal movement was visualized with all trials, however adequacy of hyolaryngeal elevation/excursion cannot be determined at bedside.   No overt (immediate or delayed) s/s aspiration were demonstrated with any consistencies.  Vocal quality clear post all swallows.    Baseline Assessment:  Behavior/Cognition: Alert, Cooperative  Patient Positioning: Upright in Bed     Pain:  Pain Assessment  Pain Assessment: 0-10  0-10 (Numeric) Pain Score: 0 - No pain     Oxygen Status:  Room air    Oral Motor Assessment:  Oral/Motor Assessment  Oral Hygiene: WFL  Dentition: Adequate/Natural  Oral Motor: Within Functional Limits  Labial ROM: Within Functional Limits  Lingual ROM: Within Functional Limits    Inpatient Education:   Individual(s) Educated: Patient  Verbal Education : Results, recommendations  Risk and Benefits Discussed with Patient/Caregiver/Other: yes  Patient/Caregiver Demonstrated Understanding: yes  Plan of Care Discussed and Agreed Upon: yes  Patient Response to Education: Patient/Caregiver Verbalized Understanding of Information    Communication with Medical Team:  SLP communicated with bedside nurse prior to evaluation to obtain clearance for patient participation.  Results of the clinical swallow evaluation were discussed verbally with nurse upon completion with verbal understanding noted.

## 2025-01-21 NOTE — CARE PLAN
Problem: Safety - Adult  Goal: Free from fall injury  Outcome: Progressing     Problem: Discharge Planning  Goal: Discharge to home or other facility with appropriate resources  Outcome: Progressing     Problem: Fall/Injury  Goal: Not fall by end of shift  Outcome: Progressing     The patient's goals for the shift include     The clinical goals for the shift include monitor pt vitals

## 2025-01-21 NOTE — PROGRESS NOTES
Physical Therapy    Physical Therapy Treatment    Patient Name: Tony King  MRN: [id number]  Department: [address]  Room: Pending sale to [facility]  Today's Date: 1/21/2025  Time Calculation  Start Time: 1258  Stop Time: 1321  Time Calculation (min): 23 min         Assessment/Plan   PT Assessment  PT Assessment Results: Decreased strength, Decreased endurance, Impaired balance, Decreased mobility, Impaired judgement, Decreased safety awareness, Decreased cognition, Decreased coordination  Rehab Prognosis: Good  Barriers to Discharge Home: Physical needs, Cognition needs  Cognition Needs: 24hr supervision for safety awareness needed  Physical Needs: 24hr mobility assistance needed  Evaluation/Treatment Tolerance: Patient limited by fatigue  Medical Staff Made Aware: Yes  End of Session Communication: Bedside nurse  End of Session Patient Position: Bed, 3 rail up, Alarm on    Assessment Comment: Pt had transferred to step-down for bradycardia episode on 1/20/25. MRI on 1/21/25, identified acute or subacute infarcts in L PAULIE terriotry. Pt remains an increased falls risk and requires assist x 2 for safe mobility.       PT Plan  Treatment/Interventions: Bed mobility, Transfer training, Balance training, Neuromuscular re-education, Strengthening, Endurance training, Therapeutic exercise, Therapeutic activity, Positioning, Postural re-education  PT Plan: Ongoing PT  PT Frequency: 4 times per week  PT Discharge Recommendations: High intensity level of continued care  Equipment Recommended upon Discharge:  (TBD)  PT Recommended Transfer Status: Total assist  PT - OK to Discharge: Yes      General Visit Information:   PT  Visit  PT Received On: 01/21/25  General  Reason for Referral: impaired mobility; generalized weakness  Family/Caregiver Present: No  Prior to Session Communication: Bedside nurse  Patient Position Received: Bed, 3 rail up, Alarm on  Preferred Learning Style: visual, verbal  General Comment: Pt transferred to step-down on  "1/20/25 post rapid response for episode of bradycardia and possible A-V dissociation. Cardiology and neurology consults. per cardiology, awaiting EP input, maintain medical management. MRI on 1/21/25, \"acute or subacute infarcts [place] territory.\"    Subjective   Precautions:  Precautions  Hearing/Visual Limitations: glasses  Medical Precautions: Fall precautions    Vital Signs (Past 2hrs)           Vital Signs Comment: HR 73 - 92 during session     Objective   Pain:  Pain Assessment  Pain Assessment: 0-10  0-10 (Numeric) Pain Score: 0 - No pain  Cognition:  Cognition  Orientation Level:  (oriented to self, place and month/year)  Cognition Comments: Delayed processing, required increased time to complete tasks. Able to follow simple, motor commands. increased difficulty with complex commands.  Processing Speed: Delayed  Coordination:  Coordination Comment: Slow movements, primarily in RLE.  Postural Control:  Postural Control  Posture Comment: flexed posture, rounded shoulders    Activity Tolerance:  Activity Tolerance  Endurance: Decreased tolerance for upright activites  Treatments:  Therapeutic Exercise  Therapeutic Exercise Activity 1: x10 B LAQs sitting on EOB    Therapeutic Activity  Therapeutic Activity 1: Pt sat on EOB with increased posterior lean requiring min to mod assist to maintain seated balance. Repeated cueing to shift wt anteirorly and promote COG. Stood x 2 trials with UE support of walker. Cueing to promote LE and trunk extension into erect stance.    Bed Mobility 1  Bed Mobility 1: Supine to sitting  Level of Assistance 1: Maximum assistance, +2  Bed Mobility Comments 1: HOB elevated, use of draw sheet. slow movements.  Bed Mobility 2  Bed Mobility  2: Sitting to supine  Level of Assistance 2: Maximum assistance, +2    Ambulation/Gait Training 1  Surface 1: Level tile  Device 1: Rolling walker  Assistance 1: Moderate assistance (x2 person assist)  Quality of Gait 1: Decreased step length, Forward " flexed posture, Diminished heel strike, Shuffling gait, Wide base of support, Soft knee(s)  Comments/Distance (ft) 1: 2 lateral sidesteps along bedside, wheeled walker, mod assist x 2. Walker management provided by therapist. Difficulty advancing RLE  Transfer 1  Technique 1: Sit to stand, Stand to sit  Transfer Device 1: Walker  Transfer Level of Assistance 1: Maximum assistance, +2  Trials/Comments 1: x2 trials from elevated EOB         Outcome Measures:  [facility] Basic Mobility  Turning from your back to your side while in a flat bed without using bedrails: A lot  Moving from lying on your back to sitting on the side of a flat bed without using bedrails: Total  Moving to and from bed to chair (including a wheelchair): A lot  Standing up from a chair using your arms (e.g. wheelchair or bedside chair): A lot  To walk in hospital room: Total  Climbing 3-5 steps with railing: Total  Basic Mobility - Total Score: 9    Education Documentation  Precautions, taught by Jossie Corona PT at 1/21/2025  2:55 PM.  Learner: Patient  Readiness: Acceptance  Method: Explanation  Response: Needs Reinforcement  Comment: PT POC    Body Mechanics, taught by Jossie Corona PT at 1/21/2025  2:55 PM.  Learner: Patient  Readiness: Acceptance  Method: Explanation  Response: Needs Reinforcement  Comment: PT POC    Mobility Training, taught by Jossie Corona PT at 1/21/2025  2:55 PM.  Learner: Patient  Readiness: Acceptance  Method: Explanation  Response: Needs Reinforcement  Comment: PT POC    Education Comments  No comments found.        OP EDUCATION:       Encounter Problems       Encounter Problems (Active)       PT Problem       PT Goal 1 (Progressing)       Start:  01/19/25    Expected End:  02/02/25       Pt will ambulate 15' close supervision with walker to promote safe home mobility .         PT Goal 2 (Progressing)       Start:  01/19/25    Expected End:  02/02/25       Pt will transfer from sitting edge of bed to the chair with supervision  assist and walker to promote out of bed activity and reduce the risks of prolonged acute care bedrest          PT Goal 3 (Progressing)       Start:  01/19/25    Expected End:  02/02/25       Pt will complete bed mobility with moderate assist assist including scooting and rolling L/R hand rail use as needed to promote out of bed activity, comfort, and repositioning           PT Goal 4 (Progressing)       Start:  01/19/25    Expected End:  02/02/25       Pt will transfer sit to standing position with moderate assist assist and walker to promote safe out of bed activity             Pain - Adult

## 2025-01-21 NOTE — PROGRESS NOTES
"Subjective   No events overnight.  He reports the RLE weakness is not improved today.  He agrees to move to rehab once he is discharged from the hospital.       Objective   Neurological Exam  Physical Exam    Last Recorded Vitals  Blood pressure 168/77, pulse 77, temperature 35.9 °C (96.6 °F), temperature source Temporal, resp. rate 13, height 1.956 m (6' 5.01\"), weight 116 kg (255 lb 8.2 oz), SpO2 99%.      Neurologically, pt is awake and oriented x4.  Cognition is noted for brighter affect but otherwise still intact as yesterday. no aphasia  Cranial nerves: VFF, EOMI, No nystagmus, Face is symmetric to sensory and motor, no dysarthria, Tongue protrudes midline, Shrug symmetric  Motor: 3/5 strength BUE, 2/5 BLE, weaker on the R than the L  Sensation I s intact B     Scheduled medications  aspirin, 81 mg, oral, Daily  clopidogrel, 75 mg, oral, Daily  docusate sodium, 100 mg, oral, Daily  finasteride, 5 mg, oral, Daily  insulin glargine, 30 Units, subcutaneous, Nightly  insulin lispro, 0-5 Units, subcutaneous, TID AC  insulin lispro, 8 Units, subcutaneous, TID AC  losartan, 100 mg, oral, Daily  tamsulosin, 0.4 mg, oral, Daily      Continuous medications  sodium chloride 0.9%, 75 mL/hr, Last Rate: 75 mL/hr (01/21/25 0317)      PRN medications  PRN medications: acetaminophen, dextrose, dextrose, glucagon, melatonin, sennosides-docusate sodium     Results for orders placed or performed during the hospital encounter of 01/18/25 (from the past 96 hours)   POCT GLUCOSE   Result Value Ref Range    POCT Glucose 107 (H) 74 - 99 mg/dL   ECG 12 lead   Result Value Ref Range    Ventricular Rate 86 BPM    Atrial Rate 86 BPM    AZ Interval 218 ms    QRS Duration 102 ms    QT Interval 368 ms    QTC Calculation(Bazett) 440 ms    P Axis 34 degrees    R Axis -37 degrees    T Axis 38 degrees    QRS Count 14 beats    Q Onset 215 ms    P Onset 106 ms    P Offset 159 ms    T Offset 399 ms    QTC Fredericia 415 ms   CBC and Auto " Differential   Result Value Ref Range    WBC 8.8 4.4 - 11.3 x10*3/uL    nRBC 0.0 0.0 - 0.0 /100 WBCs    RBC 5.08 4.50 - 5.90 x10*6/uL    Hemoglobin 14.5 13.5 - 17.5 g/dL    Hematocrit 44.1 41.0 - 52.0 %    MCV 87 80 - 100 fL    MCH 28.5 26.0 - 34.0 pg    MCHC 32.9 32.0 - 36.0 g/dL    RDW 13.8 11.5 - 14.5 %    Platelets 179 150 - 450 x10*3/uL    Neutrophils % 68.0 40.0 - 80.0 %    Immature Granulocytes %, Automated 0.3 0.0 - 0.9 %    Lymphocytes % 20.7 13.0 - 44.0 %    Monocytes % 6.5 2.0 - 10.0 %    Eosinophils % 4.0 0.0 - 6.0 %    Basophils % 0.5 0.0 - 2.0 %    Neutrophils Absolute 5.99 (H) 1.60 - 5.50 x10*3/uL    Immature Granulocytes Absolute, Automated 0.03 0.00 - 0.50 x10*3/uL    Lymphocytes Absolute 1.82 0.80 - 3.00 x10*3/uL    Monocytes Absolute 0.57 0.05 - 0.80 x10*3/uL    Eosinophils Absolute 0.35 0.00 - 0.40 x10*3/uL    Basophils Absolute 0.04 0.00 - 0.10 x10*3/uL   Comprehensive metabolic panel   Result Value Ref Range    Glucose 94 74 - 99 mg/dL    Sodium 139 136 - 145 mmol/L    Potassium 4.0 3.5 - 5.3 mmol/L    Chloride 106 98 - 107 mmol/L    Bicarbonate 25 21 - 32 mmol/L    Anion Gap 12 10 - 20 mmol/L    Urea Nitrogen 26 (H) 6 - 23 mg/dL    Creatinine 2.11 (H) 0.50 - 1.30 mg/dL    eGFR 33 (L) >60 mL/min/1.73m*2    Calcium 8.6 8.6 - 10.3 mg/dL    Albumin 3.6 3.4 - 5.0 g/dL    Alkaline Phosphatase 63 33 - 136 U/L    Total Protein 8.1 6.4 - 8.2 g/dL    AST 27 9 - 39 U/L    Bilirubin, Total 0.4 0.0 - 1.2 mg/dL    ALT 18 10 - 52 U/L   Sars-CoV-2 and Influenza A/B PCR   Result Value Ref Range    Flu A Result Not Detected Not Detected    Flu B Result Not Detected Not Detected    Coronavirus 2019, PCR Not Detected Not Detected   Troponin I, High Sensitivity, Initial   Result Value Ref Range    Troponin I, High Sensitivity 15 0 - 20 ng/L   BLOOD GAS VENOUS FULL PANEL   Result Value Ref Range    POCT pH, Venous 7.39 7.33 - 7.43 pH    POCT pCO2, Venous 53 (H) 41 - 51 mm Hg    POCT pO2, Venous 26 (L) 35 - 45 mm Hg     POCT SO2, Venous 38 (L) 45 - 75 %    POCT Oxy Hemoglobin, Venous 37.2 (L) 45.0 - 75.0 %    POCT Hematocrit Calculated, Venous 43.0 41.0 - 52.0 %    POCT Sodium, Venous 138 136 - 145 mmol/L    POCT Potassium, Venous 4.5 3.5 - 5.3 mmol/L    POCT Chloride, Venous 107 98 - 107 mmol/L    POCT Ionized Calicum, Venous 1.19 1.10 - 1.33 mmol/L    POCT Glucose, Venous 98 74 - 99 mg/dL    POCT Lactate, Venous 1.5 0.4 - 2.0 mmol/L    POCT Base Excess, Venous 5.6 (H) -2.0 - 3.0 mmol/L    POCT HCO3 Calculated, Venous 32.1 (H) 22.0 - 26.0 mmol/L    POCT Hemoglobin, Venous 14.3 13.5 - 17.5 g/dL    POCT Anion Gap, Venous 3.0 (L) 10.0 - 25.0 mmol/L    Patient Temperature 37.0 degrees Celsius    FiO2 0 %   Troponin, High Sensitivity, 1 Hour   Result Value Ref Range    Troponin I, High Sensitivity 16 0 - 20 ng/L   TSH with reflex to Free T4 if abnormal   Result Value Ref Range    Thyroid Stimulating Hormone 2.32 0.44 - 3.98 mIU/L   Creatine Kinase   Result Value Ref Range    Creatine Kinase 970 (H) 0 - 325 U/L   Urinalysis with Reflex Culture and Microscopic   Result Value Ref Range    Color, Urine Light-Yellow Light-Yellow, Yellow, Dark-Yellow    Appearance, Urine Clear Clear    Specific Gravity, Urine 1.013 1.005 - 1.035    pH, Urine 5.5 5.0, 5.5, 6.0, 6.5, 7.0, 7.5, 8.0    Protein, Urine 20 (TRACE) NEGATIVE, 10 (TRACE), 20 (TRACE) mg/dL    Glucose, Urine Normal Normal mg/dL    Blood, Urine NEGATIVE NEGATIVE    Ketones, Urine NEGATIVE NEGATIVE mg/dL    Bilirubin, Urine NEGATIVE NEGATIVE    Urobilinogen, Urine Normal Normal mg/dL    Nitrite, Urine NEGATIVE NEGATIVE    Leukocyte Esterase, Urine NEGATIVE NEGATIVE   Drug Screen, Urine   Result Value Ref Range    Amphetamine Screen, Urine Presumptive Negative Presumptive Negative    Barbiturate Screen, Urine Presumptive Negative Presumptive Negative    Benzodiazepines Screen, Urine Presumptive Negative Presumptive Negative    Cannabinoid Screen, Urine Presumptive Negative Presumptive  Negative    Cocaine Metabolite Screen, Urine Presumptive Negative Presumptive Negative    Fentanyl Screen, Urine Presumptive Negative Presumptive Negative    Opiate Screen, Urine Presumptive Negative Presumptive Negative    Oxycodone Screen, Urine Presumptive Negative Presumptive Negative    PCP Screen, Urine Presumptive Negative Presumptive Negative    Methadone Screen, Urine Presumptive Negative Presumptive Negative   Urinalysis Microscopic   Result Value Ref Range    WBC, Urine 1-5 1-5, NONE /HPF    RBC, Urine NONE NONE, 1-2, 3-5 /HPF   Basic Metabolic Panel   Result Value Ref Range    Glucose 94 74 - 99 mg/dL    Sodium 138 136 - 145 mmol/L    Potassium 4.1 3.5 - 5.3 mmol/L    Chloride 107 98 - 107 mmol/L    Bicarbonate 24 21 - 32 mmol/L    Anion Gap 11 10 - 20 mmol/L    Urea Nitrogen 23 6 - 23 mg/dL    Creatinine 1.95 (H) 0.50 - 1.30 mg/dL    eGFR 36 (L) >60 mL/min/1.73m*2    Calcium 8.3 (L) 8.6 - 10.3 mg/dL   Cortisol AM   Result Value Ref Range    Cortisol  A.M. 8.5 5.0 - 20.0 ug/dL   POCT GLUCOSE   Result Value Ref Range    POCT Glucose 82 74 - 99 mg/dL   POCT GLUCOSE   Result Value Ref Range    POCT Glucose 154 (H) 74 - 99 mg/dL   POCT GLUCOSE   Result Value Ref Range    POCT Glucose 171 (H) 74 - 99 mg/dL   POCT GLUCOSE   Result Value Ref Range    POCT Glucose 254 (H) 74 - 99 mg/dL   Basic Metabolic Panel   Result Value Ref Range    Glucose 88 74 - 99 mg/dL    Sodium 136 136 - 145 mmol/L    Potassium 4.0 3.5 - 5.3 mmol/L    Chloride 108 (H) 98 - 107 mmol/L    Bicarbonate 24 21 - 32 mmol/L    Anion Gap 8 (L) 10 - 20 mmol/L    Urea Nitrogen 19 6 - 23 mg/dL    Creatinine 1.59 (H) 0.50 - 1.30 mg/dL    eGFR 46 (L) >60 mL/min/1.73m*2    Calcium 7.8 (L) 8.6 - 10.3 mg/dL   Creatine Kinase   Result Value Ref Range    Creatine Kinase 775 (H) 0 - 325 U/L   POCT GLUCOSE   Result Value Ref Range    POCT Glucose 77 74 - 99 mg/dL   POCT GLUCOSE   Result Value Ref Range    POCT Glucose 176 (H) 74 - 99 mg/dL   POCT GLUCOSE    Result Value Ref Range    POCT Glucose 184 (H) 74 - 99 mg/dL   Troponin I, High Sensitivity   Result Value Ref Range    Troponin I, High Sensitivity 14 0 - 20 ng/L   Electrocardiogram, 12-lead PRN ACS symptoms   Result Value Ref Range    Ventricular Rate 40 BPM    Atrial Rate 44 BPM    QRS Duration 114 ms    QT Interval 494 ms    QTC Calculation(Bazett) 402 ms    P Axis 67 degrees    R Axis -40 degrees    T Axis -33 degrees    QRS Count 6 beats    Q Onset 209 ms    T Offset 456 ms    QTC Fredericia 431 ms   POCT GLUCOSE   Result Value Ref Range    POCT Glucose 121 (H) 74 - 99 mg/dL   POCT GLUCOSE   Result Value Ref Range    POCT Glucose 117 (H) 74 - 99 mg/dL   Creatine Kinase   Result Value Ref Range    Creatine Kinase 735 (H) 0 - 325 U/L   Basic Metabolic Panel   Result Value Ref Range    Glucose 88 74 - 99 mg/dL    Sodium 137 136 - 145 mmol/L    Potassium 4.3 3.5 - 5.3 mmol/L    Chloride 107 98 - 107 mmol/L    Bicarbonate 25 21 - 32 mmol/L    Anion Gap 9 (L) 10 - 20 mmol/L    Urea Nitrogen 20 6 - 23 mg/dL    Creatinine 1.85 (H) 0.50 - 1.30 mg/dL    eGFR 38 (L) >60 mL/min/1.73m*2    Calcium 8.1 (L) 8.6 - 10.3 mg/dL   POCT GLUCOSE   Result Value Ref Range    POCT Glucose 85 74 - 99 mg/dL   Transthoracic Echo (TTE) Complete   Result Value Ref Range    AV pk hetal 1.07 m/s    LVOT diam 2.30 cm    MV E/A ratio 0.57     Tricuspid annular plane systolic excursion 1.8 cm    LV Biplane EF 57 %    LA vol index A/L 29.8 ml/m2    LV EF 58 %    RV free wall pk S' 12.70 cm/s    RVSP 19.3 mmHg    LVIDd 3.49 cm    AV pk grad 5 mmHg    Aortic Valve Area by Continuity of Peak Velocity 3.53 cm2    LV A4C EF 57.5    POCT GLUCOSE   Result Value Ref Range    POCT Glucose 192 (H) 74 - 99 mg/dL   POCT GLUCOSE   Result Value Ref Range    POCT Glucose 216 (H) 74 - 99 mg/dL          MR brain wo IV contrast    Result Date: 1/21/2025  Interpreted By:  Del Alexander, STUDY: MR BRAIN WO IV CONTRAST; ;  1/21/2025 11:56 am   INDICATION:  Signs/Symptoms:CVA.     COMPARISON: CT head and CTA head from 01/20/2025. MRI brain from 12/10/2022.   ACCESSION NUMBER(S): [id number]   ORDERING CLINICIAN: SHAKIR WHALEY   TECHNIQUE: MRI of the brain was performed with the acquisition of axial diffusion-weighted, axial FLAIR, axial T2 fat saturated, axial T2 gradient echo, and sagittal and axial T1 weighted sequences were performed.   FINDINGS: There is restricted diffusion located within the left parasagittal frontal lobe and within the anterior portion of the left corpus callosum which is consistent with acute/subacute infarcts. There is no hemorrhagic transformation and there is no striking mass effect. There is associated FLAIR hyperintense signal in the region of the infarcts.   Ventricles, sulci, and basilar cisterns are prominent size due to age related diffuse cerebral volume loss. Stable mild ex vacuo dilatation of the left lateral ventricle due to adjacent old basal ganglia infarct.   There are small old infarcts located within the bilateral basal ganglia and thalami, unchanged in appearance compared the prior MRI. Stable small focus of T2 hyperintensity located within the white matter within the posterior limb of the right internal capsule which is most consistent with an old infarct. There are small foci of T2 hyperintensity located within the zee which are most consistent with old infarcts. There are small old infarcts in the right cerebellum.   There are confluence regions of T2 hyperintensity within the bilateral cerebral hemispheric white matter which are probably sequela of chronic small vessel ischemic changes.   There are tiny mucosal cysts located within the right maxillary sinus and within the sphenoid sinus, otherwise the visualized paranasal sinuses and mastoid air cells are essentially clear.       1. Acute or subacute infarcts located within the left cerebral hemisphere in the territory of the PAULIE. There is no striking mass effect  and there is no hemorrhagic transformation.   2. Chronic intracranial findings as above.   This study was interpreted at [facility].   MACRO: None   Signed by: Del Alexander 1/21/2025 1:00 PM Dictation workstation:   [id number]    Electrocardiogram, 12-lead PRN ACS symptoms    Result Date: 1/21/2025  Marked sinus bradycardia with AV dissociation and Junctional bradycardia with Sinus/atrial capture with Fusion complexes Left axis deviation T wave abnormality, consider lateral ischemia Abnormal ECG When compared with ECG of 18-JAN-2025 19:58, (unconfirmed) Significant changes have occurred Confirmed by Camila Olivarez ([id number]) on 1/21/2025 10:08:20 AM    Transthoracic Echo (TTE) Complete    Result Date: 1/21/2025            [address], [place] [address]             Phone [phone] TRANSTHORACIC ECHOCARDIOGRAM REPORT Patient Name:       BUSHRA MCLEAN          Reading Physician:    [id number] Camila Olivarez MD Study Date:         1/21/2025            Ordering Provider:    [id number] WERNER JEWELL MRN/PID:            [id number]             Fellow: Accession#:         [id number]         Nurse: Date of Birth/Age:  1955 / 69 years  Sonographer:          Darya Womack RD Gender Assigned at                      Additional Staff: Birth: Height:             195.58 cm            Admit Date: Weight:             117.94 kg            Admission Status:     Inpatient -                                                                Routine BSA / BMI:          2.50 m2 / 30.83      Department Location:  [facility]                     kg/m2 Blood Pressure: 162 /79 mmHg Study Type:    TRANSTHORACIC ECHO (TTE) COMPLETE Diagnosis/ICD: Bradycardia, unspecified-R00.1; Abnormal  electrocardiogram [ECG]                [EKG]-R94.31 Indication:    BRADYCARDIA, ABN EKG CPT Codes:     Echo Complete w Full Doppler-[id number] Patient History: Diabetes:          Yes Pertinent History: HTN, Hyperlipidemia, CAD, CVA, PVD and H/O PFO repair,                    Bradycardia, ABN EKG. Study Detail: The following Echo studies were performed: 2D, M-Mode, Doppler and               color flow.  PHYSICIAN INTERPRETATION: Left Ventricle: The left ventricular systolic function is normal, with a visually estimated ejection fraction of 55-60%. There are no regional wall motion abnormalities. The left ventricular cavity size is normal. There is normal septal and normal posterior left ventricular wall thickness. There is left ventricular concentric remodeling. Spectral Doppler shows a Grade I (impaired relaxation pattern) of left ventricular diastolic filling with normal left atrial filling pressure. Left Atrium: The left atrium is mildly dilated. Right Ventricle: The right ventricle is normal in size. There is normal right ventricular global systolic function. Right Atrium: The right atrium is normal in size. Aortic Valve: The aortic valve is trileaflet. There is no evidence of aortic valve regurgitation. The peak instantaneous gradient of the aortic valve is 5 mmHg. Mitral Valve: The mitral valve is normal in structure. There is trace mitral valve regurgitation. Tricuspid Valve: The tricuspid valve is structurally normal. There is trace tricuspid regurgitation. The right ventricular systolic pressure is unable to be estimated. Pulmonic Valve: The pulmonic valve is structurally normal. There is physiologic pulmonic valve regurgitation. Pericardium: No pericardial effusion noted. Aorta: The aortic root is normal.  CONCLUSIONS:  1. Poorly visualized anatomical structures due to suboptimal image quality.  2. The left ventricular systolic function is normal, with a visually estimated ejection fraction of 55-60%.  3.  Spectral Doppler shows a Grade I (impaired relaxation pattern) of left ventricular diastolic filling with normal left atrial filling pressure.  4. There is normal right ventricular global systolic function.  5. Trace mitral valve regurgitation.  6. Trace tricuspid regurgitation is visualized. QUANTITATIVE DATA SUMMARY:  2D MEASUREMENTS:           Normal Ranges: LAs:             2.90 cm   (2.7-4.0cm) IVSd:            0.94 cm   (0.6-1.1cm) LVPWd:           0.97 cm   (0.6-1.1cm) LVIDd:           3.49 cm   (3.9-5.9cm) LVIDs:           2.60 cm LV Mass Index:   38.7 g/m2 LV % FS          25.5 %  LA VOLUME:                    Normal Ranges: LA Vol A4C:        63.8 ml    (22+/-6mL/m2) LA Vol A2C:        70.4 ml LA Vol BP:         74.6 ml LA Vol Index A4C:  25.5ml/m2 LA Vol Index A2C:  28.1 ml/m2 LA Vol Index BP:   29.8 ml/m2 LA Area A4C:       18.9 cm2 LA Area A2C:       22.1 cm2 LA Major Axis A4C: 4.8 cm LA Major Axis A2C: 5.9 cm LA Volume Index:   27.9 ml/m2 LA Vol A4C:        57.9 ml LA Vol A2C:        68.2 ml LA Vol Index BSA:  25.2 ml/m2  RA VOLUME BY A/L METHOD:          Normal Ranges: RA Area A4C:             12.5 cm2  M-MODE MEASUREMENTS:         Normal Ranges: Ao Root:             3.20 cm (2.0-3.7cm)  AORTA MEASUREMENTS:         Normal Ranges: Asc Ao, d:          2.90 cm (2.1-3.4cm)  LV SYSTOLIC FUNCTION BY 2D PLANIMETRY (MOD):                      Normal Ranges: EF-A4C View:    58 % (>=55%) EF-A2C View:    52 % EF-Biplane:     57 % EF-Visual:      58 % LV EF Reported: 58 %  LV DIASTOLIC FUNCTION:           Normal Ranges: MV Peak E:             0.54 m/s  (0.7-1.2 m/s) MV Peak A:             0.96 m/s  (0.42-0.7 m/s) E/A Ratio:             0.57      (1.0-2.2) MV e'                  0.047 m/s (>8.0) MV lateral e'          0.06 m/s MV medial e'           0.03 m/s E/e' Ratio:            11.48     (<8.0)  MITRAL VALVE:          Normal Ranges: MV DT:        263 msec (150-240msec)  AORTIC VALVE:           Normal Ranges:  AoV Vmax:      1.07 m/s (<=1.7m/s) AoV Peak P.6 mmHg (<20mmHg) LVOT Max Jarrett:  0.91 m/s (<=1.1m/s) LVOT Diameter: 2.30 cm  (1.8-2.4cm) AoV Area,Vmax: 3.53 cm2 (2.5-4.5cm2)  RIGHT VENTRICLE: RV Basal 3.50 cm RV Mid   1.34 cm RV Major 7.5 cm TAPSE:   17.8 mm RV s'    0.13 m/s  TRICUSPID VALVE/RVSP:          Normal Ranges: Peak TR Velocity:     2.02 m/s RV Syst Pressure:     19 mmHg  (< 30mmHg)  [id number] Camila Olivarez MD Electronically signed on 2025 at 9:32:17 AM  ** Final **     US thyroid    Result Date: 2025  Interpreted By:  Jose Sanders, STUDY: US THYROID; 2025 7:35 pm   INDICATION: Signs/Symptoms:Multinodular Thyroid on CT.   COMPARISON: None.   ACCESSION NUMBER(S): [id number]   ORDERING CLINICIAN: SHAKIR WHALEY   TECHNIQUE: Grayscale and color Doppler imaging of the thyroid gland was performed.   FINDINGS: There is a diffusely heterogeneous appearance of the echo pattern throughout the right and left lobes of the thyroid gland with no normal-appearing thyroid tissue identified. Multiple small cysts are identified bilaterally.   Right lobe of the thyroid: 5.5 cm x 2.8 cm x 2.5 cm..   Left lobe of the thyroid: 4.8 cm x 2.3 cm x 1.9 cm. There are 2 similar-appearing small nodules within left lobe of the thyroid gland midpole, wider than tall, smooth well-defined margins, isoechoic in appearance without microcalcifications measuring 7 mm and 5 mm. These are TI-RADS category 3..   Thyroid isthmus: 1.2 cm..       Diffusely heterogeneous appearance of the thyroid tissue bilaterally with no normal thyroid tissue identified. There are 2 nodules on the left each with a TI-RADS category 3. No specific follow-up is recommended based on TI-RADS category 3 and less than 1 cm in size.   Signed by: Jose Sanders 2025 8:04 AM Dictation workstation:   [id number]    CT angio head and neck w and wo IV contrast    Result Date: 2025  Interpreted By:  Mateusz Wright, STUDY: CT ANGIO HEAD AND NECK  W AND WO IV CONTRAST;  1/20/2025 5:12 pm   INDICATION: Signs/Symptoms:Brain attack.     COMPARISON: CT head today.   ACCESSION NUMBER(S): [id number]   ORDERING CLINICIAN: SHAKIR WHALEY   TECHNIQUE: 75 ML of Omnipaque 350 was administered intravenously and axial images of the head and neck were acquired.  Coronal, sagittal, and 3-D reconstructions were provided for review.   FINDINGS:   CTA COW: No major vascular occlusion. Abrupt cut off flow related enhancement A2 segment left anterior cerebral artery, suspected thrombosis, with faint distal reconstitution. Moderate short-segment stenosis of the A2 segment right anterior cerebral artery. No aneurysms.  No vascular malformations.   CTA CAROTID: No aortic aneurysm or dissection. No significant stenoses at the origins of the great vessels from the aortic arch. No significant stenoses of the brachycephalic artery or bilateral subclavian arteries.   No significant stenoses bilateral carotid arterial systems. Luminal irregularity of the proximal bilateral cervical internal carotid artery suspected be related to fibrofatty plaque rather than connective tissue disorder.   Severe short-segment stenosis V1 segment right vertebral artery. Moderate short-segment stenosis of the V1 segment of the left vertebral artery.   NONVASCULAR NECK: No soft tissue mass. No adenopathy. Salivary glands are unremarkable. Multinodular thyroid. Bones intact.         1. No LVO. Abrupt cut off flow related enhancement A2 segment left anterior cerebral artery, suspected thrombosis, with faint distal reconstitution. 2. Severe short-segment stenosis V1 segment right vertebral artery. Moderate short-segment stenosis of the V1 segment of the left vertebral artery. Suspect atherosclerotic involvement though thrombosis not excluded. 3. Multinodular thyroid. Suggest correlation with thyroid ultrasound to assess for possible neoplasm.   MACRO: None   Signed by: Mateusz Wright 1/20/2025 6:41 PM  Dictation workstation:   [id number]    ECG 12 lead    Result Date: 1/20/2025  Sinus rhythm with 1st degree AV block Left axis deviation Left anterior hemiblock Minimal voltage criteria for LVH, may be normal variant ( Roberto product ) Abnormal ECG When compared with ECG of 23-SEP-2021 12:47, Previous ECG has undetermined rhythm, needs review Confirmed by Jaime Ross ([id number]) on 1/20/2025 3:19:45 PM    CT head wo IV contrast    Result Date: 1/20/2025  Interpreted By:  Maria Guadalupe Barragan, STUDY: CT HEAD WO IV CONTRAST;  1/20/2025 2:48 pm   INDICATION: Signs/Symptoms:altered mental status.  Generalized weakness     COMPARISON: 01/18/2025   ACCESSION NUMBER(S): [id number]   ORDERING CLINICIAN: WERNER JEWELL   TECHNIQUE: Noncontrast axial CT scan of head was performed. Angled reformats in brain and bone windows were generated. The images were reviewed in bone, brain, blood and soft tissue windows.   FINDINGS: CSF Spaces: There is ventricular enlargement with proportionate deepening and widening of the sulci and the sylvian fissures.. There is no extraaxial fluid collection.   Parenchyma: There are areas of diminished density seen within the white matter of each cerebral hemisphere secondary to chronic microvascular ischemic disease. There is an old lacunar infarct within the right basal ganglia extending into the corona radiata with old lacunar infarct of the left corona radiata noted. The grey-white differentiation is intact. There is no mass effect or midline shift. There is no intracranial hemorrhage.   Calvarium: The calvarium is unremarkable.   Paranasal sinuses and mastoids: There is some fluid identified within left sphenoid sinus. The remaining visualized paranasal sinuses are clear.       Atrophy and chronic microvascular ischemic disease with old lacunar infarcts seen bilaterally within the corona radiata.   No acute intracranial process.   No evidence of intracranial hemorrhage or displaced skull fracture.    MACRO: None     Signed by: Maria Guadalupe Barragan 1/20/2025 3:06 PM Dictation workstation:   [id number]    CT head wo IV contrast    Result Date: 1/18/2025  Interpreted By:  Prabhjot Ballard, STUDY: CT HEAD WO IV CONTRAST;  1/18/2025 8:57 pm   INDICATION: Signs/Symptoms:leg weakness.   COMPARISON: 11/16/2020   ACCESSION NUMBER(S): [id number]   ORDERING CLINICIAN: BOBO DOYLE   TECHNIQUE: Axial noncontrast CT images of the head. Sagittal and coronal reformats were provided.   FINDINGS: BRAIN: No acute intracranial hemorrhage. No mass effect or midline shift. Gray-white matter interfaces are preserved.Patchy white matter hypodensities which are nonspecific but likely related to microangiopathic white matter changes. Remote bilateral basal ganglia and right pontine lacunar infarcts. VENTRICLES and EXTRA-AXIAL SPACES: Prominent ventricles and extra-axial CSF spaces, reflecting parenchymal volume loss.   EXTRACRANIAL SOFT TISSUES:  Scattered soft tissue calcifications and subcutaneous fat stranding which appears similar to the prior exam.   PARANASAL SINUSES/MASTOIDS: The visualized paranasal sinuses and mastoid air cells are aerated.   BONES AND ORBITS: No displaced skull fracture. Status post bilateral lens replacement.   OTHER FINDINGS: None.       1. No acute intracranial hemorrhage or mass effect. 2. Volume loss and patchy white matter hypoattenuation, likely sequelae of chronic microvascular ischemic change. Bilateral basal ganglia lacunar infarcts and right pontine which appear similar to 11/16/2020.   Signed by: Prabhjot Ballard 1/18/2025 9:59 PM Dictation workstation:   [id number]    XR chest 1 view    Result Date: 1/18/2025  STUDY: Chest Radiograph;  01/18/2025 8:57 PM INDICATION: Weakness. COMPARISON: 12/22/2022 XR Chest. 09/23/2021 XR Chest. ACCESSION NUMBER(S): [id number] ORDERING CLINICIAN: BOBO DOYLE TECHNIQUE:  Frontal chest was obtained at 20:57 hours. FINDINGS: CARDIOMEDIASTINAL SILHOUETTE:  Cardiomediastinal silhouette is normal in size and configuration.  LUNGS: Lungs are clear.  ABDOMEN: No remarkable upper abdominal findings.  BONES: No acute osseous changes.    Clear lungs. Normal heart size. Borderline inspiration.. 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  Due to his myopathy he is never allowed to take statins.  He is appropriately on aspirin and Plavix and should continue these for the next month.  He should follow-up with his outpatient neurologist for further evaluation and management.  The rest of his stroke evaluation is reassuring and his echocardiogram reveals no cardiomyopathy.  Intracranial vasculature is similarly reassuring.  If he needs a stroke neurologist he may follow-up with me as an outpatient in 4 weeks.  I will sign off.  Please call with further questions or concerns.       I personally spent 32 minutes today, exclusive of procedures, providing care for this patient, including preparation, face to face time, documentation and other services such as review of medical records, diagnostic result, patient education, counseling, coordination of care as specified in the encounter.

## 2025-01-21 NOTE — PROGRESS NOTES
Occupational Therapy    Occupational Therapy Treatment    Name: Tony King  MRN: [id number]  Department: [facility] MRI  Room: [id number]  Date: 01/21/25  Time Calculation  Start Time: 1003  Stop Time: 1028  Time Calculation (min): 25 min    Assessment:  OT Assessment: Slow progress towards OT POC; remains limited by decreased balance, strength, and overall cognition. OT frequency increased to 4x/week d/t evolving medical status including acute CVA on MRI.  Prognosis: Good  Barriers to Discharge Home: Physical needs  Physical Needs: 24hr mobility assistance needed, 24hr ADL assistance needed, High falls risk due to function or environment  Evaluation/Treatment Tolerance: Patient limited by fatigue  Medical Staff Made Aware: Yes  End of Session Communication: Bedside nurse  End of Session Patient Position: Bed, 3 rail up, Alarm on  Plan:  Treatment Interventions: ADL retraining, Functional transfer training, UE strengthening/ROM, Endurance training, Cognitive reorientation, Patient/family training, Equipment evaluation/education, Neuromuscular reeducation, Compensatory technique education  OT Frequency: 4 times per week  OT Discharge Recommendations: High intensity level of continued care  Equipment Recommended upon Discharge: Lift  OT Recommended Transfer Status: Assist of 2  OT - OK to Discharge: Yes    Subjective   Previous Visit Info:  OT Last Visit  OT Received On: 01/21/25  General:  General  Reason for Referral: Decreased ADLS, generalized weakness  Referred By: Sonia Brown MD  Past Medical History Relevant to Rehab: Chronic kidney disease, unspecified (09/26/2014), Essential (primary) hypertension (09/26/2014), History of deep venous thrombosis (12/22/2022), History of diabetes mellitus (04/05/2024), History of elevated lipids (04/05/2024), Patent foramen ovale ([facility]) (09/26/2014), Personal history of other endocrine, nutritional and metabolic disease (09/26/2014), Personal history of other endocrine,  nutritional and metabolic disease (09/26/2014), Personal history of other venous thrombosis and embolism (09/26/2014), Personal history of pulmonary embolism (12/22/2022), Personal history of transient ischemic attack (TIA), and 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  Family/Caregiver Present: No  Prior to Session Communication: Bedside nurse  Patient Position Received: Bed, 3 rail up, Alarm on  Preferred Learning Style: visual, verbal    General Comment: Cleared by nsg, pt met in supine, agreeable to OT session. Pt reassessed after xfer to step down unit after rapid response called 1/20 with concern for syncope/bradycardia secondary to A-V dissociation. + acute CVA on MRI 1/21.  per cardio: manage medically, awaiting EP input.    Precautions:  Hearing/Visual Limitations: glasses  Medical Precautions: Fall precautions  Precautions Comment: MRI brain 1/21: Acute or subacute infarcts located within the left cerebral  hemisphere      Vital Signs Comment: HR b/w 59-66bpm throughout session. BP sitting /75(99)    Pain Assessment:  Pain Assessment  Pain Assessment: 0-10  0-10 (Numeric) Pain Score: 0 - No pain     Objective   Cognition:  Overall Cognitive Status: Impaired  Orientation Level: Disoriented to situation  Following Commands: Follows one step commands with repetition  Safety Judgment: Decreased awareness of need for safety precautions  Cognition Comments: flat affect. easily distracted. difficulty with processing. expressive deficits noted.    Activities of Daily Living:   Feeding  Feeding Level of Assistance: Setup  Feeding Where Assessed: Bed level  Feeding Comments: items placed within reach, assist to cut food into smaller pieces    Toileting  Toileting Level of Assistance: Dependent  Where Assessed: Other (Comment)  Toileting Comments: purewick cath in place    Bed Mobility/Transfers:   Bed Mobility  Bed Mobility: Yes  Bed Mobility  1  Bed Mobility 1: Supine to sitting  Level of Assistance 1: Maximum assistance, +2  Bed Mobility Comments 1: HOB elevated; effortful mgmt of RLE and trunk up  Bed Mobility 2  Bed Mobility  2: Sitting to supine  Level of Assistance 2: Maximum assistance, +2  Bed Mobility Comments 2: to elevate LEs and control descent  Bed Mobility 3  Bed Mobility 3: Scooting  Level of Assistance 3: Dependent  Bed Mobility Comments 3: dep x2 scoot towards HOB    Transfers  Transfer: No (not safe to attempt d/t pt's sitting balance at EOB)    Functional Mobility:  Functional Mobility  Functional Mobility Performed: No    Sitting Balance:  Static Sitting Balance  Static Sitting-Balance Support: Feet supported, Bilateral upper extremity supported  Static Sitting-Level of Assistance: Moderate assistance  Static Sitting-Comment/Number of Minutes: retropulsive, difficulty shifting COG over BRITTANIE    Therapy/Activity:   Therapeutic Exercise  Therapeutic Exercise Performed: Yes  Therapeutic Exercise Activity 1: BUE ther ex performed with pt in supine today, 1 set x 10 reps with emphasis on shoulder flexion and bicep flexion.  cues to maintain attn to task and to advance activity    Therapeutic Activity  Therapeutic Activity Performed: Yes  Therapeutic Activity 1: EOB sitting for ~10 minutes with fair- sitting balance. challenged to actively participate in postural control and adjustments.  fair- carryover of instruction    Outcome Measures:  [facility] Daily Activity  Putting on and taking off regular lower body clothing: Total  Bathing (including washing, rinsing, drying): A lot  Putting on and taking off regular upper body clothing: A lot  Toileting, which includes using toilet, bedpan or urinal: Total  Taking care of personal grooming such as brushing teeth: A little  Eating Meals: A little  Daily Activity - Total Score: 12        Education Documentation  Body Mechanics, taught by Abdirizak Lucas OT at 1/21/2025 11:15 AM.  Learner:  Patient  Readiness: Acceptance  Method: Explanation  Response: Needs Reinforcement    Precautions, taught by Abdirizak Lucas OT at 1/21/2025 11:15 AM.  Learner: Patient  Readiness: Acceptance  Method: Explanation  Response: Needs Reinforcement    Education Comments  No comments found.      Goals:  Encounter Problems       Encounter Problems (Active)       OT Goals       ADL's (Progressing)       Start:  01/20/25    Expected End:  02/03/25       Patient will complete ADL tasks, with minimal assist  using AE need in order to increase patient's safety and independence with self-care tasks.           Functional mobility (Progressing)       Start:  01/20/25    Expected End:  02/03/25       Patient will complete functional transfers with minimal assist  using least restrictive device, in order to increase patient's safety and independence with daily tasks.           Activity tolerance (Progressing)       Start:  01/20/25    Expected End:  02/03/25       Patient will demonstrate the ability to participate in functional activity at least >/=  minutes in order to increase patient's safety and independence with daily tasks.

## 2025-01-21 NOTE — CONSULTS
Inpatient consult to Cardiology  Consult performed by: Camila Olivarez MD  Consult ordered by: Ivis Vargas PA-C  Reason for consult: Bradycardia, syncope        History Of Present Illness:    Tony King is a 69 y.o. male presenting with weakness fall.  Patient not great historian apparently was admitted to the hospital from home.  Patient lives with his sister.  Apparently has been having issues with standing weakness requiring more assistance.  Yesterday patient had an episode of possible syncope with severe bradycardia possible A-V dissociation.  The same for management.  Echocardiogram reviewed showed normal ejection fraction no major valve abnormalities.  Patient currently undergoing MRI.  At home taking labetalol 100 mg oral twice daily which was stopped.    Review of systems.  10 point review of systems limited patient is poor historian.    Last Recorded Vitals:  Vitals:    01/21/25 0332 01/21/25 0412 01/21/25 0744 01/21/25 1003   BP:  159/61 172/61 157/59   BP Location:  Right arm Left arm Left arm   Patient Position:  Lying Lying    Pulse:  61 65 66   Resp:  15 13    Temp: 36.3 °C (97.3 °F)  35.7 °C (96.3 °F)    TempSrc: Temporal  Temporal    SpO2:  99% 100%    Weight: 116 kg (255 lb 8.2 oz)      Height:           Last Labs:  CBC - 1/18/2025:  8:17 PM  8.8 14.5 179    44.1      CMP - 1/21/2025:  7:10 AM  8.1 8.1 27 --- 0.4   _ 3.6 18 63      PTT - No results in last year.  _   _ _     Troponin I, High Sensitivity   Date/Time Value Ref Range Status   01/20/2025 03:00 PM 14 0 - 20 ng/L Final   01/18/2025 09:15 PM 16 0 - 20 ng/L Final   01/18/2025 08:17 PM 15 0 - 20 ng/L Final     BNP   Date/Time Value Ref Range Status   09/23/2021 02:04 PM 18 0 - 99 pg/mL Final     Comment:     .  <100 pg/mL - Heart failure unlikely  100-299 pg/mL - Intermediate probability of acute heart  .               failure exacerbation. Correlate with clinical  .               context and patient history.    >=300 pg/mL - Heart  Failure likely. Correlate with clinical  .               context and patient history.  BNP testing is performed using different testing   methodology at [facility] than at other   [facility] hospitals. Direct result comparisons should   only be made within the same method.       Hemoglobin A1C   Date/Time Value Ref Range Status   06/21/2023 05:12 PM 7.3 (H) 4.0 - 6.0 % Final     Comment:     Hemoglobin A1C levels are related to mean blood glucose during the   preceding 2-3 months. The relationship table below may be used as a   general guide. Each 1% increase in HGB A1C is a reflection of an   increase in mean glucose of approximately 30 mg/dl.   Reference: Diabetes Care, volume 29, supplement 1 Jan. 2006                        HGB A1C ................. Approx. Mean Glucose   _______________________________________________   6%   ...............................  120 mg/dl   7%   ...............................  150 mg/dl   8%   ...............................  180 mg/dl   9%   ...............................  210 mg/dl   10%  ...............................  240 mg/dl  Performed at [address] [address]     01/06/2023 04:48 AM 7.9 (H) 4.0 - 6.0 % Final     Comment:     Hemoglobin A1C levels are related to mean blood glucose during the   preceding 2-3 months. The relationship table below may be used as a   general guide. Each 1% increase in HGB A1C is a reflection of an   increase in mean glucose of approximately 30 mg/dl.   Reference: Diabetes Care, volume 29, supplement 1 Jan. 2006                        HGB A1C ................. Approx. Mean Glucose   _______________________________________________   6%   ...............................  120 mg/dl   7%   ...............................  150 mg/dl   8%   ...............................  180 mg/dl   9%   ...............................  210 mg/dl   10%  ...............................  240 mg/dl  Performed at [place] [address] [facility] [address]       LDL Calculated   Date/Time Value Ref Range Status   06/21/2023 05:12  65 - 130 MG/DL Final   12/23/2022 05:33 AM 93 65 - 130 MG/DL Final   11/29/2021 10:46  (H) 65 - 130 MG/DL Final      Last I/O:  I/O last 3 completed shifts:  In: 1226.3 (10.6 mL/kg) [P.O.:300; I.V.:926.3 (8 mL/kg)]  Out: 550 (4.7 mL/kg) [Urine:550 (0.1 mL/kg/hr)]  Weight: 115.9 kg     Past Cardiology Tests (Last 3 Years):  EKG:  Electrocardiogram, 12-lead PRN ACS symptoms 01/20/2025      ECG 12 lead 01/18/2025    Echo:  Transthoracic Echo (TTE) Complete 01/21/2025    Ejection Fractions:  EF   Date/Time Value Ref Range Status   01/21/2025 09:30 AM 58 %      Cath:  No results found for this or any previous visit from the past 1095 days.    Stress Test:  No results found for this or any previous visit from the past 1095 days.    Cardiac Imaging:  No results found for this or any previous visit from the past 1095 days.      Past Medical History:  He has a past medical history of Chronic kidney disease, unspecified (09/26/2014), Essential (primary) hypertension (09/26/2014), History of deep venous thrombosis (12/22/2022), History of diabetes mellitus (04/05/2024), History of elevated lipids (04/05/2024), Patent foramen ovale ([facility]) (09/26/2014), Personal history of other endocrine, nutritional and metabolic disease (09/26/2014), Personal history of other endocrine, nutritional and metabolic disease (09/26/2014), Personal history of other venous thrombosis and embolism (09/26/2014), Personal history of pulmonary embolism (12/22/2022), Personal history of transient ischemic attack (TIA), and 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    Past Surgical History:  He has a past surgical history that includes US guided percutaneous placement (7/20/2020).      Social History:  He reports that he has never smoked. He has never used smokeless tobacco. He  "reports that he does not drink alcohol and does not use drugs.    Family History:  No family history on file.     Allergies:  Statins-hmg-coa reductase inhibitors, Ciprofloxacin, and Penicillins    Inpatient Medications:  Scheduled medications   Medication Dose Route Frequency    aspirin  81 mg oral Daily    clopidogrel  75 mg oral Daily    docusate sodium  100 mg oral Daily    finasteride  5 mg oral Daily    insulin glargine  30 Units subcutaneous Nightly    insulin lispro  0-5 Units subcutaneous TID AC    insulin lispro  8 Units subcutaneous TID AC    losartan  100 mg oral Daily    tamsulosin  0.4 mg oral Daily     PRN medications   Medication    acetaminophen    dextrose    dextrose    glucagon    melatonin    sennosides-docusate sodium     Continuous Medications   Medication Dose Last Rate    sodium chloride 0.9%  75 mL/hr 75 mL/hr (01/21/25 7125)     Outpatient Medications:  Current Outpatient Medications   Medication Instructions    amLODIPine (Norvasc) 2.5 mg tablet Take by mouth.    aspirin 81 mg, oral, Daily    BD Ultra-Fine Orig Pen Needle 29 gauge x 1/2\" needle USE AS DIRECTED... 4 TIMES A DAY FOR 90 DAYS    blood sugar diagnostic (Contour Next Test Strips) strip USE AS DIRECTED 4 TIMES A DAY    clopidogrel (PLAVIX) 75 mg, oral, Daily    Dexcom G6  misc Dexcom G6  - as directed 14 Jun, 2019 Active    Dexcom G6 Sensor device Dexcom G6 Sensor - Check glucose 4x/day for 30 day(s) 14 Jun, 2019 Active    Dexcom G6 Transmitter device Dexcom G6 Transmitter - as directed 14 Jun, 2019 Active    docusate sodium (Colace) 100 mg capsule oral    ergocalciferol (VITAMIN D-2) 1,250 mcg, oral, Once Weekly    finasteride (PROSCAR) 5 mg, oral, Daily    glucagon 1 mg injection intramuscular    HYDROcodone-acetaminophen (Norco) 5-325 mg tablet oral    insulin lispro (HumaLOG KwikPen Insulin) 100 unit/mL injection Inject 20 Units under the skin once daily with breakfast AND 20 Units once daily at noon. Take " with meals AND 20 Units once daily in the evening. Take with meals. Take as directed per insulin instructions.    irbesartan (AVAPRO) 300 mg, oral, Daily    ketoconazole (NIZOral) 2 % cream As needed    ketorolac tromethamine (KETOROLAC ORAL) TORADOL 10mg 1 every 6 hours prn Active    labetalol (NORMODYNE) 100 mg, oral, 2 times daily    Levemir FlexTouch U100 Insulin 70 Units, subcutaneous, Every morning, Take as directed per insulin instructions.    meclizine (ANTIVERT) 25 mg, oral, As needed    mirabegron (Myrbetriq) 50 mg tablet extended release 24 hr 24 hr tablet oral    pantoprazole (ProtoNix) 40 mg EC tablet oral    polyethylene glycol (Glycolax, Miralax) 1 gram packet oral    sennosides-docusate sodium (Linda-Colace) 8.6-50 mg tablet oral    tamsulosin (FLOMAX) 0.4 mg, oral, Daily    traMADol (ULTRAM) 25 mg, oral, As needed       Physical Exam:  General: Patient is in no acute distress.  HEENT: atraumatic normocephalic.  Neck: is supple jugular venous pressure within normal limits no thyromegaly.  Cardiovascular regular rate and rhythm normal heart sounds no murmurs rubs or gallops.  Lungs: clear to auscultation bilaterally.  Abdomen: is soft nontender.  Extremities warm to touch no edema.  Neurologic examination: patient is awake alert confused.    Psychiatric examination: patient for insight  Pulses 2+ intact bilaterally     Assessment/Plan   #1 weakness and syncope.  Patient admitted to the hospital as workup for weakness for several days and syncope .  Patient is not a great historian.  Echocardiogram showed normal ejection fraction no major valve abnormalities.  Telemetry showing episodes of bradycardia possible complete heart block at least during the episode of confusion and syncope that he had in the hospital.  EKG showed possible A-V dissociation with nonspecific interventricular conduction delay.  I will consult EP for input.  Troponin within normal limit.  TSH within normal limits.  Patient at home  on labetalol which she is currently on hold.  Magnesium and electrolytes stable.  Patient has history of chronic kidney disease.  Await EP input.    2.  Hypertension.  Patient currently hypertensive.  Labetalol on hold.  On losartan.  If he remains hypertensive we will add amlodipine.  Undergoing MRI of the brain to rule out CVA.  Await result for further recommendation.    3.  History of stroke/TIA cardiac aspirin clopidogrel.  Management by neurology.  Recommend high intensity statin.    4.  Hyperlipidemia continue high intensity statin.  Outpatient monitoring of lipid panel.      Peripheral IV 01/18/25 20 G Left;Anterior Forearm (Active)   Site Assessment Clean;Dry;Intact 01/21/25 0826   Dressing Status Clean;Dry 01/21/25 0826   Number of days: 3       Peripheral IV 01/18/25 22 G Right Forearm (Active)   Site Assessment Clean;Dry;Intact 01/21/25 0826   Dressing Status Clean;Dry 01/21/25 0826   Number of days: 3       Code Status:  No Order          Camila Olivarez MD

## 2025-01-21 NOTE — PROGRESS NOTES
Bushra Mclean is a 69 y.o. male on day 2 of admission presenting with Generalized weakness.      Subjective   Pt states he feels better. No overnight events.   Denies any focal neuro- deficits.    Objective     Last Recorded Vitals  /61 (BP Location: Left arm, Patient Position: Lying)   Pulse 65   Temp 35.7 °C (96.3 °F) (Temporal)   Resp 13   Wt 116 kg (255 lb 8.2 oz)   SpO2 100%   Intake/Output last 3 Shifts:    Intake/Output Summary (Last 24 hours) at 1/21/2025 1048  Last data filed at 1/21/2025 0656  Gross per 24 hour   Intake 338.75 ml   Output 550 ml   Net -211.25 ml       Admission Weight  Weight: 120 kg (264 lb) (01/18/25 2001)    Daily Weight  01/21/25 : 116 kg (255 lb 8.2 oz)    Image Results  Electrocardiogram, 12-lead PRN ACS symptoms  Marked sinus bradycardia with AV dissociation and Junctional bradycardia with Sinus/atrial capture with Fusion complexes  Left axis deviation  T wave abnormality, consider lateral ischemia  Abnormal ECG  When compared with ECG of 18-JAN-2025 19:58, (unconfirmed)  Significant changes have occurred  Confirmed by Camila Olivarez ([id number]) on 1/21/2025 10:08:20 AM  Transthoracic Echo (TTE) [address], [place] [address]              Phone [phone]    TRANSTHORACIC ECHOCARDIOGRAM REPORT    Patient Name:       BUSHRA MCLEAN          Reading Physician:    [id number] Camila Olivarez MD  Study Date:         1/21/2025            Ordering Provider:    [id number] WERNER JEWELL  MRN/PID:            [id number]             Fellow:  Accession#:         [id number]         Nurse:  Date of Birth/Age:  1955 / 69 years  Sonographer:          Darya Womack RD  Gender Assigned at                      Additional Staff:  Birth:  Height:              195.58 cm            Admit Date:  Weight:             117.94 kg            Admission Status:     Inpatient -                                                                 Routine  BSA / BMI:          2.50 m2 / 30.83      Department Location:  [facility]                      kg/m2  Blood Pressure: 162 /79 mmHg    Study Type:    TRANSTHORACIC ECHO (TTE) COMPLETE  Diagnosis/ICD: Bradycardia, unspecified-R00.1; Abnormal electrocardiogram [ECG]                 [EKG]-R94.31  Indication:    BRADYCARDIA, ABN EKG  CPT Codes:     Echo Complete w Full Doppler-[id number]    Patient History:  Diabetes:          Yes  Pertinent History: HTN, Hyperlipidemia, CAD, CVA, PVD and H/O PFO repair,                     Bradycardia, ABN EKG.    Study Detail: The following Echo studies were performed: 2D, M-Mode, Doppler and                color flow.       PHYSICIAN INTERPRETATION:  Left Ventricle: The left ventricular systolic function is normal, with a visually estimated ejection fraction of 55-60%. There are no regional wall motion abnormalities. The left ventricular cavity size is normal. There is normal septal and normal posterior left ventricular wall thickness. There is left ventricular concentric remodeling. Spectral Doppler shows a Grade I (impaired relaxation pattern) of left ventricular diastolic filling with normal left atrial filling pressure.  Left Atrium: The left atrium is mildly dilated.  Right Ventricle: The right ventricle is normal in size. There is normal right ventricular global systolic function.  Right Atrium: The right atrium is normal in size.  Aortic Valve: The aortic valve is trileaflet. There is no evidence of aortic valve regurgitation. The peak instantaneous gradient of the aortic valve is 5 mmHg.  Mitral Valve: The mitral valve is normal in structure. There is trace mitral valve regurgitation.  Tricuspid Valve: The tricuspid valve is structurally normal. There is trace tricuspid regurgitation. The right  ventricular systolic pressure is unable to be estimated.  Pulmonic Valve: The pulmonic valve is structurally normal. There is physiologic pulmonic valve regurgitation.  Pericardium: No pericardial effusion noted.  Aorta: The aortic root is normal.       CONCLUSIONS:   1. Poorly visualized anatomical structures due to suboptimal image quality.   2. The left ventricular systolic function is normal, with a visually estimated ejection fraction of 55-60%.   3. Spectral Doppler shows a Grade I (impaired relaxation pattern) of left ventricular diastolic filling with normal left atrial filling pressure.   4. There is normal right ventricular global systolic function.   5. Trace mitral valve regurgitation.   6. Trace tricuspid regurgitation is visualized.    QUANTITATIVE DATA SUMMARY:     2D MEASUREMENTS:           Normal Ranges:  LAs:             2.90 cm   (2.7-4.0cm)  IVSd:            0.94 cm   (0.6-1.1cm)  LVPWd:           0.97 cm   (0.6-1.1cm)  LVIDd:           3.49 cm   (3.9-5.9cm)  LVIDs:           2.60 cm  LV Mass Index:   38.7 g/m2  LV % FS          25.5 %       LA VOLUME:                    Normal Ranges:  LA Vol A4C:        63.8 ml    (22+/-6mL/m2)  LA Vol A2C:        70.4 ml  LA Vol BP:         74.6 ml  LA Vol Index A4C:  25.5ml/m2  LA Vol Index A2C:  28.1 ml/m2  LA Vol Index BP:   29.8 ml/m2  LA Area A4C:       18.9 cm2  LA Area A2C:       22.1 cm2  LA Major Axis A4C: 4.8 cm  LA Major Axis A2C: 5.9 cm  LA Volume Index:   27.9 ml/m2  LA Vol A4C:        57.9 ml  LA Vol A2C:        68.2 ml  LA Vol Index BSA:  25.2 ml/m2       RA VOLUME BY A/L METHOD:          Normal Ranges:  RA Area A4C:             12.5 cm2       M-MODE MEASUREMENTS:         Normal Ranges:  Ao Root:             3.20 cm (2.0-3.7cm)       AORTA MEASUREMENTS:         Normal Ranges:  Asc Ao, d:          2.90 cm (2.1-3.4cm)       LV SYSTOLIC FUNCTION BY 2D PLANIMETRY (MOD):                       Normal Ranges:  EF-A4C View:    58 % (>=55%)  EF-A2C View:     52 %  EF-Biplane:     57 %  EF-Visual:      58 %  LV EF Reported: 58 %       LV DIASTOLIC FUNCTION:           Normal Ranges:  MV Peak E:             0.54 m/s  (0.7-1.2 m/s)  MV Peak A:             0.96 m/s  (0.42-0.7 m/s)  E/A Ratio:             0.57      (1.0-2.2)  MV e'                  0.047 m/s (>8.0)  MV lateral e'          0.06 m/s  MV medial e'           0.03 m/s  E/e' Ratio:            11.48     (<8.0)       MITRAL VALVE:          Normal Ranges:  MV DT:        263 msec (150-240msec)       AORTIC VALVE:           Normal Ranges:  AoV Vmax:      1.07 m/s (<=1.7m/s)  AoV Peak P.6 mmHg (<20mmHg)  LVOT Max Jarrett:  0.91 m/s (<=1.1m/s)  LVOT Diameter: 2.30 cm  (1.8-2.4cm)  AoV Area,Vmax: 3.53 cm2 (2.5-4.5cm2)       RIGHT VENTRICLE:  RV Basal 3.50 cm  RV Mid   1.34 cm  RV Major 7.5 cm  TAPSE:   17.8 mm  RV s'    0.13 m/s       TRICUSPID VALVE/RVSP:          Normal Ranges:  Peak TR Velocity:     2.02 m/s  RV Syst Pressure:     19 mmHg  (< 30mmHg)       [id number] Camila Olivarez MD  Electronically signed on 2025 at 9:32:17 AM       ** Final **  US thyroid  Narrative: Interpreted By:  Jose Sanders,   STUDY:  US THYROID; 2025 7:35 pm      INDICATION:  Signs/Symptoms:Multinodular Thyroid on CT.      COMPARISON:  None.      ACCESSION NUMBER(S):  [id number]      ORDERING CLINICIAN:  SHAKIR WHALEY      TECHNIQUE:  Grayscale and color Doppler imaging of the thyroid gland was  performed.      FINDINGS:  There is a diffusely heterogeneous appearance of the echo pattern  throughout the right and left lobes of the thyroid gland with no  normal-appearing thyroid tissue identified. Multiple small cysts are  identified bilaterally.      Right lobe of the thyroid: 5.5 cm x 2.8 cm x 2.5 cm..      Left lobe of the thyroid: 4.8 cm x 2.3 cm x 1.9 cm. There are 2  similar-appearing small nodules within left lobe of the thyroid gland  midpole, wider than tall, smooth well-defined margins, isoechoic in  appearance without  microcalcifications measuring 7 mm and 5 mm. These  are TI-RADS category 3..      Thyroid isthmus: 1.2 cm..      Impression: Diffusely heterogeneous appearance of the thyroid tissue bilaterally  with no normal thyroid tissue identified. There are 2 nodules on the  left each with a TI-RADS category 3. No specific follow-up is  recommended based on TI-RADS category 3 and less than 1 cm in size.      Signed by: Jose Sanders 1/21/2025 8:04 AM  Dictation workstation:   [id number]      Physical Exam  Location: 422  Pt is eating breakfast,observed by Speech therapy.  Pt is NAD.  Cooperative with exam.  In no distress at rest.  A, Ox3.  Face is symmetrical.  Skin - no lesions.  Lungs: clear to auscultations B/L. No wheezes, rales, rhonchi.  Heart: regular S1S2.  Abdomen: soft, NT, ND. BS positive.  Extr.: no edema, cords, cyanosis.  Moves all extr.   Relevant Results    Monitor strips reviewed:  Frequent episodes of skipped QRS complexes, looks like Mobitz II.    Results for orders placed or performed during the hospital encounter of 01/18/25 (from the past 24 hours)   POCT GLUCOSE   Result Value Ref Range    POCT Glucose 176 (H) 74 - 99 mg/dL   POCT GLUCOSE   Result Value Ref Range    POCT Glucose 184 (H) 74 - 99 mg/dL   Troponin I, High Sensitivity   Result Value Ref Range    Troponin I, High Sensitivity 14 0 - 20 ng/L   Electrocardiogram, 12-lead PRN ACS symptoms   Result Value Ref Range    Ventricular Rate 40 BPM    Atrial Rate 44 BPM    QRS Duration 114 ms    QT Interval 494 ms    QTC Calculation(Bazett) 402 ms    P Axis 67 degrees    R Axis -40 degrees    T Axis -33 degrees    QRS Count 6 beats    Q Onset 209 ms    T Offset 456 ms    QTC Fredericia 431 ms   POCT GLUCOSE   Result Value Ref Range    POCT Glucose 121 (H) 74 - 99 mg/dL   POCT GLUCOSE   Result Value Ref Range    POCT Glucose 117 (H) 74 - 99 mg/dL   Creatine Kinase   Result Value Ref Range    Creatine Kinase 735 (H) 0 - 325 U/L   Basic Metabolic Panel    Result Value Ref Range    Glucose 88 74 - 99 mg/dL    Sodium 137 136 - 145 mmol/L    Potassium 4.3 3.5 - 5.3 mmol/L    Chloride 107 98 - 107 mmol/L    Bicarbonate 25 21 - 32 mmol/L    Anion Gap 9 (L) 10 - 20 mmol/L    Urea Nitrogen 20 6 - 23 mg/dL    Creatinine 1.85 (H) 0.50 - 1.30 mg/dL    eGFR 38 (L) >60 mL/min/1.73m*2    Calcium 8.1 (L) 8.6 - 10.3 mg/dL   POCT GLUCOSE   Result Value Ref Range    POCT Glucose 85 74 - 99 mg/dL   Transthoracic Echo (TTE) Complete   Result Value Ref Range    AV pk hetal 1.07 m/s    LVOT diam 2.30 cm    MV E/A ratio 0.57     Tricuspid annular plane systolic excursion 1.8 cm    LV Biplane EF 57 %    LA vol index A/L 29.8 ml/m2    LV EF 58 %    RV free wall pk S' 12.70 cm/s    RVSP 19.3 mmHg    LVIDd 3.49 cm    AV pk grad 5 mmHg    Aortic Valve Area by Continuity of Peak Velocity 3.53 cm2    LV A4C EF 57.5       Electrocardiogram, 12-lead PRN ACS symptoms    Result Date: 1/21/2025  Marked sinus bradycardia with AV dissociation and Junctional bradycardia with Sinus/atrial capture with Fusion complexes Left axis deviation T wave abnormality, consider lateral ischemia Abnormal ECG When compared with ECG of 18-JAN-2025 19:58, (unconfirmed) Significant changes have occurred Confirmed by Camila Olivarez ([id number]) on 1/21/2025 10:08:20 AM    Transthoracic Echo (TTE) Complete    Result Date: 1/21/2025            [place] [address]             Phone [phone] TRANSTHORACIC ECHOCARDIOGRAM REPORT Patient Name:       BUSHRA Hensley Physician:    [id number] Camila Olivarez MD Study Date:         1/21/2025            Ordering Provider:    [id number] WERNER JEWELL MRN/PID:            [id number]             Fellow: Accession#:         [id number]         Nurse: Date of Birth/Age:  1955 / 69 years  Sonographer:          Darya  Shanta                                                                [facility] Gender Assigned at  M                    Additional Staff: Birth: Height:             195.58 cm            Admit Date: Weight:             117.94 kg            Admission Status:     Inpatient -                                                                Routine BSA / BMI:          2.50 m2 / 30.83      Department Location:  [facility]                     kg/m2 Blood Pressure: 162 /79 mmHg Study Type:    TRANSTHORACIC ECHO (TTE) COMPLETE Diagnosis/ICD: Bradycardia, unspecified-R00.1; Abnormal electrocardiogram [ECG]                [EKG]-R94.31 Indication:    BRADYCARDIA, ABN EKG CPT Codes:     Echo Complete w Full Doppler-[id number] Patient History: Diabetes:          Yes Pertinent History: HTN, Hyperlipidemia, CAD, CVA, PVD and H/O PFO repair,                    Bradycardia, ABN EKG. Study Detail: The following Echo studies were performed: 2D, M-Mode, Doppler and               color flow.  PHYSICIAN INTERPRETATION: Left Ventricle: The left ventricular systolic function is normal, with a visually estimated ejection fraction of 55-60%. There are no regional wall motion abnormalities. The left ventricular cavity size is normal. There is normal septal and normal posterior left ventricular wall thickness. There is left ventricular concentric remodeling. Spectral Doppler shows a Grade I (impaired relaxation pattern) of left ventricular diastolic filling with normal left atrial filling pressure. Left Atrium: The left atrium is mildly dilated. Right Ventricle: The right ventricle is normal in size. There is normal right ventricular global systolic function. Right Atrium: The right atrium is normal in size. Aortic Valve: The aortic valve is trileaflet. There is no evidence of aortic valve regurgitation. The peak instantaneous gradient of the aortic valve is 5 mmHg. Mitral Valve: The mitral valve is normal in structure. There is trace mitral valve  regurgitation. Tricuspid Valve: The tricuspid valve is structurally normal. There is trace tricuspid regurgitation. The right ventricular systolic pressure is unable to be estimated. Pulmonic Valve: The pulmonic valve is structurally normal. There is physiologic pulmonic valve regurgitation. Pericardium: No pericardial effusion noted. Aorta: The aortic root is normal.  CONCLUSIONS:  1. Poorly visualized anatomical structures due to suboptimal image quality.  2. The left ventricular systolic function is normal, with a visually estimated ejection fraction of 55-60%.  3. Spectral Doppler shows a Grade I (impaired relaxation pattern) of left ventricular diastolic filling with normal left atrial filling pressure.  4. There is normal right ventricular global systolic function.  5. Trace mitral valve regurgitation.  6. Trace tricuspid regurgitation is visualized. QUANTITATIVE DATA SUMMARY:  2D MEASUREMENTS:           Normal Ranges: LAs:             2.90 cm   (2.7-4.0cm) IVSd:            0.94 cm   (0.6-1.1cm) LVPWd:           0.97 cm   (0.6-1.1cm) LVIDd:           3.49 cm   (3.9-5.9cm) LVIDs:           2.60 cm LV Mass Index:   38.7 g/m2 LV % FS          25.5 %  LA VOLUME:                    Normal Ranges: LA Vol A4C:        63.8 ml    (22+/-6mL/m2) LA Vol A2C:        70.4 ml LA Vol BP:         74.6 ml LA Vol Index A4C:  25.5ml/m2 LA Vol Index A2C:  28.1 ml/m2 LA Vol Index BP:   29.8 ml/m2 LA Area A4C:       18.9 cm2 LA Area A2C:       22.1 cm2 LA Major Axis A4C: 4.8 cm LA Major Axis A2C: 5.9 cm LA Volume Index:   27.9 ml/m2 LA Vol A4C:        57.9 ml LA Vol A2C:        68.2 ml LA Vol Index BSA:  25.2 ml/m2  RA VOLUME BY A/L METHOD:          Normal Ranges: RA Area A4C:             12.5 cm2  M-MODE MEASUREMENTS:         Normal Ranges: Ao Root:             3.20 cm (2.0-3.7cm)  AORTA MEASUREMENTS:         Normal Ranges: Asc Ao, d:          2.90 cm (2.1-3.4cm)  LV SYSTOLIC FUNCTION BY 2D PLANIMETRY (MOD):                       Normal Ranges: EF-A4C View:    58 % (>=55%) EF-A2C View:    52 % EF-Biplane:     57 % EF-Visual:      58 % LV EF Reported: 58 %  LV DIASTOLIC FUNCTION:           Normal Ranges: MV Peak E:             0.54 m/s  (0.7-1.2 m/s) MV Peak A:             0.96 m/s  (0.42-0.7 m/s) E/A Ratio:             0.57      (1.0-2.2) MV e'                  0.047 m/s (>8.0) MV lateral e'          0.06 m/s MV medial e'           0.03 m/s E/e' Ratio:            11.48     (<8.0)  MITRAL VALVE:          Normal Ranges: MV DT:        263 msec (150-240msec)  AORTIC VALVE:           Normal Ranges: AoV Vmax:      1.07 m/s (<=1.7m/s) AoV Peak P.6 mmHg (<20mmHg) LVOT Max Jarrett:  0.91 m/s (<=1.1m/s) LVOT Diameter: 2.30 cm  (1.8-2.4cm) AoV Area,Vmax: 3.53 cm2 (2.5-4.5cm2)  RIGHT VENTRICLE: RV Basal 3.50 cm RV Mid   1.34 cm RV Major 7.5 cm TAPSE:   17.8 mm RV s'    0.13 m/s  TRICUSPID VALVE/RVSP:          Normal Ranges: Peak TR Velocity:     2.02 m/s RV Syst Pressure:     19 mmHg  (< 30mmHg)  [id number] Camila Olivarez MD Electronically signed on 2025 at 9:32:17 AM  ** Final **     US thyroid    Result Date: 2025  Interpreted By:  Jose Sanders, STUDY: US THYROID; 2025 7:35 pm   INDICATION: Signs/Symptoms:Multinodular Thyroid on CT.   COMPARISON: None.   ACCESSION NUMBER(S): [id number]   ORDERING CLINICIAN: SHAKIR WHALEY   TECHNIQUE: Grayscale and color Doppler imaging of the thyroid gland was performed.   FINDINGS: There is a diffusely heterogeneous appearance of the echo pattern throughout the right and left lobes of the thyroid gland with no normal-appearing thyroid tissue identified. Multiple small cysts are identified bilaterally.   Right lobe of the thyroid: 5.5 cm x 2.8 cm x 2.5 cm..   Left lobe of the thyroid: 4.8 cm x 2.3 cm x 1.9 cm. There are 2 similar-appearing small nodules within left lobe of the thyroid gland midpole, wider than tall, smooth well-defined margins, isoechoic in appearance without microcalcifications  measuring 7 mm and 5 mm. These are TI-RADS category 3..   Thyroid isthmus: 1.2 cm..       Diffusely heterogeneous appearance of the thyroid tissue bilaterally with no normal thyroid tissue identified. There are 2 nodules on the left each with a TI-RADS category 3. No specific follow-up is recommended based on TI-RADS category 3 and less than 1 cm in size.   Signed by: Jose Sanders 1/21/2025 8:04 AM Dictation workstation:   [id number]    CT angio head and neck w and wo IV contrast    Result Date: 1/20/2025  Interpreted By:  Mateusz Wright, STUDY: CT ANGIO HEAD AND NECK W AND WO IV CONTRAST;  1/20/2025 5:12 pm   INDICATION: Signs/Symptoms:Brain attack.     COMPARISON: CT head today.   ACCESSION NUMBER(S): [id number]   ORDERING CLINICIAN: SHAKIR WHALEY   TECHNIQUE: 75 ML of Omnipaque 350 was administered intravenously and axial images of the head and neck were acquired.  Coronal, sagittal, and 3-D reconstructions were provided for review.   FINDINGS:   CTA COW: No major vascular occlusion. Abrupt cut off flow related enhancement A2 segment left anterior cerebral artery, suspected thrombosis, with faint distal reconstitution. Moderate short-segment stenosis of the A2 segment right anterior cerebral artery. No aneurysms.  No vascular malformations.   CTA CAROTID: No aortic aneurysm or dissection. No significant stenoses at the origins of the great vessels from the aortic arch. No significant stenoses of the brachycephalic artery or bilateral subclavian arteries.   No significant stenoses bilateral carotid arterial systems. Luminal irregularity of the proximal bilateral cervical internal carotid artery suspected be related to fibrofatty plaque rather than connective tissue disorder.   Severe short-segment stenosis V1 segment right vertebral artery. Moderate short-segment stenosis of the V1 segment of the left vertebral artery.   NONVASCULAR NECK: No soft tissue mass. No adenopathy. Salivary glands are  unremarkable. Multinodular thyroid. Bones intact.         1. No LVO. Abrupt cut off flow related enhancement A2 segment left anterior cerebral artery, suspected thrombosis, with faint distal reconstitution. 2. Severe short-segment stenosis V1 segment right vertebral artery. Moderate short-segment stenosis of the V1 segment of the left vertebral artery. Suspect atherosclerotic involvement though thrombosis not excluded. 3. Multinodular thyroid. Suggest correlation with thyroid ultrasound to assess for possible neoplasm.   MACRO: None   Signed by: Mateusz Wright 1/20/2025 6:41 PM Dictation workstation:   [id number]    CT head wo IV contrast    Result Date: 1/20/2025  Interpreted By:  Maria Guadalupe Barragan, STUDY: CT HEAD WO IV CONTRAST;  1/20/2025 2:48 pm   INDICATION: Signs/Symptoms:altered mental status.  Generalized weakness     COMPARISON: 01/18/2025   ACCESSION NUMBER(S): [id number]   ORDERING CLINICIAN: WERNER JEWELL   TECHNIQUE: Noncontrast axial CT scan of head was performed. Angled reformats in brain and bone windows were generated. The images were reviewed in bone, brain, blood and soft tissue windows.   FINDINGS: CSF Spaces: There is ventricular enlargement with proportionate deepening and widening of the sulci and the sylvian fissures.. There is no extraaxial fluid collection.   Parenchyma: There are areas of diminished density seen within the white matter of each cerebral hemisphere secondary to chronic microvascular ischemic disease. There is an old lacunar infarct within the right basal ganglia extending into the corona radiata with old lacunar infarct of the left corona radiata noted. The grey-white differentiation is intact. There is no mass effect or midline shift. There is no intracranial hemorrhage.   Calvarium: The calvarium is unremarkable.   Paranasal sinuses and mastoids: There is some fluid identified within left sphenoid sinus. The remaining visualized paranasal sinuses are clear.       Atrophy  and chronic microvascular ischemic disease with old lacunar infarcts seen bilaterally within the corona radiata.   No acute intracranial process.   No evidence of intracranial hemorrhage or displaced skull fracture.   MACRO: None     Signed by: Maria Guadalupe Barragan 1/20/2025 3:06 PM Dictation workstation:   [id number]             Assessment/Plan                  Assessment & Plan  Generalized weakness  -reviewing records from 3 years ago, he also has a history of necrotizing myopathy which required immunotherapy   -adding on CK just in case  -TSH normal, cortisol ordered for morning draw  -PT/OT  Cognitive change  -No acute findings on CT head, though there is microvascular ischemic damage and old infarcts  -neurology consulted for evaluation  Type 1 diabetes mellitus  -Reports 45u long acting at night and 16u prandial   -start 30u lantus, 8u prandial, and low dose SSI, adjust as needed      01/20/25:  Rhabdomyolysis due to Statin? CK level is improving.  Cortisol level WNL    Acute-on-chronic renal failure, improving.  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  DM1, cont. Insulin    Deconditioning - PT/OT. Agreeable to rehab.    1/21/25:    Syncopal episode yesterday with bradycardia on the monitor (mobitz II vs complete AV block), improved with Atropine. Last dose on Labetalol 100 mg PO was yesterday at 8:35am.  Cardiology consulted, they will discuss with EP about a transfer to [facility] for a pacemaker placement.    Transient stroke-like picture with transient Rt-sided hemiplegia. CTA of the brain demonstrated  LMCA stenosis, likely caused symptoms in the setting of bradycardia. Continue ASA/Plavix. MRI of the brain today Acute/subacute stroke, no hemorrhagic transformation.    Pt CAN NOT have statins due to rhabdomyolysis.    Rhabdomyolysis, resolving with hydration.    ARF, improved. Creatinine is worse today, likely due to bradycardia(low perfusion) and IV dye. Monitor.    HTN. Will keep BP on  a higher  side to avoid neuro- compromise. Stop Labetalol (last dose yest. At 8:35am)       Discussed with Pt's sister.       Kamila Malin MD

## 2025-01-21 NOTE — CARE PLAN
The patient's goals for the shift include  rest    The clinical goals for the shift include monitor vitals      Problem: Safety - Adult  Goal: Free from fall injury  Outcome: Progressing

## 2025-01-21 NOTE — PROGRESS NOTES
[facility] by [facility]'s [facility].   [facility] to [facility] to see if he is service connected, as, he needs rehab.   He has a new insurance call [employer].   Have requested a copy of the card from wife,but, she says [facility] should cover rehab, why do I need it?     Waiting to hear back from [facility].    1358- Call to [facility].  Spoke with Teja [phone]  Patient is NOT Service connected.   Ivanna is his  [phone].    [facility]  If patient wants rehab up here in [facility] have to go through [employer].    Requested copy from sister, Kassandra Ward.  Information updated on facesheet because wife is .  I removed the wife from facesheet.  Waiting on copy of the card.      Cherrie Harding RN

## 2025-01-22 ENCOUNTER — HOSPITAL ENCOUNTER (INPATIENT)
Facility: HOSPITAL | Age: 70
Discharge: NURSING, REHAB OR LONG-TERM CARE | DRG: 065 | End: 2025-01-22
Attending: INTERNAL MEDICINE | Admitting: INTERNAL MEDICINE
Payer: MEDICARE

## 2025-01-22 ENCOUNTER — HOSPITAL ENCOUNTER (OUTPATIENT)
Facility: HOSPITAL | Age: 70
Setting detail: OUTPATIENT SURGERY
End: 2025-01-22
Attending: INTERNAL MEDICINE | Admitting: INTERNAL MEDICINE
Payer: MEDICARE

## 2025-01-22 ENCOUNTER — TELEPHONE (OUTPATIENT)
Dept: NEPHROLOGY | Facility: HOSPITAL | Age: 70
End: 2025-01-22

## 2025-01-22 VITALS
BODY MASS INDEX: 32 KG/M2 | SYSTOLIC BLOOD PRESSURE: 128 MMHG | OXYGEN SATURATION: 99 % | HEIGHT: 77 IN | TEMPERATURE: 98.2 F | WEIGHT: 271 LBS | RESPIRATION RATE: 14 BRPM | DIASTOLIC BLOOD PRESSURE: 63 MMHG | HEART RATE: 83 BPM

## 2025-01-22 DIAGNOSIS — R41.89 COGNITIVE CHANGE: ICD-10-CM

## 2025-01-22 DIAGNOSIS — R53.1 GENERALIZED WEAKNESS: ICD-10-CM

## 2025-01-22 DIAGNOSIS — I45.9 HB (HEART BLOCK): ICD-10-CM

## 2025-01-22 DIAGNOSIS — E10.69 TYPE 1 DIABETES MELLITUS WITH OTHER SPECIFIED COMPLICATION: ICD-10-CM

## 2025-01-22 DIAGNOSIS — I73.9 PERIPHERAL VASCULAR DISEASE (CMS-HCC): ICD-10-CM

## 2025-01-22 DIAGNOSIS — R00.1 BRADYCARDIA: Primary | ICD-10-CM

## 2025-01-22 DIAGNOSIS — I67.9 CEREBROVASCULAR DISEASE: ICD-10-CM

## 2025-01-22 DIAGNOSIS — R00.1 BRADYCARDIA: ICD-10-CM

## 2025-01-22 DIAGNOSIS — I10 PRIMARY HYPERTENSION: ICD-10-CM

## 2025-01-22 DIAGNOSIS — F68.8 OTHER SPECIFIED DISORDERS OF ADULT PERSONALITY AND BEHAVIOR: ICD-10-CM

## 2025-01-22 LAB
ALBUMIN SERPL BCP-MCNC: 3.3 G/DL (ref 3.4–5)
ALP SERPL-CCNC: 61 U/L (ref 33–136)
ALT SERPL W P-5'-P-CCNC: 12 U/L (ref 10–52)
ANION GAP SERPL CALCULATED.3IONS-SCNC: 9 MMOL/L (ref 10–20)
ANION GAP SERPL CALCULATED.3IONS-SCNC: 9 MMOL/L (ref 10–20)
AST SERPL W P-5'-P-CCNC: 19 U/L (ref 9–39)
BILIRUB SERPL-MCNC: 0.5 MG/DL (ref 0–1.2)
BUN SERPL-MCNC: 25 MG/DL (ref 6–23)
BUN SERPL-MCNC: 27 MG/DL (ref 6–23)
CALCIUM SERPL-MCNC: 7.7 MG/DL (ref 8.6–10.3)
CALCIUM SERPL-MCNC: 7.8 MG/DL (ref 8.6–10.3)
CHLORIDE SERPL-SCNC: 107 MMOL/L (ref 98–107)
CHLORIDE SERPL-SCNC: 108 MMOL/L (ref 98–107)
CK SERPL-CCNC: 570 U/L (ref 0–325)
CO2 SERPL-SCNC: 22 MMOL/L (ref 21–32)
CO2 SERPL-SCNC: 25 MMOL/L (ref 21–32)
CREAT SERPL-MCNC: 1.93 MG/DL (ref 0.5–1.3)
CREAT SERPL-MCNC: 2.04 MG/DL (ref 0.5–1.3)
EGFRCR SERPLBLD CKD-EPI 2021: 34 ML/MIN/1.73M*2
EGFRCR SERPLBLD CKD-EPI 2021: 37 ML/MIN/1.73M*2
ERYTHROCYTE [DISTWIDTH] IN BLOOD BY AUTOMATED COUNT: 14.3 % (ref 11.5–14.5)
GLUCOSE BLD MANUAL STRIP-MCNC: 156 MG/DL (ref 74–99)
GLUCOSE BLD MANUAL STRIP-MCNC: 180 MG/DL (ref 74–99)
GLUCOSE BLD MANUAL STRIP-MCNC: 216 MG/DL (ref 74–99)
GLUCOSE SERPL-MCNC: 155 MG/DL (ref 74–99)
GLUCOSE SERPL-MCNC: 172 MG/DL (ref 74–99)
HCT VFR BLD AUTO: 41.6 % (ref 41–52)
HGB BLD-MCNC: 14 G/DL (ref 13.5–17.5)
MCH RBC QN AUTO: 29.6 PG (ref 26–34)
MCHC RBC AUTO-ENTMCNC: 33.7 G/DL (ref 32–36)
MCV RBC AUTO: 88 FL (ref 80–100)
NRBC BLD-RTO: 0 /100 WBCS (ref 0–0)
PLATELET # BLD AUTO: 166 X10*3/UL (ref 150–450)
POTASSIUM SERPL-SCNC: 4.4 MMOL/L (ref 3.5–5.3)
POTASSIUM SERPL-SCNC: 4.5 MMOL/L (ref 3.5–5.3)
PROT SERPL-MCNC: 7.1 G/DL (ref 6.4–8.2)
RBC # BLD AUTO: 4.73 X10*6/UL (ref 4.5–5.9)
SODIUM SERPL-SCNC: 134 MMOL/L (ref 136–145)
SODIUM SERPL-SCNC: 137 MMOL/L (ref 136–145)
TSH SERPL-ACNC: 2.01 MIU/L (ref 0.44–3.98)
WBC # BLD AUTO: 11.4 X10*3/UL (ref 4.4–11.3)

## 2025-01-22 PROCEDURE — 2500000004 HC RX 250 GENERAL PHARMACY W/ HCPCS (ALT 636 FOR OP/ED): Performed by: INTERNAL MEDICINE

## 2025-01-22 PROCEDURE — 85027 COMPLETE CBC AUTOMATED: CPT | Performed by: INTERNAL MEDICINE

## 2025-01-22 PROCEDURE — 36415 COLL VENOUS BLD VENIPUNCTURE: CPT | Performed by: INTERNAL MEDICINE

## 2025-01-22 PROCEDURE — 99233 SBSQ HOSP IP/OBS HIGH 50: CPT | Performed by: INTERNAL MEDICINE

## 2025-01-22 PROCEDURE — 2060000001 HC INTERMEDIATE ICU ROOM DAILY

## 2025-01-22 PROCEDURE — 2500000001 HC RX 250 WO HCPCS SELF ADMINISTERED DRUGS (ALT 637 FOR MEDICARE OP): Performed by: STUDENT IN AN ORGANIZED HEALTH CARE EDUCATION/TRAINING PROGRAM

## 2025-01-22 PROCEDURE — 2500000001 HC RX 250 WO HCPCS SELF ADMINISTERED DRUGS (ALT 637 FOR MEDICARE OP): Performed by: INTERNAL MEDICINE

## 2025-01-22 PROCEDURE — 97110 THERAPEUTIC EXERCISES: CPT | Mod: GP,CQ

## 2025-01-22 PROCEDURE — 82947 ASSAY GLUCOSE BLOOD QUANT: CPT

## 2025-01-22 PROCEDURE — 2500000002 HC RX 250 W HCPCS SELF ADMINISTERED DRUGS (ALT 637 FOR MEDICARE OP, ALT 636 FOR OP/ED): Performed by: STUDENT IN AN ORGANIZED HEALTH CARE EDUCATION/TRAINING PROGRAM

## 2025-01-22 PROCEDURE — 80048 BASIC METABOLIC PNL TOTAL CA: CPT | Performed by: STUDENT IN AN ORGANIZED HEALTH CARE EDUCATION/TRAINING PROGRAM

## 2025-01-22 PROCEDURE — 97535 SELF CARE MNGMENT TRAINING: CPT | Mod: GO,CO

## 2025-01-22 PROCEDURE — 82550 ASSAY OF CK (CPK): CPT | Performed by: INTERNAL MEDICINE

## 2025-01-22 PROCEDURE — 2500000002 HC RX 250 W HCPCS SELF ADMINISTERED DRUGS (ALT 637 FOR MEDICARE OP, ALT 636 FOR OP/ED): Performed by: INTERNAL MEDICINE

## 2025-01-22 PROCEDURE — 99223 1ST HOSP IP/OBS HIGH 75: CPT | Performed by: INTERNAL MEDICINE

## 2025-01-22 PROCEDURE — 97530 THERAPEUTIC ACTIVITIES: CPT | Mod: GP,CQ

## 2025-01-22 PROCEDURE — 80053 COMPREHEN METABOLIC PANEL: CPT | Performed by: INTERNAL MEDICINE

## 2025-01-22 PROCEDURE — 36415 COLL VENOUS BLD VENIPUNCTURE: CPT | Performed by: STUDENT IN AN ORGANIZED HEALTH CARE EDUCATION/TRAINING PROGRAM

## 2025-01-22 PROCEDURE — 83036 HEMOGLOBIN GLYCOSYLATED A1C: CPT | Mod: WESLAB | Performed by: INTERNAL MEDICINE

## 2025-01-22 PROCEDURE — 99239 HOSP IP/OBS DSCHRG MGMT >30: CPT | Performed by: INTERNAL MEDICINE

## 2025-01-22 PROCEDURE — 84443 ASSAY THYROID STIM HORMONE: CPT | Performed by: INTERNAL MEDICINE

## 2025-01-22 RX ORDER — TRAMADOL HYDROCHLORIDE 50 MG/1
50 TABLET ORAL EVERY 8 HOURS PRN
Status: DISCONTINUED | OUTPATIENT
Start: 2025-01-22 | End: 2025-01-31 | Stop reason: HOSPADM

## 2025-01-22 RX ORDER — TAMSULOSIN HYDROCHLORIDE 0.4 MG/1
0.4 CAPSULE ORAL DAILY
Status: DISCONTINUED | OUTPATIENT
Start: 2025-01-23 | End: 2025-01-31 | Stop reason: HOSPADM

## 2025-01-22 RX ORDER — NAPROXEN SODIUM 220 MG/1
81 TABLET, FILM COATED ORAL DAILY
Status: DISCONTINUED | OUTPATIENT
Start: 2025-01-23 | End: 2025-01-31 | Stop reason: HOSPADM

## 2025-01-22 RX ORDER — INSULIN GLARGINE 100 [IU]/ML
35 INJECTION, SOLUTION SUBCUTANEOUS EVERY 24 HOURS
Status: DISCONTINUED | OUTPATIENT
Start: 2025-01-22 | End: 2025-01-23

## 2025-01-22 RX ORDER — POLYETHYLENE GLYCOL 3350 17 G/17G
17 POWDER, FOR SOLUTION ORAL DAILY
Status: DISCONTINUED | OUTPATIENT
Start: 2025-01-22 | End: 2025-01-31 | Stop reason: HOSPADM

## 2025-01-22 RX ORDER — TALC
3 POWDER (GRAM) TOPICAL NIGHTLY PRN
Status: DISCONTINUED | OUTPATIENT
Start: 2025-01-22 | End: 2025-01-31 | Stop reason: HOSPADM

## 2025-01-22 RX ORDER — ACETAMINOPHEN 160 MG/5ML
650 SOLUTION ORAL EVERY 4 HOURS PRN
Status: DISCONTINUED | OUTPATIENT
Start: 2025-01-22 | End: 2025-01-31 | Stop reason: HOSPADM

## 2025-01-22 RX ORDER — ACETAMINOPHEN 325 MG/1
650 TABLET ORAL EVERY 4 HOURS PRN
Status: DISCONTINUED | OUTPATIENT
Start: 2025-01-22 | End: 2025-01-31 | Stop reason: HOSPADM

## 2025-01-22 RX ORDER — ONDANSETRON 4 MG/1
4 TABLET, FILM COATED ORAL EVERY 8 HOURS PRN
Status: DISCONTINUED | OUTPATIENT
Start: 2025-01-22 | End: 2025-01-31 | Stop reason: HOSPADM

## 2025-01-22 RX ORDER — FINASTERIDE 5 MG/1
5 TABLET, FILM COATED ORAL DAILY
Status: DISCONTINUED | OUTPATIENT
Start: 2025-01-23 | End: 2025-01-31 | Stop reason: HOSPADM

## 2025-01-22 RX ORDER — ACETAMINOPHEN 650 MG/1
650 SUPPOSITORY RECTAL EVERY 4 HOURS PRN
Status: DISCONTINUED | OUTPATIENT
Start: 2025-01-22 | End: 2025-01-31 | Stop reason: HOSPADM

## 2025-01-22 RX ORDER — AMOXICILLIN 250 MG
1 CAPSULE ORAL NIGHTLY PRN
Status: DISCONTINUED | OUTPATIENT
Start: 2025-01-22 | End: 2025-01-31 | Stop reason: HOSPADM

## 2025-01-22 RX ORDER — SODIUM CHLORIDE 9 MG/ML
100 INJECTION, SOLUTION INTRAVENOUS ONCE
Status: COMPLETED | OUTPATIENT
Start: 2025-01-22 | End: 2025-01-22

## 2025-01-22 RX ORDER — ERGOCALCIFEROL 1.25 MG/1
1250 CAPSULE ORAL
Status: DISCONTINUED | OUTPATIENT
Start: 2025-01-26 | End: 2025-01-31 | Stop reason: HOSPADM

## 2025-01-22 RX ORDER — HEPARIN SODIUM 5000 [USP'U]/ML
5000 INJECTION, SOLUTION INTRAVENOUS; SUBCUTANEOUS EVERY 12 HOURS
Status: DISCONTINUED | OUTPATIENT
Start: 2025-01-22 | End: 2025-01-31 | Stop reason: HOSPADM

## 2025-01-22 RX ORDER — FAMOTIDINE 10 MG/ML
20 INJECTION INTRAVENOUS DAILY
Status: DISCONTINUED | OUTPATIENT
Start: 2025-01-22 | End: 2025-01-31 | Stop reason: HOSPADM

## 2025-01-22 RX ORDER — FAMOTIDINE 20 MG/1
20 TABLET, FILM COATED ORAL DAILY
Status: DISCONTINUED | OUTPATIENT
Start: 2025-01-22 | End: 2025-01-31 | Stop reason: HOSPADM

## 2025-01-22 RX ORDER — INSULIN LISPRO 100 [IU]/ML
0-10 INJECTION, SOLUTION INTRAVENOUS; SUBCUTANEOUS
Status: DISCONTINUED | OUTPATIENT
Start: 2025-01-22 | End: 2025-01-31 | Stop reason: HOSPADM

## 2025-01-22 RX ORDER — DEXTROSE 50 % IN WATER (D50W) INTRAVENOUS SYRINGE
12.5
Status: DISCONTINUED | OUTPATIENT
Start: 2025-01-22 | End: 2025-01-31 | Stop reason: HOSPADM

## 2025-01-22 RX ORDER — AMLODIPINE BESYLATE 2.5 MG/1
2.5 TABLET ORAL DAILY
Status: DISCONTINUED | OUTPATIENT
Start: 2025-01-22 | End: 2025-01-31 | Stop reason: HOSPADM

## 2025-01-22 RX ORDER — CLOPIDOGREL BISULFATE 75 MG/1
75 TABLET ORAL DAILY
Status: DISCONTINUED | OUTPATIENT
Start: 2025-01-23 | End: 2025-01-31 | Stop reason: HOSPADM

## 2025-01-22 RX ORDER — ONDANSETRON HYDROCHLORIDE 2 MG/ML
4 INJECTION, SOLUTION INTRAVENOUS EVERY 8 HOURS PRN
Status: DISCONTINUED | OUTPATIENT
Start: 2025-01-22 | End: 2025-01-31 | Stop reason: HOSPADM

## 2025-01-22 RX ORDER — DEXTROSE 50 % IN WATER (D50W) INTRAVENOUS SYRINGE
25
Status: DISCONTINUED | OUTPATIENT
Start: 2025-01-22 | End: 2025-01-31 | Stop reason: HOSPADM

## 2025-01-22 RX ADMIN — SODIUM CHLORIDE 100 ML/HR: 900 INJECTION, SOLUTION INTRAVENOUS at 18:18

## 2025-01-22 RX ADMIN — CLOPIDOGREL BISULFATE 75 MG: 75 TABLET ORAL at 08:44

## 2025-01-22 RX ADMIN — FINASTERIDE 5 MG: 5 TABLET, FILM COATED ORAL at 08:45

## 2025-01-22 RX ADMIN — AMLODIPINE BESYLATE 2.5 MG: 2.5 TABLET ORAL at 18:09

## 2025-01-22 RX ADMIN — INSULIN GLARGINE 35 UNITS: 100 INJECTION, SOLUTION SUBCUTANEOUS at 22:00

## 2025-01-22 RX ADMIN — ASPIRIN 81 MG CHEWABLE TABLET 81 MG: 81 TABLET CHEWABLE at 08:44

## 2025-01-22 RX ADMIN — TAMSULOSIN HYDROCHLORIDE 0.4 MG: 0.4 CAPSULE ORAL at 08:45

## 2025-01-22 RX ADMIN — POLYETHYLENE GLYCOL 3350 17 G: 17 POWDER, FOR SOLUTION ORAL at 18:09

## 2025-01-22 RX ADMIN — INSULIN LISPRO 2 UNITS: 100 INJECTION, SOLUTION INTRAVENOUS; SUBCUTANEOUS at 18:13

## 2025-01-22 RX ADMIN — INSULIN LISPRO 2 UNITS: 100 INJECTION, SOLUTION INTRAVENOUS; SUBCUTANEOUS at 11:22

## 2025-01-22 RX ADMIN — DOCUSATE SODIUM 100 MG: 100 CAPSULE, LIQUID FILLED ORAL at 08:44

## 2025-01-22 RX ADMIN — LOSARTAN POTASSIUM 100 MG: 100 TABLET, FILM COATED ORAL at 08:45

## 2025-01-22 RX ADMIN — INSULIN LISPRO 8 UNITS: 100 INJECTION, SOLUTION INTRAVENOUS; SUBCUTANEOUS at 11:22

## 2025-01-22 RX ADMIN — INSULIN LISPRO 1 UNITS: 100 INJECTION, SOLUTION INTRAVENOUS; SUBCUTANEOUS at 08:45

## 2025-01-22 SDOH — SOCIAL STABILITY: SOCIAL INSECURITY: DO YOU FEEL ANYONE HAS EXPLOITED OR TAKEN ADVANTAGE OF YOU FINANCIALLY OR OF YOUR PERSONAL PROPERTY?: NO

## 2025-01-22 SDOH — SOCIAL STABILITY: SOCIAL INSECURITY: WERE YOU ABLE TO COMPLETE ALL THE BEHAVIORAL HEALTH SCREENINGS?: YES

## 2025-01-22 SDOH — SOCIAL STABILITY: SOCIAL INSECURITY: HAVE YOU HAD THOUGHTS OF HARMING ANYONE ELSE?: NO

## 2025-01-22 SDOH — SOCIAL STABILITY: SOCIAL INSECURITY: WITHIN THE LAST YEAR, HAVE YOU BEEN AFRAID OF YOUR PARTNER OR EX-PARTNER?: NO

## 2025-01-22 SDOH — SOCIAL STABILITY: SOCIAL INSECURITY: ABUSE: ADULT

## 2025-01-22 SDOH — SOCIAL STABILITY: SOCIAL INSECURITY: WITHIN THE LAST YEAR, HAVE YOU BEEN HUMILIATED OR EMOTIONALLY ABUSED IN OTHER WAYS BY YOUR PARTNER OR EX-PARTNER?: NO

## 2025-01-22 SDOH — SOCIAL STABILITY: SOCIAL INSECURITY: HAVE YOU HAD ANY THOUGHTS OF HARMING ANYONE ELSE?: NO

## 2025-01-22 SDOH — ECONOMIC STABILITY: FOOD INSECURITY: WITHIN THE PAST 12 MONTHS, THE FOOD YOU BOUGHT JUST DIDN'T LAST AND YOU DIDN'T HAVE MONEY TO GET MORE.: NEVER TRUE

## 2025-01-22 SDOH — ECONOMIC STABILITY: INCOME INSECURITY: IN THE PAST 12 MONTHS HAS THE ELECTRIC, GAS, OIL, OR WATER COMPANY THREATENED TO SHUT OFF SERVICES IN YOUR HOME?: YES

## 2025-01-22 SDOH — HEALTH STABILITY: PHYSICAL HEALTH: ON AVERAGE, HOW MANY MINUTES DO YOU ENGAGE IN EXERCISE AT THIS LEVEL?: 0 MIN

## 2025-01-22 SDOH — ECONOMIC STABILITY: FOOD INSECURITY: WITHIN THE PAST 12 MONTHS, YOU WORRIED THAT YOUR FOOD WOULD RUN OUT BEFORE YOU GOT THE MONEY TO BUY MORE.: NEVER TRUE

## 2025-01-22 SDOH — SOCIAL STABILITY: SOCIAL INSECURITY: ARE YOU OR HAVE YOU BEEN THREATENED OR ABUSED PHYSICALLY, EMOTIONALLY, OR SEXUALLY BY ANYONE?: NO

## 2025-01-22 SDOH — ECONOMIC STABILITY: FOOD INSECURITY: HOW HARD IS IT FOR YOU TO PAY FOR THE VERY BASICS LIKE FOOD, HOUSING, MEDICAL CARE, AND HEATING?: SOMEWHAT HARD

## 2025-01-22 SDOH — SOCIAL STABILITY: SOCIAL INSECURITY: HAS ANYONE EVER THREATENED TO HURT YOUR FAMILY OR YOUR PETS?: NO

## 2025-01-22 SDOH — SOCIAL STABILITY: SOCIAL INSECURITY: DO YOU FEEL UNSAFE GOING BACK TO THE PLACE WHERE YOU ARE LIVING?: NO

## 2025-01-22 SDOH — SOCIAL STABILITY: SOCIAL INSECURITY: ARE THERE ANY APPARENT SIGNS OF INJURIES/BEHAVIORS THAT COULD BE RELATED TO ABUSE/NEGLECT?: NO

## 2025-01-22 SDOH — HEALTH STABILITY: PHYSICAL HEALTH: ON AVERAGE, HOW MANY DAYS PER WEEK DO YOU ENGAGE IN MODERATE TO STRENUOUS EXERCISE (LIKE A BRISK WALK)?: 0 DAYS

## 2025-01-22 SDOH — SOCIAL STABILITY: SOCIAL INSECURITY: DOES ANYONE TRY TO KEEP YOU FROM HAVING/CONTACTING OTHER FRIENDS OR DOING THINGS OUTSIDE YOUR HOME?: NO

## 2025-01-22 ASSESSMENT — ACTIVITIES OF DAILY LIVING (ADL)
HEARING - LEFT EAR: UNABLE TO ASSESS
WALKS IN HOME: NEEDS ASSISTANCE
FEEDING YOURSELF: NEEDS ASSISTANCE
ASSISTIVE_DEVICE: EYEGLASSES;WALKER;CANE
HEARING - RIGHT EAR: UNABLE TO ASSESS
BATHING: NEEDS ASSISTANCE
LACK_OF_TRANSPORTATION: NO
GROOMING: DEPENDENT
ADEQUATE_TO_COMPLETE_ADL: UNABLE TO ASSESS
TOILETING: DEPENDENT
JUDGMENT_ADEQUATE_SAFELY_COMPLETE_DAILY_ACTIVITIES: NO
PATIENT'S MEMORY ADEQUATE TO SAFELY COMPLETE DAILY ACTIVITIES?: NO
DRESSING YOURSELF: DEPENDENT

## 2025-01-22 ASSESSMENT — COGNITIVE AND FUNCTIONAL STATUS - GENERAL
TOILETING: TOTAL
HELP NEEDED FOR BATHING: TOTAL
EATING MEALS: A LOT
DRESSING REGULAR LOWER BODY CLOTHING: TOTAL
HELP NEEDED FOR BATHING: TOTAL
DRESSING REGULAR UPPER BODY CLOTHING: A LOT
PERSONAL GROOMING: A LITTLE
TURNING FROM BACK TO SIDE WHILE IN FLAT BAD: TOTAL
PERSONAL GROOMING: TOTAL
DRESSING REGULAR LOWER BODY CLOTHING: TOTAL
WALKING IN HOSPITAL ROOM: TOTAL
DAILY ACTIVITIY SCORE: 7
STANDING UP FROM CHAIR USING ARMS: A LOT
DAILY ACTIVITIY SCORE: 14
MOBILITY SCORE: 6
MOVING FROM LYING ON BACK TO SITTING ON SIDE OF FLAT BED WITH BEDRAILS: TOTAL
MOVING FROM LYING ON BACK TO SITTING ON SIDE OF FLAT BED WITH BEDRAILS: TOTAL
DAILY ACTIVITIY SCORE: 7
MOVING TO AND FROM BED TO CHAIR: TOTAL
MOVING TO AND FROM BED TO CHAIR: A LOT
DRESSING REGULAR LOWER BODY CLOTHING: A LOT
CLIMB 3 TO 5 STEPS WITH RAILING: TOTAL
HELP NEEDED FOR BATHING: A LOT
STANDING UP FROM CHAIR USING ARMS: TOTAL
MOVING FROM LYING ON BACK TO SITTING ON SIDE OF FLAT BED WITH BEDRAILS: A LOT
WALKING IN HOSPITAL ROOM: TOTAL
EATING MEALS: A LITTLE
DRESSING REGULAR UPPER BODY CLOTHING: TOTAL
MOVING TO AND FROM BED TO CHAIR: TOTAL
MOBILITY SCORE: 9
TOILETING: TOTAL
TURNING FROM BACK TO SIDE WHILE IN FLAT BAD: TOTAL
MOBILITY SCORE: 6
DRESSING REGULAR UPPER BODY CLOTHING: TOTAL
TOILETING: A LOT
EATING MEALS: A LOT
WALKING IN HOSPITAL ROOM: TOTAL
TURNING FROM BACK TO SIDE WHILE IN FLAT BAD: TOTAL
PATIENT BASELINE BEDBOUND: UNABLE TO ASSESS AT THIS TIME
CLIMB 3 TO 5 STEPS WITH RAILING: TOTAL
PERSONAL GROOMING: TOTAL
CLIMB 3 TO 5 STEPS WITH RAILING: TOTAL
STANDING UP FROM CHAIR USING ARMS: TOTAL

## 2025-01-22 ASSESSMENT — PAIN SCALES - GENERAL
PAINLEVEL_OUTOF10: 0 - NO PAIN

## 2025-01-22 ASSESSMENT — LIFESTYLE VARIABLES
AUDIT-C TOTAL SCORE: 0
PRESCIPTION_ABUSE_PAST_12_MONTHS: NO
HOW OFTEN DO YOU HAVE 6 OR MORE DRINKS ON ONE OCCASION: NEVER
AUDIT-C TOTAL SCORE: 0
SKIP TO QUESTIONS 9-10: 1
HOW OFTEN DO YOU HAVE A DRINK CONTAINING ALCOHOL: NEVER
SUBSTANCE_ABUSE_PAST_12_MONTHS: NO
HOW MANY STANDARD DRINKS CONTAINING ALCOHOL DO YOU HAVE ON A TYPICAL DAY: PATIENT DOES NOT DRINK

## 2025-01-22 ASSESSMENT — PATIENT HEALTH QUESTIONNAIRE - PHQ9
2. FEELING DOWN, DEPRESSED OR HOPELESS: NOT AT ALL
SUM OF ALL RESPONSES TO PHQ9 QUESTIONS 1 & 2: 0
1. LITTLE INTEREST OR PLEASURE IN DOING THINGS: NOT AT ALL

## 2025-01-22 ASSESSMENT — COLUMBIA-SUICIDE SEVERITY RATING SCALE - C-SSRS
2. HAVE YOU ACTUALLY HAD ANY THOUGHTS OF KILLING YOURSELF?: NO
1. IN THE PAST MONTH, HAVE YOU WISHED YOU WERE DEAD OR WISHED YOU COULD GO TO SLEEP AND NOT WAKE UP?: NO
6. HAVE YOU EVER DONE ANYTHING, STARTED TO DO ANYTHING, OR PREPARED TO DO ANYTHING TO END YOUR LIFE?: NO

## 2025-01-22 ASSESSMENT — PAIN - FUNCTIONAL ASSESSMENT
PAIN_FUNCTIONAL_ASSESSMENT: 0-10
PAIN_FUNCTIONAL_ASSESSMENT: 0-10

## 2025-01-22 NOTE — DOCUMENTATION CLARIFICATION NOTE
PATIENT:               BUSHRA MCLEAN  ACCT #:                  [id number]  MRN:                       [id number]  :                       1955  ADMIT DATE:       2025 8:01 PM  DISCH DATE:  RESPONDING PROVIDER #:        [id number]          PROVIDER RESPONSE TEXT:    Multifactorial encephalopathy 2/2 Cerebral infarction as evident by altered mental status    CDI QUERY TEXT:    Clarification    Instruction:    Based on your assessment of the patient and the clinical information, please provide the requested documentation by clicking on the appropriate radio button and enter any additional information if prompted.    Question: Please further clarify the most likely etiology of the altered mental status as    When answering this query, please exercise your independent professional judgment. The fact that a question is being asked, does not imply that any particular answer is desired or expected.    The patient's clinical indicators include:  Clinical Information: 69y.o. M admitted w/generalized weakness, cognitive change & type 1 DM.     ED Provider Note: Assessment/evaluation concerning for encephalopathy of unclear etiology. I have concerns with patient's ability to care for self and risk of falls at home, agreeable to admission for rehab placement.     H&P: Generalized weakness, he also has a history of necrotizing myopathy which required immunotherapy. Cognitive change. Type 1 diabetes mellitus     Teleconsult: This morning, he became acutely unresponsive, bradycardic followed by spontaneous improvement in mentation but was noted to have severe, persistent R sided weakness while recovering. LMCA stenosis in proximal segment and L A2 occlusion, likely caused symptoms in the setting of bradycardia/ heart block.     Progress Note: Transient stroke-like picture with transient Rt-sided hemiplegia. Rhabdomyolysis, resolving with hydration. Cognitive change     Progress Note: Altered mental status  secondary to new CVA. Intolerant to statin due to history of myositis/rhabdomyolysis.    1/22 Progress Note: Acute stroke, continue aspirin and Plavix. Patient has intracerebral stenosis. According to telestroke neurologist, bradycardia likely precipitated the stroke given intracerebral stenosis, causing hypoperfusion.    Clinical Indicators:  1/18 Cr 2.11, GFR 33, BUN 26, Creatine Kinase 970  1/19 Cr 1.95, GFR 36, BUN 23  1/20 Cr 1.59, GFR 46, BUN 19, Creatine Kinase 775  1/21 Cr 1.85, GFR 38, BUN 20, Creatine Kinase 735  1/22 Cr 2.04, GFR 34, BUN 25, Anion Gap 9, Ca 7.7    Treatment:  1/18 Naloxone 1-2mg IV x2 doses    Risk Factors: PMHx: CKD, Patent foramen ovale, Necrotizing myopathy, BMI: 30.2  Options provided:  -- Metabolic encephalopathy 2/2 subacute Cerebral infarction as evident by altered mental status  -- Multifactorial encephalopathy 2/2 Cerebral infarction as evident by altered mental status  -- Other - I will add my own diagnosis  -- Refer to Clinical Documentation Reviewer    Query created by: Jenni Mast on 1/22/2025 2:03 PM      Electronically signed by:  SHAKIR WHALEY MD 1/22/2025 2:19 PM

## 2025-01-22 NOTE — H&P
History Of Present Illness  Tony King is a 69 y.o. male transferred from [facility] to [facility] concerning of AV block.  Patient was admitted at [facility] with generalized weakness.  He was too weak to ambulate.  Patient was admitted for further evaluation treatment.  He has history of necrotizing myopathy which required immunotherapy in the past.  Patient has type 1 diabetes mellitus and is on long-acting insulin and insulin sliding scale.  During hospital stay at [facility] patient became bradycardic rapid response was called.  Patient was found to be unresponsive.  CT scan of the brain did not reveal acute intracranial findings, CTA head and neck no evidence of large vessel occlusion severe short segment stenosis in V1 segment of the right vertebral artery.  Patient had multinodular thyroid.  Telestroke team was contacted.  Patient was not found to be candidate for tenecteplase or any intervention.  MRI brain done on 1/21/2025 revealed acute or subacute infarct located within the left cerebral hemisphere in the territory of PAULIE.  At [facility] patient was evaluated by Dr. Nelson from nephrology neurology services.  During hospital stay patient had episode of AV block.  He was evaluated by Dr. Olivarez from cardiology services.  Diagnoses requested transfer to [facility] to be evaluated by electrophysiology for possible pacemaker implant.  When I evaluated the patient on admission he denied chest pain or shortness of breath.  Patient seem to be slightly weaker on the right side.  He denied blurry vision or loss of vision.  He denied headache.  Patient denied to have pain in upper or lower extremities.  Patient was started on aspirin and Plavix.  Recommendation for telestroke team was to continue with aspirin and Plavix for 90 days and continue with aspirin monotherapy after.  Patient denies fever or chills.  On admission he was found to have  rhabdomyolysis,  statin was  discontinued.  At [facility] when I evaluate the patient he has heart rate 72, normal sinus rhythm on monitor    Past Medical History  Past Medical History:   Diagnosis Date    Chronic kidney disease, unspecified 09/26/2014    Chronic renal insufficiency    Essential (primary) hypertension 09/26/2014    Benign essential hypertension    History of deep venous thrombosis 12/22/2022    History of diabetes mellitus 04/05/2024    History of elevated lipids 04/05/2024    Patent foramen ovale (HHS-HCC) 09/26/2014    PFO (patent foramen ovale)    Personal history of other endocrine, nutritional and metabolic disease 09/26/2014    History of diabetes mellitus    Personal history of other endocrine, nutritional and metabolic disease 09/26/2014    History of hyperlipidemia    Personal history of other venous thrombosis and embolism 09/26/2014    History of deep venous thrombosis    Personal history of pulmonary embolism 12/22/2022    Personal history of transient ischemic attack (TIA), and cerebral infarction without residual deficits 09/26/2014    History of stroke    Pressure sore on buttocks 11/15/2022       Surgical History  Past Surgical History:   Procedure Laterality Date    US GUIDED PERCUTANEOUS PLACEMENT  7/20/2020    US GUIDED PERCUTANEOUS PLACEMENT [facility]        Social History  He reports that he has never smoked. He has never used smokeless tobacco. He reports that he does not drink alcohol and does not use drugs.    Family History  No family history on file.     Allergies  Statins-hmg-coa reductase inhibitors, Ciprofloxacin, and Penicillins    Review of Systems  General: Negative for fever,  chills ,positive for fatigue.    HEENT: Negative for headache, blurring of vision or double vision.    Cardiovascular: Negative for chest pain, palpitations or orthopnea.    Respiratory: Negative for cough, shortness of breath or wheezing.    Gastrointestinal: Negative for nausea, vomiting, hematemesis, abdominal  pain or diarrhea.   Genitourinary: Negative for dysuria, hematuria, frequency or nocturia.   Musculoskeletal: Negative for joint pain, joint swelling or deformity.   Skin: Negative for rash, itching, or jaundice.   Hematologic: Negative for bleeding or bruising.   Neurologic: Negative for headache   Psychological: Negative for anxiety, hallucinations or depression.      Physical Exam   General: Pleasant, cooperating during physical exam.  HEENT: Pupils are equal and reactive to light and commendation , oral mucosa moist, no JVD.  Cardiovascular: PMI nondisplaced, heart rate 82  Lungs: Clear to auscultation bilaterally, no wheezing, no crackles, no dullness to percussion.  Abdomen: No hepatosplenomegaly appreciated, soft , not tender, positive bowel sounds, positive bowel movement.  Neuro: Alert and oriented x2, strength on right upper extremity and right lower extremity 4 out of 5   psych: Not great insight was going  Musculoskeletal: Right great toe amputated,  Vascular: Pulses are intact in upper and lower extremities  Skin: Changes in lower extremities from venous stasis    Last Recorded Vitals  Blood pressure 162/74, temperature 36.4 °C (97.5 °F), temperature source Temporal, resp. rate 16, SpO2 98%.    Relevant Results     Assessment/Plan     Bradycardia  Episode of AV block  Monitor patient is normal sinus rhythm with a heart rate 82  Patient was transferred from [facility] to [facility] to be evaluated by electrophysiology.  Consult Dr. Correa from electrophysiology services.  Monitor close   Monitor in stepdown    Right sided weakness  Acute CVA  CT brain no acute intracranial finding  MRI brain revealed acute versus subacute infarct located in the left cerebral hemisphere in the territory PAULIE.  Patient was started on aspirin and Plavix.  Telestroke neurology recommended to continue with aspirin and Plavix for 90 days and aspirin after.  Patient was evaluated by Dr. Nelson from neurology services.  I  will reconsult Dr. Nelson.    History of  myositis  Rhabdomyolysis  Check CK in a.m.  Hold statins    Type 1 diabetes mellitus  I will start the patient on the Lantus and insulin sliding scale  Check hemoglobin A1c  Monitor close diabetic diet Accu-Chek before meals and at bedtime.    Morbid obesity  BMI 32.13   advised regarding diet.    Acute kidney injury  Most likely secondary to IV contrast  Avoid nephrotoxic drugs  Consult Dr. Mazariegos urology services    DVT prophylaxis    Deconditioning  PT OT to see him  Fall precaution  Ambulate with assistance    Multinodular thyroid  I will refer the patient to Dr. Loredo endocrinologist as outpatient  Patient understood importance of following up with endocrinology    Discussed with the nurse at bedside    I spent 75 minutes in the professional and overall care of this patient.      Alton Lambert MD

## 2025-01-22 NOTE — DISCHARGE SUMMARY
"Discharge Diagnosis  Generalized weakness  AV block, intermittent  Acute CVA  ARF  HTN  Rhabdomyolysis  Type 2 diabetes      Issues Requiring Follow-Up  AV block, intermittent  Acute CVA  ARF  HTN  Rhabdomyolysis  Type 2 diabetes    Discharge Meds     Medication List      CONTINUE taking these medications     BD Ultra-Fine Orig Pen Needle 29 gauge x 1/2\" needle; Generic drug: pen   needle 1/2\"   Dexcom G6  misc; Generic drug: blood-glucose meter,continuous   Dexcom G6 Sensor device; Generic drug: blood-glucose sensor   Dexcom G6 Transmitter device; Generic drug: blood-glucose transmitter   device     ASK your doctor about these medications     amLODIPine 2.5 mg tablet; Commonly known as: Norvasc   aspirin 81 mg chewable tablet; Chew 1 tablet (81 mg) once daily.   clopidogrel 75 mg tablet; Commonly known as: Plavix; Take 1 tablet (75   mg) by mouth once daily.   Colace 100 mg capsule; Generic drug: docusate sodium   Contour Next Test Strips strip; Generic drug: blood sugar diagnostic   ergocalciferol 1.25 MG (03804 UT) capsule; Commonly known as: Vitamin   D-2; Take 1 capsule (1,250 mcg) by mouth 1 (one) time per week.   finasteride 5 mg tablet; Commonly known as: Proscar; Take 1 tablet (5   mg) by mouth once daily.   glucagon 1 mg injection; Commonly known as: Glucagen   HYDROcodone-acetaminophen 5-325 mg tablet; Commonly known as: Norco   insulin lispro 100 unit/mL injection; Commonly known as: HumaLOG KwikPen   Insulin; Inject 20 Units under the skin once daily with breakfast AND 20   Units once daily at noon. Take with meals AND 20 Units once daily in the   evening. Take with meals. Take as directed per insulin instructions.   irbesartan 300 mg tablet; Commonly known as: Avapro; Take 1 tablet (300   mg) by mouth once daily.   ketoconazole 2 % cream; Commonly known as: NIZOral   KETOROLAC ORAL   labetalol 100 mg tablet; Commonly known as: Normodyne; Take 1 tablet   (100 mg) by mouth 2 times a day.   " Levemir FlexTouch U100 Insulin 100 unit/mL (3 mL) pen; Generic drug:   insulin detemir; Inject 70 Units under the skin once daily in the morning.   Take as directed per insulin instructions.   meclizine 25 mg tablet; Commonly known as: Antivert   mirabegron 50 mg tablet extended release 24 hr 24 hr tablet; Commonly   known as: Myrbetriq   pantoprazole 40 mg EC tablet; Commonly known as: ProtoNix   polyethylene glycol 1 gram packet; Commonly known as: Glycolax, Miralax   sennosides-docusate sodium 8.6-50 mg tablet; Commonly known as:   Linda-Colace   tamsulosin 0.4 mg 24 hr capsule; Commonly known as: Flomax; Take 1   capsule (0.4 mg) by mouth once daily.   traMADol 50 mg tablet; Commonly known as: Ultram       Test Results Pending At Discharge  Pending Labs       Order Current Status    Extra Urine Gray Tube Collected (01/18/25 9603)    Urinalysis with Reflex Culture and Microscopic In process            Hospital Course   History:  Tony King is a 69 y.o. male who presented with Weakness, Gen (Patient has has multiple lift assists in the last couple days, EMS arrived to find patient sinking down onto floor from chair. Patient too weak to ambulate or help himself back up., no complaints otherwise) and is admitted for Generalized weakness.  According to the patient's sister, patient had significant weakness in his right leg, he was not able to ambulate initially, symptoms resolved.  Patient was brought to the hospital, he had elevated CK with the level of 970.  Statins were discontinued and CK level improved.  Of note, patient has a history of necrotizing myositis in 2020.  Patient was evaluated by neurologist initial impression was that patient has elevated CK due to rhabdomyolysis.  Initial CAT scan of the brain did not demonstrate any acute abnormalities.  Patient was admitted to the hospital and plan was to transfer him to an acute rehab due to weakness when he developed a syncopal episode.  Patient was  unresponsive with bradycardia on the monitor.  During that event blood pressure was stable and patient had blood glucose of 180.  Patient had complete paralysis on the right side which resolved.  CAT scan of the brain without contrast did not demonstrate any acute findings, CT angio of the head demonstrated:  1. No LVO. Abrupt cut off flow related enhancement A2 segment left  anterior cerebral artery, suspected thrombosis, with faint distal  reconstitution.  2. Severe short-segment stenosis V1 segment right vertebral artery.  Moderate short-segment stenosis of the V1 segment of the left  vertebral artery. Suspect atherosclerotic involvement though  thrombosis not excluded.  3. Multinodular thyroid. Suggest correlation with thyroid ultrasound  to assess for possible neoplasm.  Findings were discussed with telestroke neurologist:  CTA of the brain demonstrated  LMCA stenosis, likely caused symptoms in the setting of bradycardia. Continue ASA/Plavix.     MRI of the brain 01/21/25: Acute/subacute stroke, no hemorrhagic transformation.     Patient was transferred to stepdown unit.  He was consulted by cardiologist.  EKG monitoring demonstrated episodes of 2-1 AV block.  Case was discussed with electrophysiology and they feel that patient will benefit from a pacemaker placement.    Patient developed worsening of his renal function due to an episode of bradycardia and IV dye patient was exposed to for CT angiogram.  Will holding  ARB.  Nephrology consulted.      Case was discussed with Pt's sister, she is agreeable.    Case was discussed with an accepting hospitalist, Dr. Lambert, at .    Time spent with Pt today 43 minutes.          Outpatient Follow-Up  No future appointments.      Kamila Malin MD

## 2025-01-22 NOTE — CARE PLAN
Problem: Pain - Adult  Goal: Verbalizes/displays adequate comfort level or baseline comfort level  Outcome: Progressing     Problem: Safety - Adult  Goal: Free from fall injury  Outcome: Progressing     Problem: Discharge Planning  Goal: Discharge to home or other facility with appropriate resources  Outcome: Progressing     Problem: Chronic Conditions and Co-morbidities  Goal: Patient's chronic conditions and co-morbidity symptoms are monitored and maintained or improved  Outcome: Progressing     Problem: Nutrition  Goal: Nutrient intake appropriate for maintaining nutritional needs  Outcome: Progressing     Problem: Fall/Injury  Goal: Not fall by end of shift  Outcome: Progressing  Goal: Be free from injury by end of the shift  Outcome: Progressing  Goal: Verbalize understanding of personal risk factors for fall in the hospital  Outcome: Progressing  Goal: Verbalize understanding of risk factor reduction measures to prevent injury from fall in the home  Outcome: Progressing  Goal: Use assistive devices by end of the shift  Outcome: Progressing  Goal: Pace activities to prevent fatigue by end of the shift  Outcome: Progressing     Problem: Cardiac catheterization  Goal: Free from dysrhythmias  Outcome: Progressing  Goal: Free from pain  Outcome: Progressing  Goal: No evidence of post procedure complications  Outcome: Progressing  Goal: Promote self management  Outcome: Progressing  Goal: Verbalize understanding of procedure  Outcome: Progressing  Goal: Care and maintenance of device (specify)  Outcome: Progressing

## 2025-01-22 NOTE — PROGRESS NOTES
Physical Therapy    Physical Therapy Treatment    Patient Name: Tony King  MRN: [id number]  Department: [address]  Room: [address] [place] [address]  Today's Date: 1/22/2025  Time Calculation  Start Time: 0939  Stop Time: 1017  Time Calculation (min): 38 min         Assessment/Plan   PT Assessment  PT Assessment Results: Decreased strength, Decreased endurance, Impaired balance, Decreased mobility, Impaired judgement, Decreased safety awareness, Decreased cognition, Decreased coordination  Rehab Prognosis: Good  Barriers to Discharge Home: Physical needs, Cognition needs  Cognition Needs: 24hr supervision for safety awareness needed  Physical Needs: 24hr mobility assistance needed  Evaluation/Treatment Tolerance: Patient limited by fatigue  Medical Staff Made Aware: Yes  Strengths: Premorbid level of function  Barriers to Participation: Comorbidities  End of Session Communication: Bedside nurse (Spoke to nurse to call us if needed back to bed or chair should be switch around so strong LE towards bed so he lead back to bed with strong LE with Max A x 2)  Assessment Comment: Max A x 2 out of bed. Mod A x 2 for STS transfers. Max A x 2 mobility bed to recliner  End of Session Patient Position: Up in chair, Alarm on (Needs at reach)     PT Plan  Treatment/Interventions: Bed mobility, Transfer training, Neuromuscular re-education, Balance training, Strengthening, Endurance training, Therapeutic exercise, Therapeutic activity, Positioning, Postural re-education  PT Plan: Ongoing PT  PT Frequency: 4 times per week  PT Discharge Recommendations: High intensity level of continued care  Equipment Recommended upon Discharge: Lift  PT Recommended Transfer Status: Assist x2, Total assist, Assistive device  PT - OK to Discharge: Yes      General Visit Information:   PT  Visit  PT Received On: 01/22/25  General  Reason for Referral: impaired mobility; generalized weakness  Referred By: Dr. Malin  Past Medical History Relevant to Rehab: Chronic  kidney disease, unspecified (09/26/2014), Essential (primary) hypertension (09/26/2014), History of deep venous thrombosis (12/22/2022), History of diabetes mellitus (04/05/2024), History of elevated lipids (04/05/2024), Patent foramen ovale (HHS-HCC) (09/26/2014), Personal history of other endocrine, nutritional and metabolic disease (09/26/2014), Personal history of other endocrine, nutritional and metabolic disease (09/26/2014), Personal history of other venous thrombosis and embolism (09/26/2014), Personal history of pulmonary embolism (12/22/2022), Personal history of transient ischemic attack (TIA), and 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  Co-Treatment: OT  Co-Treatment Reason: 2 skilled persons for safety with mobility  Prior to Session Communication: Bedside nurse  Patient Position Received: Bed, 3 rail up, Alarm on  Preferred Learning Style: verbal, visual  General Comment: Cleared by nurse. Patient agreeable up to recliner    Subjective   Precautions:  Precautions  Hearing/Visual Limitations: glasses  Medical Precautions: Fall precautions  Precautions Comment: MRI brain 1/21: Acute or subacute infarcts located within the left cerebral  hemisphere     Date/Time Vitals Session Patient Position Pulse Resp SpO2 BP MAP (mmHg)    01/22/25 0939 During PT  --  --  --  --  164/65  --     01/22/25 0955 --  --  --  --  98 %  147/64  --     01/22/25 1121 --  --  83  14  99 %  128/63  82           Vital Signs Comment: /64 once in recliner     Objective   Pain:  Pain Assessment  Pain Assessment: 0-10  0-10 (Numeric) Pain Score: 0 - No pain  Pain Interventions: Repositioned (In brecliner)  Cognition:  Cognition  Overall Cognitive Status: Within Functional Limits  Orientation Level: Disoriented to situation  Following Commands: Follows one step commands with repetition  Safety Judgment: Decreased awareness of need for safety precautions  Cognition  Comments: Delayed processing  Insight: Moderate  Impulsive: Mildly  Processing Speed: Delayed (One step cues)  Coordination:  Coordination Comment: Slow movements, primarily in RLE.  Postural Control:  Postural Control  Posture Comment: flexed posture, rounded shoulders  Static Sitting Balance  Static Sitting-Balance Support: Bilateral upper extremity supported, Feet supported  Static Sitting-Level of Assistance: Minimum assistance (Retropulsive)  Static Sitting-Comment/Number of Minutes: Retropulsive EOB  Static Standing Balance  Static Standing-Balance Support: Bilateral upper extremity supported  Static Standing-Level of Assistance: Moderate assistance (x 2)  Static Standing-Comment/Number of Minutes: STS with Mod A x 2 Asisst with R hand placement RW with decreased grasp onto RW  Dynamic Standing Balance  Dynamic Standing-Balance Support: Bilateral upper extremity supported  Dynamic Standing-Level of Assistance: Maximum assistance (x 2)  Dynamic Standing-Balance: Turning  Dynamic Standing-Comments: Max A x 2 with lateral backwards steps to recliner  Extremity/Trunk Assessments:    Activity Tolerance:  Activity Tolerance  Endurance: Decreased tolerance for upright activites  Treatments:  Therapeutic Exercise  Therapeutic Exercise Performed: Yes  Therapeutic Exercise Activity 1: B LE supine ther ex: ankle pumps, assist R needed, quad/gluteal sets- trace with gaid sets, active gluteal sets, AROM L LE  AAROM R LE x 15 L LE 2 sets of 7 R LE Needs rest break    Therapeutic Activity  Therapeutic Activity Performed: Yes  Therapeutic Activity 1: Patient sat EOB x 5 minutes initially retropulsive then Min A for upright sitting STS with Mod A x 2 for  x 2 trials STS  Cues for upright posture. 3 lateral side steps from bed with Max A x 2 patient able to shuffle R LE then 4 backwards steps with Max A x 2 Able to move L LE backwards then follow with R LE shuffling back to recliner .    Bed Mobility  Bed Mobility: Yes  Bed  Mobility 1  Bed Mobility 1: Supine to sitting  Level of Assistance 1: Maximum assistance, +2, Moderate verbal cues  Bed Mobility Comments 1: Max A with moderate cues for sequencing for assist to bedrail Slow processing Draw sheet assist to get B feet to the floor    Transfers  Transfer: Yes  Transfer 1  Transfer From 1: Sit to, Bed to  Transfer to 1: Stand  Technique 1: Sit to stand, Stand to sit  Transfer Device 1: Walker  Transfer Level of Assistance 1: Moderate assistance, +2  Trials/Comments 1: STS from bed with Mod A x 2 x 2 trials  Transfers 2  Transfer From 2: Chair with arms to  Transfer to 2: Stand  Technique 2: To left  Transfer Device 2: Walker, Gait belt  Transfer Level of Assistance 2: Moderate assistance, +2  Trials/Comments 2: Mod A x 2 with max cues to left to recliner  Transfers 3  Transfer From 3: Chair with arms to  Transfer to 3: Stand  Technique 3: Sit to stand, Stand to sit  Transfer Device 3: Walker, Gait belt  Transfer Level of Assistance 3: Moderate assistance, +2, Moderate verbal cues  Trials/Comments 3: STS from recliner with Mod A x 2 with moderate cues to activate chair alarm    Outcome Measures:  [facility] Basic Mobility  Turning from your back to your side while in a flat bed without using bedrails: A lot  Moving from lying on your back to sitting on the side of a flat bed without using bedrails: Total  Moving to and from bed to chair (including a wheelchair): A lot  Standing up from a chair using your arms (e.g. wheelchair or bedside chair): A lot  To walk in hospital room: Total  Climbing 3-5 steps with railing: Total  Basic Mobility - Total Score: 9    Education Documentation  Precautions, taught by Altagracia Cox PTA at 1/22/2025 11:31 AM.  Learner: Patient  Readiness: Acceptance  Method: Teach-back  Response: Needs Reinforcement  Comment: Max A x 2 out of bed Max A x 2 with side/backwards steps to recliner STS Mod A x 2    Body Mechanics, taught by Altagracia Cox PTA at  1/22/2025 11:31 AM.  Learner: Patient  Readiness: Acceptance  Method: Teach-back  Response: Needs Reinforcement  Comment: Max A x 2 out of bed Max A x 2 with side/backwards steps to recliner STS Mod A x 2    Home Exercise Program, taught by Altagracia Cox PTA at 1/22/2025 11:31 AM.  Learner: Patient  Readiness: Acceptance  Method: Teach-back  Response: Needs Reinforcement  Comment: Max A x 2 out of bed Max A x 2 with side/backwards steps to recliner STS Mod A x 2    Mobility Training, taught by Altagracia Cox PTA at 1/22/2025 11:31 AM.  Learner: Patient  Readiness: Acceptance  Method: Teach-back  Response: Needs Reinforcement  Comment: Max A x 2 out of bed Max A x 2 with side/backwards steps to recliner STS Mod A x 2    Education Comments  No comments found.        OP EDUCATION:       Encounter Problems       Encounter Problems (Active)       PT Problem       PT Goal 1 (Progressing)       Start:  01/19/25    Expected End:  02/02/25       Pt will ambulate 15' close supervision with walker to promote safe home mobility .         PT Goal 2 (Progressing)       Start:  01/19/25    Expected End:  02/02/25       Pt will transfer from sitting edge of bed to the chair with supervision assist and walker to promote out of bed activity and reduce the risks of prolonged acute care bedrest          PT Goal 3 (Progressing)       Start:  01/19/25    Expected End:  02/02/25       Pt will complete bed mobility with moderate assist assist including scooting and rolling L/R hand rail use as needed to promote out of bed activity, comfort, and repositioning           PT Goal 4 (Progressing)       Start:  01/19/25    Expected End:  02/02/25       Pt will transfer sit to standing position with moderate assist assist and walker to promote safe out of bed activity             Pain - Adult

## 2025-01-22 NOTE — PROGRESS NOTES
01/22/25 1434   Discharge Planning   Expected Discharge Disposition Othe   Does the patient need discharge transport arranged? Yes   RoundTrip coordination needed? Yes   Has discharge transport been arranged? No     Obtained copy of insurance card yesterday and called  [employer].  (Copy sent to registration)   Only SNF and hospital approved is [facility].    [facility] , so, [facility] will likely pay the bill here.  Patient transferred to [facility] today for pace maker implant.  I have sent updates to the [facility].  Notified patient's sister,  Kassandra Ward, [phone], that insurance told me only [facility] covered for SNF and hospital.  High intensity rehab is recommended.  Patient could possible go [facility] or [facility]F.     Sister and family would like [facility].   Will put referral in [facility] to [facility].    VA representative is Ivanna - [phone]  x [phone].  She is not in today, Wednesday.

## 2025-01-22 NOTE — PROGRESS NOTES
Tony King is a 69 y.o. male on day 3 of admission presenting with Generalized weakness.      Subjective   Pt states he feels better. No overnight events.   Denies any focal neuro- deficits.    Objective     Last Recorded Vitals  /64 (BP Location: Left arm)   Pulse 69   Temp 36.6 °C (97.9 °F) (Temporal)   Resp 13   Wt 123 kg (271 lb)   SpO2 98% Comment: on room air, cues to use PLB during transfer completion  Intake/Output last 3 Shifts:    Intake/Output Summary (Last 24 hours) at 1/22/2025 1037  Last data filed at 1/22/2025 0955  Gross per 24 hour   Intake 350 ml   Output --   Net 350 ml       Admission Weight  Weight: 120 kg (264 lb) (01/18/25 2001)    Daily Weight  01/22/25 : 123 kg (271 lb)    Image Results  EEG  IMPRESSION    Impression  This is a normal awake routine EEG. No epileptiform discharges or lateralizing signs seen.    A full report will be scanned into the patient's chart at a later time.    This report has been interpreted and electronically signed by      Physical Exam  Location: 422  Pt is sitting up in chair.  Pt is NAD.  Cooperative with exam.  In no distress at rest.  A, Ox3.  Face is symmetrical.  Skin - no lesions.  Lungs: clear to auscultations B/L. No wheezes, rales, rhonchi.  Heart: regular S1S2.  Abdomen: soft, NT, ND. BS positive.  Extr.: no edema, cords, cyanosis.  Moves all extr.   Relevant Results    Monitor strips reviewed:  Frequent episodes of skipped QRS complexes, looks like Mobitz II.    Results for orders placed or performed during the hospital encounter of 01/18/25 (from the past 24 hours)   POCT GLUCOSE   Result Value Ref Range    POCT Glucose 192 (H) 74 - 99 mg/dL   POCT GLUCOSE   Result Value Ref Range    POCT Glucose 216 (H) 74 - 99 mg/dL   POCT GLUCOSE   Result Value Ref Range    POCT Glucose 213 (H) 74 - 99 mg/dL   Basic Metabolic Panel   Result Value Ref Range    Glucose 155 (H) 74 - 99 mg/dL    Sodium 137 136 - 145 mmol/L    Potassium 4.5 3.5 - 5.3 mmol/L     Chloride 108 (H) 98 - 107 mmol/L    Bicarbonate 25 21 - 32 mmol/L    Anion Gap 9 (L) 10 - 20 mmol/L    Urea Nitrogen 25 (H) 6 - 23 mg/dL    Creatinine 2.04 (H) 0.50 - 1.30 mg/dL    eGFR 34 (L) >60 mL/min/1.73m*2    Calcium 7.7 (L) 8.6 - 10.3 mg/dL   POCT GLUCOSE   Result Value Ref Range    POCT Glucose 156 (H) 74 - 99 mg/dL      EEG    Result Date: 1/21/2025  IMPRESSION Impression This is a normal awake routine EEG. No epileptiform discharges or lateralizing signs seen. A full report will be scanned into the patient's chart at a later time. This report has been interpreted and electronically signed by    MR brain wo IV contrast    Result Date: 1/21/2025  Interpreted By:  Del Alexander, STUDY: MR BRAIN WO IV CONTRAST; ;  1/21/2025 11:56 am   INDICATION: Signs/Symptoms:CVA.     COMPARISON: CT head and CTA head from 01/20/2025. MRI brain from 12/10/2022.   ACCESSION NUMBER(S): [id number]   ORDERING CLINICIAN: SHAKIR WHALEY   TECHNIQUE: MRI of the brain was performed with the acquisition of axial diffusion-weighted, axial FLAIR, axial T2 fat saturated, axial T2 gradient echo, and sagittal and axial T1 weighted sequences were performed.   FINDINGS: There is restricted diffusion located within the left parasagittal frontal lobe and within the anterior portion of the left corpus callosum which is consistent with acute/subacute infarcts. There is no hemorrhagic transformation and there is no striking mass effect. There is associated FLAIR hyperintense signal in the region of the infarcts.   Ventricles, sulci, and basilar cisterns are prominent size due to age related diffuse cerebral volume loss. Stable mild ex vacuo dilatation of the left lateral ventricle due to adjacent old basal ganglia infarct.   There are small old infarcts located within the bilateral basal ganglia and thalami, unchanged in appearance compared the prior MRI. Stable small focus of T2 hyperintensity located within the white matter within the  posterior limb of the right internal capsule which is most consistent with an old infarct. There are small foci of T2 hyperintensity located within the zee which are most consistent with old infarcts. There are small old infarcts in the right cerebellum.   There are confluence regions of T2 hyperintensity within the bilateral cerebral hemispheric white matter which are probably sequela of chronic small vessel ischemic changes.   There are tiny mucosal cysts located within the right maxillary sinus and within the sphenoid sinus, otherwise the visualized paranasal sinuses and mastoid air cells are essentially clear.       1. Acute or subacute infarcts located within the left cerebral hemisphere in the territory of the PAULIE. There is no striking mass effect and there is no hemorrhagic transformation.   2. Chronic intracranial findings as above.   This study was interpreted at [facility].   MACRO: None   Signed by: Del Alexander 1/21/2025 1:00 PM Dictation workstation:   [id number]    Electrocardiogram, 12-lead PRN ACS symptoms    Result Date: 1/21/2025  Marked sinus bradycardia with AV dissociation and Junctional bradycardia with Sinus/atrial capture with Fusion complexes Left axis deviation T wave abnormality, consider lateral ischemia Abnormal ECG When compared with ECG of 18-JAN-2025 19:58, (unconfirmed) Significant changes have occurred Confirmed by Camila Olivarez ([id number]) on 1/21/2025 10:08:20 AM    Transthoracic Echo (TTE) Complete    Result Date: 1/21/2025            [address], [place] [id number]             Phone [phone] TRANSTHORACIC ECHOCARDIOGRAM REPORT Patient Name:       BUSHRA Hensley Physician:    [id number] Camila Olivarez MD Study Date:         1/21/2025            Ordering Provider:    [id number] CALDERA                                                                 BEST MRN/PID:            [id number]             Fellow: Accession#:         [id number]         Nurse: Date of Birth/Age:  1955 / 69 years  Sonographer:          Darya Womack RDCS Gender Assigned at                      Additional Staff: Birth: Height:             195.58 cm            Admit Date: Weight:             117.94 kg            Admission Status:     Inpatient -                                                                Routine BSA / BMI:          2.50 m2 / 30.83      Department Location:  [facility]                     kg/m2 Blood Pressure: 162 /79 mmHg Study Type:    TRANSTHORACIC ECHO (TTE) COMPLETE Diagnosis/ICD: Bradycardia, unspecified-R00.1; Abnormal electrocardiogram [ECG]                [EKG]-R94.31 Indication:    BRADYCARDIA, ABN EKG CPT Codes:     Echo Complete w Full Doppler-[id number] Patient History: Diabetes:          Yes Pertinent History: HTN, Hyperlipidemia, CAD, CVA, PVD and H/O PFO repair,                    Bradycardia, ABN EKG. Study Detail: The following Echo studies were performed: 2D, M-Mode, Doppler and               color flow.  PHYSICIAN INTERPRETATION: Left Ventricle: The left ventricular systolic function is normal, with a visually estimated ejection fraction of 55-60%. There are no regional wall motion abnormalities. The left ventricular cavity size is normal. There is normal septal and normal posterior left ventricular wall thickness. There is left ventricular concentric remodeling. Spectral Doppler shows a Grade I (impaired relaxation pattern) of left ventricular diastolic filling with normal left atrial filling pressure. Left Atrium: The left atrium is mildly dilated. Right Ventricle: The right ventricle is normal in size. There is normal right ventricular global systolic function. Right Atrium: The right atrium is normal in size. Aortic Valve: The aortic valve is trileaflet. There is no  evidence of aortic valve regurgitation. The peak instantaneous gradient of the aortic valve is 5 mmHg. Mitral Valve: The mitral valve is normal in structure. There is trace mitral valve regurgitation. Tricuspid Valve: The tricuspid valve is structurally normal. There is trace tricuspid regurgitation. The right ventricular systolic pressure is unable to be estimated. Pulmonic Valve: The pulmonic valve is structurally normal. There is physiologic pulmonic valve regurgitation. Pericardium: No pericardial effusion noted. Aorta: The aortic root is normal.  CONCLUSIONS:  1. Poorly visualized anatomical structures due to suboptimal image quality.  2. The left ventricular systolic function is normal, with a visually estimated ejection fraction of 55-60%.  3. Spectral Doppler shows a Grade I (impaired relaxation pattern) of left ventricular diastolic filling with normal left atrial filling pressure.  4. There is normal right ventricular global systolic function.  5. Trace mitral valve regurgitation.  6. Trace tricuspid regurgitation is visualized. QUANTITATIVE DATA SUMMARY:  2D MEASUREMENTS:           Normal Ranges: LAs:             2.90 cm   (2.7-4.0cm) IVSd:            0.94 cm   (0.6-1.1cm) LVPWd:           0.97 cm   (0.6-1.1cm) LVIDd:           3.49 cm   (3.9-5.9cm) LVIDs:           2.60 cm LV Mass Index:   38.7 g/m2 LV % FS          25.5 %  LA VOLUME:                    Normal Ranges: LA Vol A4C:        63.8 ml    (22+/-6mL/m2) LA Vol A2C:        70.4 ml LA Vol BP:         74.6 ml LA Vol Index A4C:  25.5ml/m2 LA Vol Index A2C:  28.1 ml/m2 LA Vol Index BP:   29.8 ml/m2 LA Area A4C:       18.9 cm2 LA Area A2C:       22.1 cm2 LA Major Axis A4C: 4.8 cm LA Major Axis A2C: 5.9 cm LA Volume Index:   27.9 ml/m2 LA Vol A4C:        57.9 ml LA Vol A2C:        68.2 ml LA Vol Index BSA:  25.2 ml/m2  RA VOLUME BY A/L METHOD:          Normal Ranges: RA Area A4C:             12.5 cm2  M-MODE MEASUREMENTS:         Normal Ranges: Ao Root:              3.20 cm (2.0-3.7cm)  AORTA MEASUREMENTS:         Normal Ranges: Asc Ao, d:          2.90 cm (2.1-3.4cm)  LV SYSTOLIC FUNCTION BY 2D PLANIMETRY (MOD):                      Normal Ranges: EF-A4C View:    58 % (>=55%) EF-A2C View:    52 % EF-Biplane:     57 % EF-Visual:      58 % LV EF Reported: 58 %  LV DIASTOLIC FUNCTION:           Normal Ranges: MV Peak E:             0.54 m/s  (0.7-1.2 m/s) MV Peak A:             0.96 m/s  (0.42-0.7 m/s) E/A Ratio:             0.57      (1.0-2.2) MV e'                  0.047 m/s (>8.0) MV lateral e'          0.06 m/s MV medial e'           0.03 m/s E/e' Ratio:            11.48     (<8.0)  MITRAL VALVE:          Normal Ranges: MV DT:        263 msec (150-240msec)  AORTIC VALVE:           Normal Ranges: AoV Vmax:      1.07 m/s (<=1.7m/s) AoV Peak P.6 mmHg (<20mmHg) LVOT Max Jarrett:  0.91 m/s (<=1.1m/s) LVOT Diameter: 2.30 cm  (1.8-2.4cm) AoV Area,Vmax: 3.53 cm2 (2.5-4.5cm2)  RIGHT VENTRICLE: RV Basal 3.50 cm RV Mid   1.34 cm RV Major 7.5 cm TAPSE:   17.8 mm RV s'    0.13 m/s  TRICUSPID VALVE/RVSP:          Normal Ranges: Peak TR Velocity:     2.02 m/s RV Syst Pressure:     19 mmHg  (< 30mmHg)  [id number] Camila Olivarez MD Electronically signed on 2025 at 9:32:17 AM  ** Final **     US thyroid    Result Date: 2025  Interpreted By:  Jose Sanders, STUDY: US THYROID; 2025 7:35 pm   INDICATION: Signs/Symptoms:Multinodular Thyroid on CT.   COMPARISON: None.   ACCESSION NUMBER(S): [id number]   ORDERING CLINICIAN: SHAKIR GLOVATSKAYA   TECHNIQUE: Grayscale and color Doppler imaging of the thyroid gland was performed.   FINDINGS: There is a diffusely heterogeneous appearance of the echo pattern throughout the right and left lobes of the thyroid gland with no normal-appearing thyroid tissue identified. Multiple small cysts are identified bilaterally.   Right lobe of the thyroid: 5.5 cm x 2.8 cm x 2.5 cm..   Left lobe of the thyroid: 4.8 cm x 2.3 cm x 1.9 cm. There are  2 similar-appearing small nodules within left lobe of the thyroid gland midpole, wider than tall, smooth well-defined margins, isoechoic in appearance without microcalcifications measuring 7 mm and 5 mm. These are TI-RADS category 3..   Thyroid isthmus: 1.2 cm..       Diffusely heterogeneous appearance of the thyroid tissue bilaterally with no normal thyroid tissue identified. There are 2 nodules on the left each with a TI-RADS category 3. No specific follow-up is recommended based on TI-RADS category 3 and less than 1 cm in size.   Signed by: Jose Sanders 1/21/2025 8:04 AM Dictation workstation:   [id number]    CT angio head and neck w and wo IV contrast    Result Date: 1/20/2025  Interpreted By:  Mateusz Wright, STUDY: CT ANGIO HEAD AND NECK W AND WO IV CONTRAST;  1/20/2025 5:12 pm   INDICATION: Signs/Symptoms:Brain attack.     COMPARISON: CT head today.   ACCESSION NUMBER(S): [id number]   ORDERING CLINICIAN: SHAKIR WHALEY   TECHNIQUE: 75 ML of Omnipaque 350 was administered intravenously and axial images of the head and neck were acquired.  Coronal, sagittal, and 3-D reconstructions were provided for review.   FINDINGS:   CTA COW: No major vascular occlusion. Abrupt cut off flow related enhancement A2 segment left anterior cerebral artery, suspected thrombosis, with faint distal reconstitution. Moderate short-segment stenosis of the A2 segment right anterior cerebral artery. No aneurysms.  No vascular malformations.   CTA CAROTID: No aortic aneurysm or dissection. No significant stenoses at the origins of the great vessels from the aortic arch. No significant stenoses of the brachycephalic artery or bilateral subclavian arteries.   No significant stenoses bilateral carotid arterial systems. Luminal irregularity of the proximal bilateral cervical internal carotid artery suspected be related to fibrofatty plaque rather than connective tissue disorder.   Severe short-segment stenosis V1 segment right  vertebral artery. Moderate short-segment stenosis of the V1 segment of the left vertebral artery.   NONVASCULAR NECK: No soft tissue mass. No adenopathy. Salivary glands are unremarkable. Multinodular thyroid. Bones intact.         1. No LVO. Abrupt cut off flow related enhancement A2 segment left anterior cerebral artery, suspected thrombosis, with faint distal reconstitution. 2. Severe short-segment stenosis V1 segment right vertebral artery. Moderate short-segment stenosis of the V1 segment of the left vertebral artery. Suspect atherosclerotic involvement though thrombosis not excluded. 3. Multinodular thyroid. Suggest correlation with thyroid ultrasound to assess for possible neoplasm.   MACRO: None   Signed by: Mateusz Wright 1/20/2025 6:41 PM Dictation workstation:   [id number]    CT head wo IV contrast    Result Date: 1/20/2025  Interpreted By:  Maria Guadalupe Barragan, STUDY: CT HEAD WO IV CONTRAST;  1/20/2025 2:48 pm   INDICATION: Signs/Symptoms:altered mental status.  Generalized weakness     COMPARISON: 01/18/2025   ACCESSION NUMBER(S): [id number]   ORDERING CLINICIAN: WERNER JEWELL   TECHNIQUE: Noncontrast axial CT scan of head was performed. Angled reformats in brain and bone windows were generated. The images were reviewed in bone, brain, blood and soft tissue windows.   FINDINGS: CSF Spaces: There is ventricular enlargement with proportionate deepening and widening of the sulci and the sylvian fissures.. There is no extraaxial fluid collection.   Parenchyma: There are areas of diminished density seen within the white matter of each cerebral hemisphere secondary to chronic microvascular ischemic disease. There is an old lacunar infarct within the right basal ganglia extending into the corona radiata with old lacunar infarct of the left corona radiata noted. The grey-white differentiation is intact. There is no mass effect or midline shift. There is no intracranial hemorrhage.   Calvarium: The calvarium is  unremarkable.   Paranasal sinuses and mastoids: There is some fluid identified within left sphenoid sinus. The remaining visualized paranasal sinuses are clear.       Atrophy and chronic microvascular ischemic disease with old lacunar infarcts seen bilaterally within the corona radiata.   No acute intracranial process.   No evidence of intracranial hemorrhage or displaced skull fracture.   MACRO: None     Signed by: Maria Guadalupe Barragan 1/20/2025 3:06 PM Dictation workstation:   [id number]           Results for orders placed or performed during the hospital encounter of 01/18/25 (from the past 24 hours)   POCT GLUCOSE   Result Value Ref Range    POCT Glucose 192 (H) 74 - 99 mg/dL   POCT GLUCOSE   Result Value Ref Range    POCT Glucose 216 (H) 74 - 99 mg/dL   POCT GLUCOSE   Result Value Ref Range    POCT Glucose 213 (H) 74 - 99 mg/dL   Basic Metabolic Panel   Result Value Ref Range    Glucose 155 (H) 74 - 99 mg/dL    Sodium 137 136 - 145 mmol/L    Potassium 4.5 3.5 - 5.3 mmol/L    Chloride 108 (H) 98 - 107 mmol/L    Bicarbonate 25 21 - 32 mmol/L    Anion Gap 9 (L) 10 - 20 mmol/L    Urea Nitrogen 25 (H) 6 - 23 mg/dL    Creatinine 2.04 (H) 0.50 - 1.30 mg/dL    eGFR 34 (L) >60 mL/min/1.73m*2    Calcium 7.7 (L) 8.6 - 10.3 mg/dL   POCT GLUCOSE   Result Value Ref Range    POCT Glucose 156 (H) 74 - 99 mg/dL      Assessment/Plan                  Assessment & Plan  Generalized weakness  -reviewing records from 3 years ago, he also has a history of necrotizing myopathy which required immunotherapy   -adding on CK just in case  -TSH normal, cortisol ordered for morning draw  -PT/OT  Cognitive change  -No acute findings on CT head, though there is microvascular ischemic damage and old infarcts  -neurology consulted for evaluation  Type 1 diabetes mellitus  -Reports 45u long acting at night and 16u prandial   -start 30u lantus, 8u prandial, and low dose SSI, adjust as needed      01/20/25:  Rhabdomyolysis due to Statin? CK level is  improving.  Cortisol level WNL    Acute-on-chronic renal failure, improving.  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  DM1, cont. Insulin    Deconditioning - PT/OT. Agreeable to rehab.    1/21/25:    Syncopal episode yesterday with bradycardia on the monitor (mobitz II vs complete AV block), improved with Atropine. Last dose on Labetalol 100 mg PO was yesterday at 8:35am.  Cardiology consulted, they will discuss with EP about a transfer to [facility] for a pacemaker placement.    Transient stroke-like picture with transient Rt-sided hemiplegia. CTA of the brain demonstrated  LMCA stenosis, likely caused symptoms in the setting of bradycardia. Continue ASA/Plavix. MRI of the brain today Acute/subacute stroke, no hemorrhagic transformation.    Pt CAN NOT have statins due to rhabdomyolysis.    Rhabdomyolysis, resolving with hydration.    ARF, improved. Creatinine is worse today, likely due to bradycardia(low perfusion) and IV dye. Monitor.    HTN. Will keep BP on  a higher side to avoid neuro- compromise. Stop Labetalol (last dose yest. At 8:35am)       Discussed with Pt's sister.    1/22/25   patient remained stable from neurological standpoint.:  Monitor strips reviewed.  Patient still has episodes of 2-1 AV block.  Blood pressure remained stable.    Rhabdomyolysis, improved with hydration.  Patient cannot be on statin.  History of necrotizing myositis in the past    Acute stroke.  Continue aspirin and Plavix.  Patient has intracerebral stenosis.  According to telestroke neurologist, bradycardia likely precipitated the stroke given intracerebral stenosis, causing hypoperfusion.     Bradycardia, transient 2:1 conduction transient 2-1 AV block.  Discussed with electrophysiology.  They feel that patient would benefit from a pacemaker.  Will initiate transfer to [facility] so patient can have pacemaker placed.  Acute renal failure, worsening likely due to exposure to IV dye.  Will consult  nephrology  Hypertension.  Currently, trying to keep on the higher side due to recent stroke.  Labetalol was discontinued due to bradycardia.    CODE STATUS, I was trying to discussed with the patient but he does not comprehend the whole concept of  Code status. Will discuss with patient and his sister when she visits.   Discussed with hospitalist at , Dr. Lambert. Pt has been accepted to  SDU.    Kamila Malin MD

## 2025-01-22 NOTE — PROGRESS NOTES
Subjective Data:  Patient lying comfortable in bed.  Poor historian.  Denies having chest pain    Overnight Events:    Telemetry showed few episodes of 2-1 AV block short duration no complete heart block.     Objective Data:  Last Recorded Vitals:  Vitals:    01/21/25 2014 01/22/25 0048 01/22/25 0441 01/22/25 0739   BP: 161/82 175/69 166/62 162/61   BP Location: Left arm Left arm Left arm Left arm   Patient Position: Lying Lying Lying Lying   Pulse: 91 77 67 69   Resp: 21 13 (!) 9 13   Temp: 36.4 °C (97.5 °F) 36.4 °C (97.5 °F) 36 °C (96.8 °F) 36.6 °C (97.9 °F)   TempSrc: Temporal Temporal Temporal Temporal   SpO2: 97% 99% 98% 99%   Weight:   123 kg (271 lb)    Height:           Last Labs:  CBC - 1/18/2025:  8:17 PM  8.8 14.5 179    44.1      CMP - 1/22/2025:  4:25 AM  7.7 8.1 27 --- 0.4   _ 3.6 18 63      PTT - No results in last year.  _   _ _     TROPHS   Date/Time Value Ref Range Status   01/20/2025 03:00 PM 14 0 - 20 ng/L Final   01/18/2025 09:15 PM 16 0 - 20 ng/L Final   01/18/2025 08:17 PM 15 0 - 20 ng/L Final     BNP   Date/Time Value Ref Range Status   09/23/2021 02:04 PM 18 0 - 99 pg/mL Final     Comment:     .  <100 pg/mL - Heart failure unlikely  100-299 pg/mL - Intermediate probability of acute heart  .               failure exacerbation. Correlate with clinical  .               context and patient history.    >=300 pg/mL - Heart Failure likely. Correlate with clinical  .               context and patient history.  BNP testing is performed using different testing   methodology at [facility] than at other   [facility] hospitals. Direct result comparisons should   only be made within the same method.       HGBA1C   Date/Time Value Ref Range Status   06/21/2023 05:12 PM 7.3 4.0 - 6.0 % Final     Comment:     Hemoglobin A1C levels are related to mean blood glucose during the   preceding 2-3 months. The relationship table below may be used as a   general guide. Each 1% increase in HGB A1C is a  reflection of an   increase in mean glucose of approximately 30 mg/dl.   Reference: Diabetes Care, volume 29, supplement 1 Jan. 2006                        HGB A1C ................. Approx. Mean Glucose   _______________________________________________   6%   ...............................  120 mg/dl   7%   ...............................  150 mg/dl   8%   ...............................  180 mg/dl   9%   ...............................  210 mg/dl   10%  ...............................  240 mg/dl  Performed at [address] [address]     01/06/2023 04:48 AM 7.9 4.0 - 6.0 % Final     Comment:     Hemoglobin A1C levels are related to mean blood glucose during the   preceding 2-3 months. The relationship table below may be used as a   general guide. Each 1% increase in HGB A1C is a reflection of an   increase in mean glucose of approximately 30 mg/dl.   Reference: Diabetes Care, volume 29, supplement 1 Jan. 2006                        HGB A1C ................. Approx. Mean Glucose   _______________________________________________   6%   ...............................  120 mg/dl   7%   ...............................  150 mg/dl   8%   ...............................  180 mg/dl   9%   ...............................  210 mg/dl   10%  ...............................  240 mg/dl  Performed at [address] [address]       LDLCALC   Date/Time Value Ref Range Status   06/21/2023 05:12  65 - 130 MG/DL Final   12/23/2022 05:33 AM 93 65 - 130 MG/DL Final   11/29/2021 10:46  65 - 130 MG/DL Final      Last I/O:  I/O last 3 completed shifts:  In: - (0 mL/kg)   Out: 550 (4.5 mL/kg) [Urine:550 (0.1 mL/kg/hr)]  Weight: 122.9 kg     Past Cardiology Tests (Last 3 Years):  EKG:  Electrocardiogram, 12-lead PRN ACS symptoms 01/20/2025      ECG 12 lead 01/18/2025    Echo:  Transthoracic Echo (TTE) Complete 01/21/2025    Ejection Fractions:  EF   Date/Time Value Ref Range Status    01/21/2025 09:30 AM 58 %      Cath:  No results found for this or any previous visit from the past 1095 days.    Stress Test:  No results found for this or any previous visit from the past 1095 days.    Cardiac Imaging:  No results found for this or any previous visit from the past 1095 days.      Inpatient Medications:  Scheduled medications   Medication Dose Route Frequency    aspirin  81 mg oral Daily    clopidogrel  75 mg oral Daily    docusate sodium  100 mg oral Daily    finasteride  5 mg oral Daily    insulin glargine  30 Units subcutaneous Nightly    insulin lispro  0-5 Units subcutaneous TID AC    insulin lispro  8 Units subcutaneous TID AC    losartan  100 mg oral Daily    tamsulosin  0.4 mg oral Daily     PRN medications   Medication    acetaminophen    dextrose    dextrose    glucagon    melatonin    sennosides-docusate sodium     Continuous Medications   Medication Dose Last Rate       Physical Exam:  General: Patient is in no acute distress.  HEENT: atraumatic normocephalic.  Neck: is supple jugular venous pressure within normal limits no thyromegaly.  Cardiovascular regular rate and rhythm normal heart sounds no murmurs rubs or gallops.  Lungs: clear to auscultation bilaterally.  Abdomen: is soft nontender.  Extremities warm to touch no edema.  Neurologic examination: patient is awake alert confused.    Psychiatric examination: patient for insight  Pulses 2+ intact bilaterally        Assessment/Plan  #1 weakness and syncope.  Patient admitted to the hospital as workup for weakness for several days and syncope .  Patient is not a great historian.  Echocardiogram showed normal ejection fraction no major valve abnormalities.  Telemetry showing episodes of bradycardia possible complete heart block at least during the episode of confusion and syncope that he had in the hospital.  EKG showed possible A-V dissociation with nonspecific interventricular conduction delay.  Reviewed telemetry overnight very  few episodes of 2-1 AV block for few seconds.  No advanced heart block.  Await EP input.  Keep holding labetalol.  Of note patient history PFO/ASD patch repair in 2014.    2.  Hypertension.  Patient currently hypertensive.  Labetalol on hold.  On losartan.  If he remains hypertensive we will add amlodipine.  Undergoing MRI of the brain to rule out CVA.  Await result for further recommendation.     3.  Altered mental status secondary to new CVA.  Patient currently on aspirin clopidogrel.  Intolerant to statin due to history of myositis/rhabdomyolysis.  Baseline elevated CK.  He probably would benefit of PCSK9 inhibitors or Leqvio which we will arrange for as an outpatient.     4.  Hyperlipidemia continue high intensity statin.  Outpatient monitoring of lipid panel.  Peripheral IV 01/18/25 20 G Left;Anterior Forearm (Active)   Site Assessment Clean;Dry;Intact 01/22/25 0900   Dressing Status Clean;Dry;Occlusive 01/22/25 0900   Number of days: 4       Code Status:  No Order      Camila Olivarez MD

## 2025-01-22 NOTE — PROGRESS NOTES
"Occupational Therapy    Occupational Therapy Treatment    Name: Tony King  MRN: [id number]  Department: [address]  Room: [address] [place] [address]  Date: 01/22/25  Time Calculation  Start Time: 0955  Stop Time: 1017  Time Calculation (min): 22 min    Assessment:  Evaluation/Treatment Tolerance: Patient limited by fatigue  Medical Staff Made Aware: Yes  End of Session Communication: Bedside nurse  End of Session Patient Position: Up in chair, Alarm on  Plan:  Treatment Interventions: ADL retraining, Functional transfer training, UE strengthening/ROM, Endurance training, Cognitive reorientation, Patient/family training, Equipment evaluation/education, Neuromuscular reeducation, Compensatory technique education  OT Frequency: 4 times per week  OT Discharge Recommendations: High intensity level of continued care  Equipment Recommended upon Discharge: Lift  OT Recommended Transfer Status: Assist of 2  OT - OK to Discharge: Yes    Subjective   Previous Visit Info:  OT Last Visit  OT Received On: 01/22/25  General:  General  Co-Treatment: PT  Prior to Session Communication: Bedside nurse  Patient Position Received: Bed, 3 rail up, Alarm on  Preferred Learning Style: visual, verbal  General Comment: Pt transferred to step-down on 1/20/25 post rapid response for episode of bradycardia and possible A-V dissociation. Cardiology and neurology consults. per cardiology, awaiting EP input, maintain medical management. MRI on 1/21/25, \"acute or subacute infarcts L PAULIE territory.\"  Precautions:Falls, CVA left affect, aphasia        Date/Time Vitals Session Patient Position Pulse Resp SpO2 BP MAP (mmHg)    01/22/25 0955 --  --  --  --  98 %  147/64  --                Pain Assessment:  Pain Assessment  Pain Assessment: 0-10  0-10 (Numeric) Pain Score: 0 - No pain     Objective   Cognition:  Overall Cognitive Status: Impaired  Processing Speed: Delayed (1 step cues)  Activities of Daily Living: LE Dressing  LE Dressing: Yes  LE Dressing Adaptive " Equipment: Reacher, Sock aide  Pants Level of Assistance: Maximum assistance  LE Dressing Where Assessed: Edge of bed  LE Dressing Comments: pt stated he has reacher and sock aide and kows how to tuse them       Bed Mobility/Transfers: Bed Mobility 1  Bed Mobility 1: Supine to sitting  Level of Assistance 1: Maximum assistance, +2  Bed Mobility Comments 1: verbal, tactile cues for use of grab bar, slow processing    Transfer 1  Transfer From 1: Sit to  Transfer to 1: Stand  Technique 1: Sit to stand, Stand to sit  Transfer Device 1: Walker  Transfer Level of Assistance 1: Moderate assistance, +2  Transfers 2  Transfer From 2: Stand to  Transfer to 2: Chair with arms  Technique 2: To left  Transfer Device 2: Walker, Gait belt  Transfer Level of Assistance 2: Moderate assistance, +2  Trials/Comments 2: max verbal cues for hamd placement. retropulsive  Therapy/Activity: Therapeutic Exercise  Therapeutic Exercise Performed: Yes  Therapeutic Exercise Activity 1: UB HEP  Therapeutic Exercise Activity 2: educated pt in supine AAROM, verbalized understanding.  IP THER EXER PROGRAM -Written copy provided as HEP  1x10 bilaterally  Shoulder flexion  Shoulder abduction  Horizontal abduction  Bicep curls  FA supination/pronation  Tricep presses  Outcome Measures:  [facility] Daily Activity  Putting on and taking off regular lower body clothing: A lot  Bathing (including washing, rinsing, drying): A lot  Putting on and taking off regular upper body clothing: A lot  Toileting, which includes using toilet, bedpan or urinal: A lot  Taking care of personal grooming such as brushing teeth: A little  Eating Meals: A little  Daily Activity - Total Score: 14        Education Documentation  Handouts, taught by JACKELYN Phan at 1/22/2025 10:27 AM.  Learner: Patient  Readiness: Acceptance  Method: Explanation, Demonstration, Handout  Response: Needs Reinforcement    Body Mechanics, taught by JACKELYN Phan at 1/22/2025 10:27  AM.  Learner: Patient  Readiness: Acceptance  Method: Explanation, Demonstration, Handout  Response: Needs Reinforcement    Precautions, taught by JACKELYN Phan at 1/22/2025 10:27 AM.  Learner: Patient  Readiness: Acceptance  Method: Explanation, Demonstration, Handout  Response: Needs Reinforcement    Home Exercise Program, taught by JACKELYN Phan at 1/22/2025 10:27 AM.  Learner: Patient  Readiness: Acceptance  Method: Explanation, Demonstration, Handout  Response: Needs Reinforcement    ADL Training, taught by JACKELYN Phan at 1/22/2025 10:27 AM.  Learner: Patient  Readiness: Acceptance  Method: Explanation, Demonstration, Handout  Response: Needs Reinforcement    Education Comments  No comments found.      Goals:  Encounter Problems       Encounter Problems (Active)       OT Goals       ADL's (Progressing)       Start:  01/20/25    Expected End:  02/03/25       Patient will complete ADL tasks, with minimal assist  using AE need in order to increase patient's safety and independence with self-care tasks.           Functional mobility (Progressing)       Start:  01/20/25    Expected End:  02/03/25       Patient will complete functional transfers with minimal assist  using least restrictive device, in order to increase patient's safety and independence with daily tasks.           Activity tolerance (Progressing)       Start:  01/20/25    Expected End:  02/03/25       Patient will demonstrate the ability to participate in functional activity at least >/=  minutes in order to increase patient's safety and independence with daily tasks.

## 2025-01-22 NOTE — CARE PLAN
Problem: Pain - Adult  Goal: Verbalizes/displays adequate comfort level or baseline comfort level  Outcome: Progressing     Problem: Safety - Adult  Goal: Free from fall injury  Outcome: Progressing     Problem: Discharge Planning  Goal: Discharge to home or other facility with appropriate resources  Outcome: Progressing     Problem: Chronic Conditions and Co-morbidities  Goal: Patient's chronic conditions and co-morbidity symptoms are monitored and maintained or improved  Outcome: Progressing     Problem: Fall/Injury  Goal: Not fall by end of shift  Outcome: Progressing  Goal: Be free from injury by end of the shift  Outcome: Progressing  Goal: Verbalize understanding of personal risk factors for fall in the hospital  Outcome: Progressing  Goal: Verbalize understanding of risk factor reduction measures to prevent injury from fall in the home  Outcome: Progressing  Goal: Use assistive devices by end of the shift  Outcome: Progressing  Goal: Pace activities to prevent fatigue by end of the shift  Outcome: Progressing     Problem: Skin  Goal: Decreased wound size/increased tissue granulation at next dressing change  Outcome: Progressing  Goal: Participates in plan/prevention/treatment measures  Outcome: Progressing  Flowsheets (Taken 1/22/2025 0201)  Participates in plan/prevention/treatment measures:   Discuss with provider PT/OT consult   Increase activity/out of bed for meals  Goal: Prevent/manage excess moisture  Outcome: Progressing  Flowsheets (Taken 1/22/2025 0201)  Prevent/manage excess moisture: Cleanse incontinence/protect with barrier cream  Goal: Prevent/minimize sheer/friction injuries  Outcome: Progressing  Flowsheets (Taken 1/22/2025 0201)  Prevent/minimize sheer/friction injuries:   Increase activity/out of bed for meals   Use pull sheet   HOB 30 degrees or less   Turn/reposition every 2 hours/use positioning/transfer devices   Utilize specialty bed per algorithm  Goal: Promote/optimize  nutrition  Outcome: Progressing  Goal: Promote skin healing  Outcome: Progressing   The patient's goals for the shift include      The clinical goals for the shift include Patient will remain hemodynamically stable and get rest throughout shift.

## 2025-01-23 LAB
EST. AVERAGE GLUCOSE BLD GHB EST-MCNC: 186 MG/DL
GLUCOSE BLD MANUAL STRIP-MCNC: 152 MG/DL (ref 74–99)
GLUCOSE BLD MANUAL STRIP-MCNC: 155 MG/DL (ref 74–99)
GLUCOSE BLD MANUAL STRIP-MCNC: 160 MG/DL (ref 74–99)
GLUCOSE BLD MANUAL STRIP-MCNC: 169 MG/DL (ref 74–99)
GLUCOSE BLD MANUAL STRIP-MCNC: 212 MG/DL (ref 74–99)
HBA1C MFR BLD: 8.1 %

## 2025-01-23 PROCEDURE — 2500000004 HC RX 250 GENERAL PHARMACY W/ HCPCS (ALT 636 FOR OP/ED): Performed by: INTERNAL MEDICINE

## 2025-01-23 PROCEDURE — 2720000007 HC OR 272 NO HCPCS: Performed by: INTERNAL MEDICINE

## 2025-01-23 PROCEDURE — 2500000004 HC RX 250 GENERAL PHARMACY W/ HCPCS (ALT 636 FOR OP/ED): Performed by: REGISTERED NURSE

## 2025-01-23 PROCEDURE — 2500000001 HC RX 250 WO HCPCS SELF ADMINISTERED DRUGS (ALT 637 FOR MEDICARE OP): Performed by: INTERNAL MEDICINE

## 2025-01-23 PROCEDURE — 2500000002 HC RX 250 W HCPCS SELF ADMINISTERED DRUGS (ALT 637 FOR MEDICARE OP, ALT 636 FOR OP/ED): Performed by: INTERNAL MEDICINE

## 2025-01-23 PROCEDURE — 99222 1ST HOSP IP/OBS MODERATE 55: CPT | Performed by: REGISTERED NURSE

## 2025-01-23 PROCEDURE — 2060000001 HC INTERMEDIATE ICU ROOM DAILY

## 2025-01-23 PROCEDURE — 2500000001 HC RX 250 WO HCPCS SELF ADMINISTERED DRUGS (ALT 637 FOR MEDICARE OP): Performed by: REGISTERED NURSE

## 2025-01-23 PROCEDURE — 02HK3JZ INSERTION OF PACEMAKER LEAD INTO RIGHT VENTRICLE, PERCUTANEOUS APPROACH: ICD-10-PCS | Performed by: INTERNAL MEDICINE

## 2025-01-23 PROCEDURE — C1894 INTRO/SHEATH, NON-LASER: HCPCS | Performed by: INTERNAL MEDICINE

## 2025-01-23 PROCEDURE — 99233 SBSQ HOSP IP/OBS HIGH 50: CPT | Performed by: INTERNAL MEDICINE

## 2025-01-23 PROCEDURE — 99152 MOD SED SAME PHYS/QHP 5/>YRS: CPT | Performed by: INTERNAL MEDICINE

## 2025-01-23 PROCEDURE — 2750000001 HC OR 275 NO HCPCS: Performed by: INTERNAL MEDICINE

## 2025-01-23 PROCEDURE — 02H63JZ INSERTION OF PACEMAKER LEAD INTO RIGHT ATRIUM, PERCUTANEOUS APPROACH: ICD-10-PCS | Performed by: INTERNAL MEDICINE

## 2025-01-23 PROCEDURE — 99153 MOD SED SAME PHYS/QHP EA: CPT | Performed by: INTERNAL MEDICINE

## 2025-01-23 PROCEDURE — 33208 INSRT HEART PM ATRIAL & VENT: CPT | Performed by: INTERNAL MEDICINE

## 2025-01-23 PROCEDURE — C1892 INTRO/SHEATH,FIXED,PEEL-AWAY: HCPCS | Performed by: INTERNAL MEDICINE

## 2025-01-23 PROCEDURE — 82947 ASSAY GLUCOSE BLOOD QUANT: CPT

## 2025-01-23 PROCEDURE — C1889 IMPLANT/INSERT DEVICE, NOC: HCPCS | Performed by: INTERNAL MEDICINE

## 2025-01-23 PROCEDURE — 3E0102A INTRODUCTION OF ANTI-INFECTIVE ENVELOPE INTO SUBCUTANEOUS TISSUE, OPEN APPROACH: ICD-10-PCS | Performed by: INTERNAL MEDICINE

## 2025-01-23 PROCEDURE — 0JH606Z INSERTION OF PACEMAKER, DUAL CHAMBER INTO CHEST SUBCUTANEOUS TISSUE AND FASCIA, OPEN APPROACH: ICD-10-PCS | Performed by: INTERNAL MEDICINE

## 2025-01-23 PROCEDURE — C1898 LEAD, PMKR, OTHER THAN TRANS: HCPCS | Performed by: INTERNAL MEDICINE

## 2025-01-23 PROCEDURE — C1785 PMKR, DUAL, RATE-RESP: HCPCS | Performed by: INTERNAL MEDICINE

## 2025-01-23 PROCEDURE — 2500000005 HC RX 250 GENERAL PHARMACY W/O HCPCS: Performed by: REGISTERED NURSE

## 2025-01-23 DEVICE — ENVELOPE CMRM6122 ABSORB MED US
Type: IMPLANTABLE DEVICE | Site: CHEST  WALL | Status: FUNCTIONAL
Brand: TYRX™ ABSORBABLE ANTIBACTERIAL ENVELOPE - MEDIUM

## 2025-01-23 DEVICE — DR PACEMAKER DDDR
Type: IMPLANTABLE DEVICE | Site: CHEST  WALL | Status: FUNCTIONAL
Brand: ASSURITY MRI™

## 2025-01-23 RX ORDER — FENTANYL CITRATE 50 UG/ML
INJECTION, SOLUTION INTRAMUSCULAR; INTRAVENOUS AS NEEDED
Status: DISCONTINUED | OUTPATIENT
Start: 2025-01-23 | End: 2025-01-23 | Stop reason: HOSPADM

## 2025-01-23 RX ORDER — METOPROLOL SUCCINATE 50 MG/1
50 TABLET, EXTENDED RELEASE ORAL 2 TIMES DAILY
Status: DISCONTINUED | OUTPATIENT
Start: 2025-01-23 | End: 2025-01-31 | Stop reason: HOSPADM

## 2025-01-23 RX ORDER — LIDOCAINE HYDROCHLORIDE 10 MG/ML
INJECTION, SOLUTION EPIDURAL; INFILTRATION; INTRACAUDAL; PERINEURAL AS NEEDED
Status: DISCONTINUED | OUTPATIENT
Start: 2025-01-23 | End: 2025-01-23 | Stop reason: HOSPADM

## 2025-01-23 RX ORDER — INSULIN GLARGINE 100 [IU]/ML
38 INJECTION, SOLUTION SUBCUTANEOUS EVERY 24 HOURS
Status: DISCONTINUED | OUTPATIENT
Start: 2025-01-23 | End: 2025-01-31 | Stop reason: HOSPADM

## 2025-01-23 RX ORDER — MIDAZOLAM HYDROCHLORIDE 1 MG/ML
INJECTION, SOLUTION INTRAMUSCULAR; INTRAVENOUS AS NEEDED
Status: DISCONTINUED | OUTPATIENT
Start: 2025-01-23 | End: 2025-01-23 | Stop reason: HOSPADM

## 2025-01-23 RX ORDER — VANCOMYCIN 1 G/200ML
1000 INJECTION, SOLUTION INTRAVENOUS ONCE
Status: COMPLETED | OUTPATIENT
Start: 2025-01-23 | End: 2025-01-23

## 2025-01-23 RX ADMIN — METOPROLOL SUCCINATE 50 MG: 50 TABLET, EXTENDED RELEASE ORAL at 20:59

## 2025-01-23 RX ADMIN — AMLODIPINE BESYLATE 2.5 MG: 2.5 TABLET ORAL at 08:12

## 2025-01-23 RX ADMIN — ASPIRIN 81 MG: 81 TABLET, CHEWABLE ORAL at 08:12

## 2025-01-23 RX ADMIN — TAMSULOSIN HYDROCHLORIDE 0.4 MG: 0.4 CAPSULE ORAL at 08:12

## 2025-01-23 RX ADMIN — VANCOMYCIN HYDROCHLORIDE 1000 MG: 5 INJECTION, POWDER, LYOPHILIZED, FOR SOLUTION INTRAVENOUS at 10:53

## 2025-01-23 RX ADMIN — FINASTERIDE 5 MG: 5 TABLET, FILM COATED ORAL at 08:12

## 2025-01-23 RX ADMIN — FAMOTIDINE 20 MG: 20 TABLET, FILM COATED ORAL at 08:12

## 2025-01-23 RX ADMIN — CLOPIDOGREL BISULFATE 75 MG: 75 TABLET ORAL at 08:12

## 2025-01-23 RX ADMIN — INSULIN GLARGINE 38 UNITS: 100 INJECTION, SOLUTION SUBCUTANEOUS at 20:59

## 2025-01-23 ASSESSMENT — COGNITIVE AND FUNCTIONAL STATUS - GENERAL
DRESSING REGULAR UPPER BODY CLOTHING: TOTAL
DAILY ACTIVITIY SCORE: 18
DRESSING REGULAR LOWER BODY CLOTHING: TOTAL
DRESSING REGULAR UPPER BODY CLOTHING: A LITTLE
PERSONAL GROOMING: TOTAL
TURNING FROM BACK TO SIDE WHILE IN FLAT BAD: A LITTLE
TURNING FROM BACK TO SIDE WHILE IN FLAT BAD: TOTAL
TOILETING: A LITTLE
EATING MEALS: A LITTLE
CLIMB 3 TO 5 STEPS WITH RAILING: TOTAL
MOVING TO AND FROM BED TO CHAIR: A LITTLE
MOBILITY SCORE: 18
MOVING FROM LYING ON BACK TO SITTING ON SIDE OF FLAT BED WITH BEDRAILS: TOTAL
HELP NEEDED FOR BATHING: TOTAL
STANDING UP FROM CHAIR USING ARMS: A LITTLE
EATING MEALS: TOTAL
TOILETING: TOTAL
WALKING IN HOSPITAL ROOM: TOTAL
MOBILITY SCORE: 6
STANDING UP FROM CHAIR USING ARMS: TOTAL
DRESSING REGULAR LOWER BODY CLOTHING: A LITTLE
CLIMB 3 TO 5 STEPS WITH RAILING: A LITTLE
DAILY ACTIVITIY SCORE: 6
PERSONAL GROOMING: A LITTLE
MOVING TO AND FROM BED TO CHAIR: TOTAL
HELP NEEDED FOR BATHING: A LITTLE
WALKING IN HOSPITAL ROOM: A LITTLE
MOVING FROM LYING ON BACK TO SITTING ON SIDE OF FLAT BED WITH BEDRAILS: A LITTLE

## 2025-01-23 ASSESSMENT — PAIN SCALES - GENERAL
PAINLEVEL_OUTOF10: 0 - NO PAIN

## 2025-01-23 ASSESSMENT — PAIN - FUNCTIONAL ASSESSMENT
PAIN_FUNCTIONAL_ASSESSMENT: 0-10

## 2025-01-23 NOTE — PROGRESS NOTES
01/23/25 1259   Discharge Planning   Expected Discharge Disposition Rehab   Does the patient need discharge transport arranged? Yes   RoundTrip coordination needed? Yes     Patient transferred from [facility] for EP consult. Referral for [facility] resent in [facility].  [facility] is reviewing pending EP recommendations/pacer placement. Per previous TCC, patient's insurance will only cover a [facility] rehab facility.  Family provided choice of [facility].

## 2025-01-23 NOTE — PROGRESS NOTES
Physical Therapy                 Therapy Communication Note    Patient Name: Tony King  MRN: [id number]  Department: [facility] CVIN  [facility] *  Today's Date: 1/23/2025     Discipline: Physical Therapy    PT Missed Visit: Yes     Missed Visit Reason:  Cancel PT Evaluation - pt admitted with bradycardia and is scheduled for pacemaker placement today.

## 2025-01-23 NOTE — PROGRESS NOTES
Occupational Therapy                 Therapy Communication Note    Patient Name: Tony King  MRN: [id number]  Department: [facility]  Room: 01/01-B  Today's Date: 1/23/2025     Discipline: Occupational Therapy    OT Missed Visit: Yes     Missed Visit Reason: Cancel (Per nurse, pt is getting a pacemaker placement today.)    Missed Time: Cancel

## 2025-01-23 NOTE — POST-PROCEDURE NOTE
Physician Transition of Care Summary  Invasive Cardiovascular Lab    Procedure Date: 1/23/2025  Attending:    * Eloy Correa - Primary  Resident/Fellow/Other Assistant: Surgeons and Role:  * No surgeons found with a matching role *    Indications:   Pre-op Diagnosis      * Generalized weakness [R53.1]     * Bradycardia [R00.1]     * HB (heart block) [I45.9]    Post-procedure diagnosis:   Post-op Diagnosis     * Generalized weakness [R53.1]     * Bradycardia [R00.1]     * HB (heart block) [I45.9]    Procedure(s):     * PPM IMPLANT DUAL      Procedure Findings:   See below    Description of the Procedure:   After written witnessed informed consent was obtained the procedure from the patient, the patient was transferred to the EP laboratory. The patient was in the fasting state. A grounding pad was placed. The patient was set up for monitoring of surface ECG. The left upper chest was prepped and draped in the usual sterile fashion. Bupivacaine was administered locally for anesthetic. A 4 cm incision was placed at the left deltopectoral groove.  A prepectoral pocket was made to accommodate the device generator. Left subclavian vein access was obtained with a micropuncture kit for both leads    The right ventricular lead was delivered to the RV via a peel away 7Fr sheath to the RV mid septal region under fluoroscopic guidance. Position was confirmed in the ALANA and PENN projections. The helix was deployed, and two way communication was set up with the device interrogator. The sensing was 7 mv, with threshold for ventricular capture at 0.75V at  0.5 ms pulse width, impedance was  610ohms. The lead was sutured at the suture sleeves the pectoralis minor muscle. There was no phrenic nerve stimulation maximal output.    The right atrial lead was delivered to the RA via a peel- away 7Fr sheath to the RAA region under fluoroscopic guidance. Position was confirmed in the ALANA and PENN projections. The helix was deployed, and  two way communication was set up with the device interrogator. The sensing was 4.8mv, with threshold for atrial capture at 0.5V at  0.5 ms pulse width, impedance was 410 ohms. The lead was sutured at the suture sleeves the pectoralis minor muscle. There was no phrenic nerve stimulation maximal output.    Both leads with an attached to the generator header and placed in a preformed generator pocket.   A Tyrex antibiotic pouch was utilized  The incision was closed with 3 layers of suture. A 0 Vicryl interrupted layer, followed by a 3.0 B. V-loc continuous layer, and a 4.0 V-loc continuous layer for the subcuticular layer. Steri Strips and a dressing were applied.    Summary  Successful dual chamber pacemaker implant.     Complications:   none    Stents/Implants:   Implants       Pacemaker    Lead, Pacemaker, Ultipace 58cm - [id number] - Implanted        Inventory item: LEAD, PACEMAKER, ULTIPACE  58CM Model/Cat number: [id number]    Serial number: [id number] : [employer]    Lot number: [id number] Device identifier: [id number]    Implant Date: 1/23/2025        GUDID Information       Request status Successful        Brand name: UltiPace™ Version/Model: [id number]    Company name: [employer] MRI safety info as of 1/23/25: MR Conditional    Contains dry or latex rubber: No      GMDN P.T. name: Endocardial/interventricular septal pacing lead                As of 1/23/2025       Status: Implanted                      Other Cardiac Implant    Envelope, Antibacterial, Aigis Rx Tyrx, Absorbable, Med - [id number] - Implanted        Inventory item: ENVELOPE, ANTIBACTERIAL, AIGIS RX TYRX, ABSORBABLE, MED Model/Cat number: [id number]    : [employer] Lot number: [id number]    Device identifier: [id number]        GUDID Information       Request status Successful        Brand name: TYRX™ Absorbable Antibacterial Envelope - Medium Version/Model: [id number]    Company name: [employer] MRI  safety info as of 1/23/25: Labeling does not contain MRI Safety Information    Contains dry or latex rubber: No      GMDN P.T. name: Implantable pulse generator mesh bag, bioabsorbable                As of 1/23/2025       Status: Implanted                      Implant    Pacemaker, Generator, Dual Assurity Mri - [id number] - Implanted        Inventory item: PACEMAKER, GENERATOR, DUAL ASSURITY MRI Model/Cat number: [id number]    Serial number: [id number] : [employer]    Lot number: [id number] Device identifier: [id number]      GUDID Information       Request status Successful        Brand name: Assurity MRI™ Version/Model: [id number]    Company name: [employer] MRI safety info as of 1/23/25: MR Conditional    Contains dry or latex rubber: No      GMDN P.T. name: Dual-chamber implantable pacemaker, rate-responsive                As of 1/23/2025       Status: Implanted                              Anticoagulation/Antiplatelet Plan:   none    Estimated Blood Loss:   60 mL    Anesthesia: Moderate Sedation Anesthesia Staff: No anesthesia staff entered.    Any Specimen(s) Removed:   No specimens collected during this procedure.    Disposition:   stable      Electronically signed by: Eloy Correa MD, 1/23/2025 2:51 PM   yes

## 2025-01-23 NOTE — DISCHARGE INSTRUCTIONS
Post Pacemaker Instructions: Please leave dressing on for total a 7 days then may remove, there are Steri-Strips underneath, please allow to dry and fall off naturally or will be removed at the time of your office appointment.    No driving 5 days, you may shower with current dressing please do not soak the site, lifting restrictions: nothing >10 to 12 pounds over affected shoulder for 4-6 weeks, avoid repetitive type activity, for ex: golfing, bowling, swimming for 4-6 wks. Please call with any changes in incision site, new tenderness, pain or redness

## 2025-01-23 NOTE — CONSULTS
Inpatient consult to Cardiology  Consult performed by: Enma Rivera, MANUEL-CNP  Consult ordered by: Alton Lambert MD  Reason for consult: CHB intermittent      History Of Present Illness:    Tony King is a 69 y.o. male who presented the hospital with weakness and fatigue which has been occurring for past few days.  Patient had developed lightheadedness and dizziness prior to the arrival of EMS. There was no note of any bradycardia or hypotension en route to the hospital. Past medical history of hypertensive disorder, hyperlipidemia, inflammatory myopathy, 2012 CVA which thereafter underwent repair of PFO,  diastolic heart failure, and chronic kidney disease stage 3, PVD with revascularization non-STEM 12/22 elevated troponin, underwent nuclear stress test at that time which was negative for ischemia could not have cardiac catheterization with a creatinine of 2.4, additionally, nonhealing foot wound, right great toe with purulent drainage 12/28, had right leg angiography with balloon angioplasty and stenting of right SFA., balloon angioplasty to popliteal artery, posterior tibial artery and peroneal artery,balloon angioplasty of the mid and distal SFA and the popliteal artery was completed. On 12/31, had amputation of right great toe.      During the hospital course patient was found to have intermittent periods of 2-1 heart block and complete heart block with episode of unresponsiveness to the blood pressure remained intact. Telestroke team was contacted. Patient was not found to be candidate for tenecteplase or any intervention. MRI brain done on 1/21/2025 revealed acute or subacute infarct located within the left cerebral hemisphere in the territory of PAULIE.  Patient was seen by neurology  felt he had a subacute CVA during the unresponsive that resolved.    Echocardiogram was without any cardiomyopathy.  Was started on clopidogrel and aspirin though history of myopathy and rhabdomyolysis precludes the use of  statins.  Baseline EKG has left anterior fascicular block with first-degree AV block.   Patient continued to be followed on telemetry and over next 24 hours the patient continued to have A-V dissociation for brief periods of time during the at bedtime.  During day mostly had sinus rhythm though occasional blocked beats.  Patient has never worn a Holter monitor.  After review of underlying conduction disease, episode of unresponsiveness, dizziness prior to admission and continued A-V dissociation for short periods during sleep off AV claudine agents question that patient would benefit from dual-chamber pacemaker w/ dissociation suspect worsening of conduction disease, potential for syncope and LOC.    Last Recorded Vitals:  Vitals:    01/23/25 0200 01/23/25 0341 01/23/25 0417 01/23/25 0714   BP: 161/67 173/69  161/63   BP Location: Right arm Right arm  Right arm   Patient Position: Lying Lying  Lying   Pulse: 86 83  73   Resp: 16 16     Temp:  36.2 °C (97.2 °F)  36.2 °C (97.2 °F)   TempSrc:  Temporal  Temporal   SpO2:  95%  97%   Weight:   116 kg (255 lb 8.2 oz)    Height:           Last Labs:  CBC - 1/22/2025:  5:45 PM  11.4 14.0 166    41.6      CMP - 1/22/2025:  5:45 PM  7.8 7.1 19 --- 0.5   _ 3.3 12 61      PTT - No results in last year.  _   _ _     Troponin I, High Sensitivity   Date/Time Value Ref Range Status   01/20/2025 03:00 PM 14 0 - 20 ng/L Final   01/18/2025 09:15 PM 16 0 - 20 ng/L Final   01/18/2025 08:17 PM 15 0 - 20 ng/L Final     BNP   Date/Time Value Ref Range Status   09/23/2021 02:04 PM 18 0 - 99 pg/mL Final     Comment:     .  <100 pg/mL - Heart failure unlikely  100-299 pg/mL - Intermediate probability of acute heart  .               failure exacerbation. Correlate with clinical  .               context and patient history.    >=300 pg/mL - Heart Failure likely. Correlate with clinical  .               context and patient history.  BNP testing is performed using different testing   methodology at  [facility] (formerly [facility]) than at other   [facility]. Direct result comparisons should   only be made within the same method.       Hemoglobin A1C   Date/Time Value Ref Range Status   01/22/2025 05:45 PM 8.1 (H) See comment % Final     Comment:     Hemoglobin variant detected which does not interfere with determination of Hemoglobin A1c. Hemoglobin identification can be ordered to characterize the variant if clinically indicated.   06/21/2023 05:12 PM 7.3 (H) 4.0 - 6.0 % Final     Comment:     Hemoglobin A1C levels are related to mean blood glucose during the   preceding 2-3 months. The relationship table below may be used as a   general guide. Each 1% increase in HGB A1C is a reflection of an   increase in mean glucose of approximately 30 mg/dl.   Reference: Diabetes Care, volume 29, supplement 1 Jan. 2006                        HGB A1C ................. Approx. Mean Glucose   _______________________________________________   6%   ...............................  120 mg/dl   7%   ...............................  150 mg/dl   8%   ...............................  180 mg/dl   9%   ...............................  210 mg/dl   10%  ...............................  240 mg/dl  Performed at [address] [address]     01/06/2023 04:48 AM 7.9 (H) 4.0 - 6.0 % Final     Comment:     Hemoglobin A1C levels are related to mean blood glucose during the   preceding 2-3 months. The relationship table below may be used as a   general guide. Each 1% increase in HGB A1C is a reflection of an   increase in mean glucose of approximately 30 mg/dl.   Reference: Diabetes Care, volume 29, supplement 1 Jan. 2006                        HGB A1C ................. Approx. Mean Glucose   _______________________________________________   6%   ...............................  120 mg/dl   7%   ...............................  150 mg/dl   8%   ...............................  180 mg/dl   9%    ...............................  210 mg/dl   10%  ...............................  240 mg/dl  Performed at [address] [place] [address]       LDL Calculated   Date/Time Value Ref Range Status   06/21/2023 05:12  65 - 130 MG/DL Final   12/23/2022 05:33 AM 93 65 - 130 MG/DL Final   11/29/2021 10:46  (H) 65 - 130 MG/DL Final      Last I/O:  I/O last 3 completed shifts:  In: - (0 mL/kg)   Out: 900 (7.8 mL/kg) [Urine:900 (0.2 mL/kg/hr)]  Weight: 115.9 kg     Past Cardiology Tests (Last 3 Years):  EKG:  Electrocardiogram, 12-lead PRN ACS symptoms 01/20/2025  Encounter Date: 01/18/25   Electrocardiogram, 12-lead PRN ACS symptoms   Result Value    Ventricular Rate 40    Atrial Rate 44    QRS Duration 114    QT Interval 494    QTC Calculation(Bazett) 402    P Axis 67    R Axis -40    T Axis -33    QRS Count 6    Q Onset 209    T Offset 456    QTC Fredericia 431    Narrative    Marked sinus bradycardia with AV dissociation and Junctional bradycardia with Sinus/atrial capture with Fusion complexes  Left axis deviation  T wave abnormality, consider lateral ischemia  Abnormal ECG  When compared with ECG of 18-JAN-2025 19:58, (unconfirmed)  Significant changes have occurred  Confirmed by Camila Olivarez ([id number]) on 1/21/2025 10:08:20 AM       1/20:CT angio head and neck w and wo IV contrast    Result Date: 1/20/2025  Interpreted By:  Mateusz Wright, STUDY: CT ANGIO HEAD AND NECK W AND WO IV CONTRAST;  1/20/2025 5:12 pm   INDICATION: Signs/Symptoms:Brain attack.     COMPARISON: CT head today.   ACCESSION NUMBER(S): [id number]   ORDERING CLINICIAN: SHAKIR WHALEY   TECHNIQUE: 75 ML of Omnipaque 350 was administered intravenously and axial images of the head and neck were acquired.  Coronal, sagittal, and 3-D reconstructions were provided for review.   FINDINGS:   CTA COW: No major vascular occlusion. Abrupt cut off flow related enhancement A2 segment left anterior cerebral artery, suspected  thrombosis, with faint distal reconstitution. Moderate short-segment stenosis of the A2 segment right anterior cerebral artery. No aneurysms.  No vascular malformations.   CTA CAROTID: No aortic aneurysm or dissection. No significant stenoses at the origins of the great vessels from the aortic arch. No significant stenoses of the brachycephalic artery or bilateral subclavian arteries.   No significant stenoses bilateral carotid arterial systems. Luminal irregularity of the proximal bilateral cervical internal carotid artery suspected be related to fibrofatty plaque rather than connective tissue disorder.   Severe short-segment stenosis V1 segment right vertebral artery. Moderate short-segment stenosis of the V1 segment of the left vertebral artery.   NONVASCULAR NECK: No soft tissue mass. No adenopathy. Salivary glands are unremarkable. Multinodular thyroid. Bones intact.         1. No LVO. Abrupt cut off flow related enhancement A2 segment left anterior cerebral artery, suspected thrombosis, with faint distal reconstitution. 2. Severe short-segment stenosis V1 segment right vertebral artery. Moderate short-segment stenosis of the V1 segment of the left vertebral artery. Suspect atherosclerotic involvement though thrombosis not excluded. 3. Multinodular thyroid. Suggest correlation with thyroid ultrasound to assess for possible neoplasm.   MACRO: None   Signed by: Mateusz Wright 1/20/2025 6:41 PM Dictation workstation:   [id number]      Echo:  Transthoracic Echo (TTE) Complete    Result Date: 1/21/2025            [place] [id number] [address] , [place] [id number]             Phone [phone] TRANSTHORACIC ECHOCARDIOGRAM REPORT Patient Name:       BUSHRA Hensley Physician:    [id number] Camila Olivarez MD Study Date:         1/21/2025            Ordering Provider:    Mason CALDERA                                                                 BEST MRN/PID:            [id number]             Fellow: Accession#:         [id number]         Nurse: Date of Birth/Age:  1955 / 69 years  Sonographer:          Darya Womack RDCS Gender Assigned at                      Additional Staff: Birth: Height:             195.58 cm            Admit Date: Weight:             117.94 kg            Admission Status:     Inpatient -                                                                Routine BSA / BMI:          2.50 m2 / 30.83      Department Location:  [facility]                     kg/m2 Blood Pressure: 162 /79 mmHg Study Type:    TRANSTHORACIC ECHO (TTE) COMPLETE Diagnosis/ICD: Bradycardia, unspecified-R00.1; Abnormal electrocardiogram [ECG]                [EKG]-R94.31 Indication:    BRADYCARDIA, ABN EKG CPT Codes:     Echo Complete w Full [id number] Patient History: Diabetes:          Yes Pertinent History: HTN, Hyperlipidemia, CAD, CVA, PVD and H/O PFO repair,                    Bradycardia, ABN EKG. Study Detail: The following Echo studies were performed: 2D, M-Mode, Doppler and               color flow.  PHYSICIAN INTERPRETATION: Left Ventricle: The left ventricular systolic function is normal, with a visually estimated ejection fraction of 55-60%. There are no regional wall motion abnormalities. The left ventricular cavity size is normal. There is normal septal and normal posterior left ventricular wall thickness. There is left ventricular concentric remodeling. Spectral Doppler shows a Grade I (impaired relaxation pattern) of left ventricular diastolic filling with normal left atrial filling pressure. Left Atrium: The left atrium is mildly dilated. Right Ventricle: The right ventricle is normal in size. There is normal right ventricular global systolic function. Right Atrium: The right atrium is normal in size. Aortic Valve: The aortic valve is trileaflet. There  is no evidence of aortic valve regurgitation. The peak instantaneous gradient of the aortic valve is 5 mmHg. Mitral Valve: The mitral valve is normal in structure. There is trace mitral valve regurgitation. Tricuspid Valve: The tricuspid valve is structurally normal. There is trace tricuspid regurgitation. The right ventricular systolic pressure is unable to be estimated. Pulmonic Valve: The pulmonic valve is structurally normal. There is physiologic pulmonic valve regurgitation. Pericardium: No pericardial effusion noted. Aorta: The aortic root is normal.  CONCLUSIONS:  1. Poorly visualized anatomical structures due to suboptimal image quality.  2. The left ventricular systolic function is normal, with a visually estimated ejection fraction of 55-60%.  3. Spectral Doppler shows a Grade I (impaired relaxation pattern) of left ventricular diastolic filling with normal left atrial filling pressure.  4. There is normal right ventricular global systolic function.  5. Trace mitral valve regurgitation.  6. Trace tricuspid regurgitation is visualized. QUANTITATIVE DATA SUMMARY:  2D MEASUREMENTS:           Normal Ranges: LAs:             2.90 cm   (2.7-4.0cm) IVSd:            0.94 cm   (0.6-1.1cm) LVPWd:           0.97 cm   (0.6-1.1cm) LVIDd:           3.49 cm   (3.9-5.9cm) LVIDs:           2.60 cm LV Mass Index:   38.7 g/m2 LV % FS          25.5 %  LA VOLUME:                    Normal Ranges: LA Vol A4C:        63.8 ml    (22+/-6mL/m2) LA Vol A2C:        70.4 ml LA Vol BP:         74.6 ml LA Vol Index A4C:  25.5ml/m2 LA Vol Index A2C:  28.1 ml/m2 LA Vol Index BP:   29.8 ml/m2 LA Area A4C:       18.9 cm2 LA Area A2C:       22.1 cm2 LA Major Axis A4C: 4.8 cm LA Major Axis A2C: 5.9 cm LA Volume Index:   27.9 ml/m2 LA Vol A4C:        57.9 ml LA Vol A2C:        68.2 ml LA Vol Index BSA:  25.2 ml/m2  RA VOLUME BY A/L METHOD:          Normal Ranges: RA Area A4C:             12.5 cm2  M-MODE MEASUREMENTS:         Normal Ranges: Ao  Root:             3.20 cm (2.0-3.7cm)  AORTA MEASUREMENTS:         Normal Ranges: Asc Ao, d:          2.90 cm (2.1-3.4cm)  LV SYSTOLIC FUNCTION BY 2D PLANIMETRY (MOD):                      Normal Ranges: EF-A4C View:    58 % (>=55%) EF-A2C View:    52 % EF-Biplane:     57 % EF-Visual:      58 % LV EF Reported: 58 %  LV DIASTOLIC FUNCTION:           Normal Ranges: MV Peak E:             0.54 m/s  (0.7-1.2 m/s) MV Peak A:             0.96 m/s  (0.42-0.7 m/s) E/A Ratio:             0.57      (1.0-2.2) MV e'                  0.047 m/s (>8.0) MV lateral e'          0.06 m/s MV medial e'           0.03 m/s E/e' Ratio:            11.48     (<8.0)  MITRAL VALVE:          Normal Ranges: MV DT:        263 msec (150-240msec)  AORTIC VALVE:           Normal Ranges: AoV Vmax:      1.07 m/s (<=1.7m/s) AoV Peak P.6 mmHg (<20mmHg) LVOT Max Jarrett:  0.91 m/s (<=1.1m/s) LVOT Diameter: 2.30 cm  (1.8-2.4cm) AoV Area,Vmax: 3.53 cm2 (2.5-4.5cm2)  RIGHT VENTRICLE: RV Basal 3.50 cm RV Mid   1.34 cm RV Major 7.5 cm TAPSE:   17.8 mm RV s'    0.13 m/s  TRICUSPID VALVE/RVSP:          Normal Ranges: Peak TR Velocity:     2.02 m/s RV Syst Pressure:     19 mmHg  (< 30mmHg)  [id number] Camila Olivarez MD Electronically signed on 2025 at 9:32:17 AM  ** Final **      MRI Brain   There are small old infarcts located within the bilateral basal  ganglia and thalami, unchanged in appearance compared the prior MRI.  Stable small focus of T2 hyperintensity located within the white  matter within the posterior limb of the right internal capsule which  is most consistent with an old infarct. There are small foci of T2  hyperintensity located within the zee which are most consistent with  old infarcts. There are small old infarcts in the right cerebellum.   There are confluence regions of T2 hyperintensity within the  bilateral cerebral hemispheric white matter which are probably  sequela of chronic small vessel ischemic changes.  There are tiny mucosal  cysts located within the right maxillary sinus  and within the sphenoid sinus, otherwise the visualized paranasal  sinuses and mastoid air cells are essentially clear.    IMPRESSION:  1. Acute or subacute infarcts located within the left cerebral  hemisphere in the territory of the PAULIE. There is no striking mass  effect and there is no hemorrhagic transformation.    2. Chronic intracranial findings as above.    Past Medical History:  He has a past medical history of Chronic kidney disease, unspecified (09/26/2014), Essential (primary) hypertension (09/26/2014), History of deep venous thrombosis (12/22/2022), History of diabetes mellitus (04/05/2024), History of elevated lipids (04/05/2024), Patent foramen ovale (HHS-HCC) (09/26/2014), Personal history of other endocrine, nutritional and metabolic disease (09/26/2014), Personal history of other endocrine, nutritional and metabolic disease (09/26/2014), Personal history of other venous thrombosis and embolism (09/26/2014), Personal history of pulmonary embolism (12/22/2022), Personal history of transient ischemic attack (TIA), and 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    Past Surgical History:  He has a past surgical history that includes US guided percutaneous placement (7/20/2020).      Social History:  He reports that he has never smoked. He has never used smokeless tobacco. He reports that he does not drink alcohol and does not use drugs.    Family History:  No family history on file.     Allergies:  Statins-hmg-coa reductase inhibitors, Ciprofloxacin, and Penicillins    Inpatient Medications:  Scheduled medications   Medication Dose Route Frequency    amLODIPine  2.5 mg oral Daily    aspirin  81 mg oral Daily    clopidogrel  75 mg oral Daily    [START ON 1/26/2025] ergocalciferol  1,250 mcg oral Every Sunday    famotidine  20 mg oral Daily    Or    famotidine  20 mg intravenous Daily    finasteride  " 5 mg oral Daily    [Held by provider] heparin (porcine)  5,000 Units subcutaneous q12h    insulin glargine  38 Units subcutaneous q24h    insulin lispro  0-10 Units subcutaneous TID AC    polyethylene glycol  17 g oral Daily    tamsulosin  0.4 mg oral Daily     PRN medications   Medication    acetaminophen    Or    acetaminophen    Or    acetaminophen    acetaminophen    Or    acetaminophen    Or    acetaminophen    dextrose    dextrose    glucagon    glucagon    melatonin    ondansetron    Or    ondansetron    sennosides-docusate sodium    traMADol     Continuous Medications   Medication Dose Last Rate     Outpatient Medications:  Current Outpatient Medications   Medication Instructions    amLODIPine (Norvasc) 2.5 mg tablet Take by mouth.    aspirin 81 mg, oral, Daily    BD Ultra-Fine Orig Pen Needle 29 gauge x 1/2\" needle USE AS DIRECTED... 4 TIMES A DAY FOR 90 DAYS    blood sugar diagnostic (Contour Next Test Strips) strip USE AS DIRECTED 4 TIMES A DAY    clopidogrel (PLAVIX) 75 mg, oral, Daily    Dexcom G6  misc Dexcom G6  - as directed 14 Jun, 2019 Active    Dexcom G6 Sensor device Dexcom G6 Sensor - Check glucose 4x/day for 30 day(s) 14 Jun, 2019 Active    Dexcom G6 Transmitter device Dexcom G6 Transmitter - as directed 14 Jun, 2019 Active    docusate sodium (Colace) 100 mg capsule oral    ergocalciferol (VITAMIN D-2) 1,250 mcg, oral, Once Weekly    finasteride (PROSCAR) 5 mg, oral, Daily    glucagon 1 mg injection intramuscular    HYDROcodone-acetaminophen (Norco) 5-325 mg tablet oral    insulin lispro (HumaLOG KwikPen Insulin) 100 unit/mL injection Inject 20 Units under the skin once daily with breakfast AND 20 Units once daily at noon. Take with meals AND 20 Units once daily in the evening. Take with meals. Take as directed per insulin instructions.    irbesartan (AVAPRO) 300 mg, oral, Daily    ketoconazole (NIZOral) 2 % cream As needed    ketorolac tromethamine (KETOROLAC ORAL) TORADOL " 10mg 1 every 6 hours prn Active    labetalol (NORMODYNE) 100 mg, oral, 2 times daily    Levemir FlexTouch U100 Insulin 70 Units, subcutaneous, Every morning, Take as directed per insulin instructions.    meclizine (ANTIVERT) 25 mg, oral, As needed    mirabegron (Myrbetriq) 50 mg tablet extended release 24 hr 24 hr tablet oral    pantoprazole (ProtoNix) 40 mg EC tablet oral    polyethylene glycol (Glycolax, Miralax) 1 gram packet oral    sennosides-docusate sodium (Linda-Colace) 8.6-50 mg tablet oral    tamsulosin (FLOMAX) 0.4 mg, oral, Daily    traMADol (ULTRAM) 25 mg, oral, As needed       Physical Exam:  On examination the patient is alert and oriented x 3 though speech slow with some word searching.  The neck veins are not elevated upstrokes and volumes are normal there are no carotid bruits trachea is midline lungs are clear throughout both lobes with a soft expiratory wheeze in the right lung.  There is normal sounding S1 single S2 without cardiac murmurs the PMI is not on displaced.  Abdomen is unremarkable extremities have no peripheral edema there is a sacral wound covered with dressing (being followed by wound care), the extremities have no peripheral edema pulses are palpable bilaterally sequential MYRNA hose in place telemetry reveals sinus rhythm with a first-degree AV block 0.26, intermittent missed/block beats and A-V dissociation during at bedtime lasting for 15 seconds at intervals.     Assessment/Plan   69-year-old male with underlying conduction disease seen on ECG, maintained on high-dose beta-blocker for hypertension presents with lightheadedness dizziness telemetry with intermittent A-V dissociation, episode of unresponsiveness with noted junctional escape and A-V dissociation though no hypotension, intermittent episodes persisting after no beta-blocker for 48 hours.   Review of above symptomatic bradycardia, AV dissociation after 48 hours off beta-blocker class I indication for permanent  pacemaker.  Echocardiogram with normal left ventricular function LVH, no valvular study, bubble not done regarding PFO though in the past has been unremarkable, will plan for device implantation today.  Vancomycin preprocedure,  postprocedure will utilize metoprolol over labetalol for LVH, no longer on ARB for hypertension secondary to labile renal function can utilize beta-blocker, increase as needed, continue aspirin and clopidogrel related to CVA.   Usual device interrogation chest x-ray in a.m.        Peripheral IV 01/18/25 20 G Left;Anterior Forearm (Active)   Site Assessment Clean;Dry;Intact 01/23/25 0845   Dressing Type Transparent 01/23/25 0845   Line Status Capped 01/23/25 0845   Cap Change (needleless connector) Cap changed 01/23/25 0845   Dressing Status Clean;Dry;Occlusive 01/23/25 0845   Number of days: 5     Discussed with Dr. Correa  Code Status:  Full Code    I spent 60 minutes in the professional and overall care of this patient.        Enma Rivera, APRN-CNP

## 2025-01-23 NOTE — PROGRESS NOTES
Tony King is a 69 y.o. male on day 1 of admission presenting with Bradycardia.      Subjective   Patient denied chest pain shortness of breath.  He denied numbness or ting upper or lower extremities.  Patient was transfer to [facility] from [facility] to be evaluated by electrophysiology regarding secondary degree AV block.     Objective     Last Recorded Vitals  /63 (BP Location: Right arm, Patient Position: Lying)   Pulse 73   Temp 36.2 °C (97.2 °F) (Temporal)   Resp 16   Wt 116 kg (255 lb 8.2 oz)   SpO2 97%   Intake/Output last 3 Shifts:    Intake/Output Summary (Last 24 hours) at 1/23/2025 0755  Last data filed at 1/23/2025 0000  Gross per 24 hour   Intake --   Output 900 ml   Net -900 ml       Admission Weight  Weight: 123 kg (271 lb 2.7 oz) (01/22/25 1658)    Daily Weight  01/23/25 : 116 kg (255 lb 8.2 oz)    Image Results  EEG  IMPRESSION    Impression  This is a normal awake routine EEG. No epileptiform discharges or lateralizing signs seen.    A full report will be scanned into the patient's chart at a later time.    This report has been interpreted and electronically signed by      Physical Exam    General: cooperating during physical exam.  HEENT: Pupils are equal and reactive to light and commendation , oral mucosa moist, no JVD.  Cardiovascular: PMI nondisplaced, S1-S2 normal  Lungs: Clear to auscultation bilaterally, no wheezing, no crackles, no dullness to percussion.  Abdomen: No hepatosplenomegaly appreciated, soft , not tender, positive bowel sounds, positive bowel movement.  Neuro: Alert and oriented x2, strength on right upper extremity and right lower extremity 4 out of 5   Musculoskeletal: Right great toe amputated,  Vascular: Pulses are intact in upper and lower extremities  Skin: Changes in lower extremities from venous stasis    Assessment/Plan        Second-degree AV block   Patient was transferred from [facility] to [facility] to be evaluated by  electrophysiology.  Waiting for  Dr. Correa from electrophysiology services to evaluate him today  Monitor patient has intermittently secondary AV block  TSH was normal  Monitor close   Monitor in stepdown     Right sided weakness  Acute CVA  CT brain no acute intracranial finding  MRI brain revealed acute versus subacute infarct located in the left cerebral hemisphere in the territory PAULIE.  Patient was started on aspirin and Plavix.    Tele stroke neurology recommended to continue with aspirin and Plavix for 90 days and aspirin after.  Patient was evaluated by Dr. Nelson from neurology services.  I will reconsult Dr. Nelson.     History of  myositis  Rhabdomyolysis  CK trend down  Hold statins     Type 1 diabetes mellitus  Fairly controlled, hemoglobin A1c 8.1  Increase Lantus to 38 units q. bedtime, cover with insulin sliding scale  Monitor close diabetic diet   Accu-Chek before meals and at bedtime.     Morbid obesity  BMI 32.13   advised regarding diet.     Acute kidney injury  Creatinine improved  Most likely secondary to IV contrast  Avoid nephrotoxic drugs  Patient was challenged with fluids  Consult Dr. Mazariegos urology services     DVT prophylaxis     Deconditioning  PT OT to see him  Fall precaution  Ambulate with assistance     Multinodular thyroid  I will refer the patient to Dr. Loredo endocrinologist as outpatient  Patient understood importance of following up with endocrinology     Discussed with the nurse at bedside  CBC and BMP in AM.  Check CK in a.m.  Waiting for Dr. Correa to see him today.    Time spent with patient 50 minutes    Alton Lambert MD

## 2025-01-23 NOTE — CARE PLAN
Problem: Pain - Adult  Goal: Verbalizes/displays adequate comfort level or baseline comfort level  Outcome: Progressing     Problem: Safety - Adult  Goal: Free from fall injury  Outcome: Progressing     Problem: Discharge Planning  Goal: Discharge to home or other facility with appropriate resources  Outcome: Progressing     Problem: Chronic Conditions and Co-morbidities  Goal: Patient's chronic conditions and co-morbidity symptoms are monitored and maintained or improved  Outcome: Progressing     Problem: Nutrition  Goal: Nutrient intake appropriate for maintaining nutritional needs  Outcome: Progressing     Problem: Fall/Injury  Goal: Not fall by end of shift  Outcome: Progressing  Goal: Be free from injury by end of the shift  Outcome: Progressing  Goal: Verbalize understanding of personal risk factors for fall in the hospital  Outcome: Progressing  Goal: Verbalize understanding of risk factor reduction measures to prevent injury from fall in the home  Outcome: Progressing  Goal: Use assistive devices by end of the shift  Outcome: Progressing  Goal: Pace activities to prevent fatigue by end of the shift  Outcome: Progressing     Problem: Cardiac catheterization  Goal: Free from dysrhythmias  Outcome: Progressing  Goal: Free from pain  Outcome: Progressing  Goal: No evidence of post procedure complications  Outcome: Progressing  Goal: Promote self management  Outcome: Progressing  Goal: Verbalize understanding of procedure  Outcome: Progressing  Goal: Care and maintenance of device (specify)  Outcome: Progressing   The patient's goals for the shift include      The clinical goals for the shift include safety

## 2025-01-24 ENCOUNTER — APPOINTMENT (OUTPATIENT)
Dept: RADIOLOGY | Facility: HOSPITAL | Age: 70
DRG: 065 | End: 2025-01-24
Payer: MEDICARE

## 2025-01-24 DIAGNOSIS — I45.9 HEART BLOCK: ICD-10-CM

## 2025-01-24 DIAGNOSIS — Z95.0 PACEMAKER: Primary | ICD-10-CM

## 2025-01-24 LAB
ANION GAP SERPL CALCULATED.3IONS-SCNC: 9 MMOL/L (ref 10–20)
BASOPHILS # BLD AUTO: 0.04 X10*3/UL (ref 0–0.1)
BASOPHILS NFR BLD AUTO: 0.4 %
BUN SERPL-MCNC: 25 MG/DL (ref 6–23)
CALCIUM SERPL-MCNC: 8.2 MG/DL (ref 8.6–10.3)
CHLORIDE SERPL-SCNC: 106 MMOL/L (ref 98–107)
CO2 SERPL-SCNC: 27 MMOL/L (ref 21–32)
CREAT SERPL-MCNC: 1.85 MG/DL (ref 0.5–1.3)
EGFRCR SERPLBLD CKD-EPI 2021: 38 ML/MIN/1.73M*2
EOSINOPHIL # BLD AUTO: 0.3 X10*3/UL (ref 0–0.4)
EOSINOPHIL NFR BLD AUTO: 3.2 %
ERYTHROCYTE [DISTWIDTH] IN BLOOD BY AUTOMATED COUNT: 13.6 % (ref 11.5–14.5)
GLUCOSE BLD MANUAL STRIP-MCNC: 126 MG/DL (ref 74–99)
GLUCOSE BLD MANUAL STRIP-MCNC: 137 MG/DL (ref 74–99)
GLUCOSE BLD MANUAL STRIP-MCNC: 194 MG/DL (ref 74–99)
GLUCOSE BLD MANUAL STRIP-MCNC: 250 MG/DL (ref 74–99)
GLUCOSE SERPL-MCNC: 136 MG/DL (ref 74–99)
HCT VFR BLD AUTO: 42.3 % (ref 41–52)
HGB BLD-MCNC: 14.4 G/DL (ref 13.5–17.5)
IMM GRANULOCYTES # BLD AUTO: 0.03 X10*3/UL (ref 0–0.5)
IMM GRANULOCYTES NFR BLD AUTO: 0.3 % (ref 0–0.9)
LYMPHOCYTES # BLD AUTO: 1.65 X10*3/UL (ref 0.8–3)
LYMPHOCYTES NFR BLD AUTO: 17.4 %
MCH RBC QN AUTO: 29.1 PG (ref 26–34)
MCHC RBC AUTO-ENTMCNC: 34 G/DL (ref 32–36)
MCV RBC AUTO: 86 FL (ref 80–100)
MONOCYTES # BLD AUTO: 0.88 X10*3/UL (ref 0.05–0.8)
MONOCYTES NFR BLD AUTO: 9.3 %
NEUTROPHILS # BLD AUTO: 6.61 X10*3/UL (ref 1.6–5.5)
NEUTROPHILS NFR BLD AUTO: 69.4 %
NRBC BLD-RTO: 0 /100 WBCS (ref 0–0)
PLATELET # BLD AUTO: 175 X10*3/UL (ref 150–450)
POTASSIUM SERPL-SCNC: 4.1 MMOL/L (ref 3.5–5.3)
RBC # BLD AUTO: 4.95 X10*6/UL (ref 4.5–5.9)
SODIUM SERPL-SCNC: 138 MMOL/L (ref 136–145)
WBC # BLD AUTO: 9.5 X10*3/UL (ref 4.4–11.3)

## 2025-01-24 PROCEDURE — 2500000002 HC RX 250 W HCPCS SELF ADMINISTERED DRUGS (ALT 637 FOR MEDICARE OP, ALT 636 FOR OP/ED): Performed by: INTERNAL MEDICINE

## 2025-01-24 PROCEDURE — 97530 THERAPEUTIC ACTIVITIES: CPT | Mod: GP

## 2025-01-24 PROCEDURE — 99233 SBSQ HOSP IP/OBS HIGH 50: CPT | Performed by: STUDENT IN AN ORGANIZED HEALTH CARE EDUCATION/TRAINING PROGRAM

## 2025-01-24 PROCEDURE — 2500000001 HC RX 250 WO HCPCS SELF ADMINISTERED DRUGS (ALT 637 FOR MEDICARE OP): Performed by: REGISTERED NURSE

## 2025-01-24 PROCEDURE — 99232 SBSQ HOSP IP/OBS MODERATE 35: CPT | Performed by: INTERNAL MEDICINE

## 2025-01-24 PROCEDURE — 36415 COLL VENOUS BLD VENIPUNCTURE: CPT | Performed by: INTERNAL MEDICINE

## 2025-01-24 PROCEDURE — 2500000004 HC RX 250 GENERAL PHARMACY W/ HCPCS (ALT 636 FOR OP/ED): Performed by: INTERNAL MEDICINE

## 2025-01-24 PROCEDURE — 97163 PT EVAL HIGH COMPLEX 45 MIN: CPT | Mod: GP

## 2025-01-24 PROCEDURE — 82947 ASSAY GLUCOSE BLOOD QUANT: CPT

## 2025-01-24 PROCEDURE — 85025 COMPLETE CBC W/AUTO DIFF WBC: CPT | Performed by: INTERNAL MEDICINE

## 2025-01-24 PROCEDURE — 2500000001 HC RX 250 WO HCPCS SELF ADMINISTERED DRUGS (ALT 637 FOR MEDICARE OP): Performed by: INTERNAL MEDICINE

## 2025-01-24 PROCEDURE — 71046 X-RAY EXAM CHEST 2 VIEWS: CPT | Performed by: RADIOLOGY

## 2025-01-24 PROCEDURE — 2060000001 HC INTERMEDIATE ICU ROOM DAILY

## 2025-01-24 PROCEDURE — 97167 OT EVAL HIGH COMPLEX 60 MIN: CPT | Mod: GO

## 2025-01-24 PROCEDURE — 80048 BASIC METABOLIC PNL TOTAL CA: CPT | Performed by: INTERNAL MEDICINE

## 2025-01-24 PROCEDURE — 71046 X-RAY EXAM CHEST 2 VIEWS: CPT

## 2025-01-24 PROCEDURE — 4B02XSZ MEASUREMENT OF CARDIAC PACEMAKER, EXTERNAL APPROACH: ICD-10-PCS | Performed by: STUDENT IN AN ORGANIZED HEALTH CARE EDUCATION/TRAINING PROGRAM

## 2025-01-24 DEVICE — PACING LEAD
Type: IMPLANTABLE DEVICE | Site: HEART | Status: FUNCTIONAL
Brand: ULTIPACE™

## 2025-01-24 RX ADMIN — TAMSULOSIN HYDROCHLORIDE 0.4 MG: 0.4 CAPSULE ORAL at 10:10

## 2025-01-24 RX ADMIN — METOPROLOL SUCCINATE 50 MG: 50 TABLET, EXTENDED RELEASE ORAL at 21:09

## 2025-01-24 RX ADMIN — FAMOTIDINE 20 MG: 20 TABLET, FILM COATED ORAL at 10:11

## 2025-01-24 RX ADMIN — INSULIN GLARGINE 38 UNITS: 100 INJECTION, SOLUTION SUBCUTANEOUS at 21:09

## 2025-01-24 RX ADMIN — POLYETHYLENE GLYCOL 3350 17 G: 17 POWDER, FOR SOLUTION ORAL at 10:10

## 2025-01-24 RX ADMIN — ASPIRIN 81 MG: 81 TABLET, CHEWABLE ORAL at 10:10

## 2025-01-24 RX ADMIN — FINASTERIDE 5 MG: 5 TABLET, FILM COATED ORAL at 10:11

## 2025-01-24 RX ADMIN — AMLODIPINE BESYLATE 2.5 MG: 2.5 TABLET ORAL at 10:11

## 2025-01-24 RX ADMIN — CLOPIDOGREL BISULFATE 75 MG: 75 TABLET ORAL at 10:11

## 2025-01-24 RX ADMIN — INSULIN LISPRO 2 UNITS: 100 INJECTION, SOLUTION INTRAVENOUS; SUBCUTANEOUS at 17:25

## 2025-01-24 RX ADMIN — METOPROLOL SUCCINATE 50 MG: 50 TABLET, EXTENDED RELEASE ORAL at 10:11

## 2025-01-24 ASSESSMENT — COGNITIVE AND FUNCTIONAL STATUS - GENERAL
HELP NEEDED FOR BATHING: A LOT
TOILETING: A LITTLE
PERSONAL GROOMING: A LOT
TOILETING: TOTAL
MOVING TO AND FROM BED TO CHAIR: A LOT
TURNING FROM BACK TO SIDE WHILE IN FLAT BAD: A LITTLE
EATING MEALS: A LITTLE
STANDING UP FROM CHAIR USING ARMS: A LOT
CLIMB 3 TO 5 STEPS WITH RAILING: TOTAL
MOBILITY SCORE: 14
DRESSING REGULAR UPPER BODY CLOTHING: A LITTLE
DAILY ACTIVITIY SCORE: 19
TOILETING: A LITTLE
CLIMB 3 TO 5 STEPS WITH RAILING: A LITTLE
HELP NEEDED FOR BATHING: A LITTLE
DAILY ACTIVITIY SCORE: 11
STANDING UP FROM CHAIR USING ARMS: A LITTLE
STANDING UP FROM CHAIR USING ARMS: A LOT
PERSONAL GROOMING: A LITTLE
MOBILITY SCORE: 9
DRESSING REGULAR LOWER BODY CLOTHING: TOTAL
MOBILITY SCORE: 18
DRESSING REGULAR UPPER BODY CLOTHING: A LITTLE
MOVING TO AND FROM BED TO CHAIR: A LITTLE
WALKING IN HOSPITAL ROOM: A LOT
MOVING FROM LYING ON BACK TO SITTING ON SIDE OF FLAT BED WITH BEDRAILS: A LITTLE
MOVING FROM LYING ON BACK TO SITTING ON SIDE OF FLAT BED WITH BEDRAILS: A LITTLE
DAILY ACTIVITIY SCORE: 18
WALKING IN HOSPITAL ROOM: A LITTLE
TURNING FROM BACK TO SIDE WHILE IN FLAT BAD: TOTAL
DRESSING REGULAR UPPER BODY CLOTHING: A LOT
DRESSING REGULAR LOWER BODY CLOTHING: A LITTLE
HELP NEEDED FOR BATHING: A LITTLE
DRESSING REGULAR LOWER BODY CLOTHING: A LITTLE
PERSONAL GROOMING: A LITTLE
MOVING TO AND FROM BED TO CHAIR: A LOT
CLIMB 3 TO 5 STEPS WITH RAILING: A LOT
TURNING FROM BACK TO SIDE WHILE IN FLAT BAD: A LITTLE
EATING MEALS: A LITTLE
MOVING FROM LYING ON BACK TO SITTING ON SIDE OF FLAT BED WITH BEDRAILS: TOTAL
WALKING IN HOSPITAL ROOM: A LOT

## 2025-01-24 ASSESSMENT — ACTIVITIES OF DAILY LIVING (ADL)
ADL_ASSISTANCE: INDEPENDENT
BATHING_ASSISTANCE: MAXIMAL
ADL_ASSISTANCE: INDEPENDENT

## 2025-01-24 ASSESSMENT — PAIN SCALES - GENERAL
PAINLEVEL_OUTOF10: 0 - NO PAIN
PAINLEVEL_OUTOF10: 0 - NO PAIN

## 2025-01-24 ASSESSMENT — PAIN - FUNCTIONAL ASSESSMENT
PAIN_FUNCTIONAL_ASSESSMENT: 0-10
PAIN_FUNCTIONAL_ASSESSMENT: 0-10

## 2025-01-24 NOTE — PROGRESS NOTES
Tony King is a 69 y.o. male on day 2 of admission presenting with Bradycardia.      Subjective   Patient had pacemaker implant yesterday.  Patient denies chest pain shortness of breath.  He denied nausea or vomiting.    Objective     Last Recorded Vitals  /71 (BP Location: Right arm, Patient Position: Lying)   Pulse 69   Temp 36.3 °C (97.3 °F) (Temporal)   Resp 16   Wt 114 kg (251 lb 12.3 oz)   SpO2 98%   Intake/Output last 3 Shifts:    Intake/Output Summary (Last 24 hours) at 1/24/2025 0824  Last data filed at 1/24/2025 0001  Gross per 24 hour   Intake 200 ml   Output 1110 ml   Net -910 ml       Admission Weight  Weight: 123 kg (271 lb 2.7 oz) (01/22/25 1658)    Daily Weight  01/24/25 : 114 kg (251 lb 12.3 oz)    Image Results  Electrophysiology procedure  Table formatting from the original result was not included.  Procedure Date: 1/23/2025  Attending:    * Eloy Correa - Primary  Resident/Fellow/Other Assistant: Surgeons and Role:  * No surgeons found with a matching role *    Indications:   Pre-op Diagnosis      * Generalized weakness [R53.1]     * Bradycardia [R00.1]     * HB (heart block) [I45.9]    Post-procedure diagnosis:   Post-op Diagnosis     * Generalized weakness [R53.1]     * Bradycardia [R00.1]     * HB (heart block) [I45.9]    Procedure(s):     * PPM IMPLANT DUAL    Procedure Findings:   See below    Description of the Procedure:   After written witnessed informed consent was obtained the procedure from   the patient, the patient was transferred to the EP laboratory. The patient   was in the fasting state. A grounding pad was placed. The patient was set   up for monitoring of surface ECG. The left upper chest was prepped and   draped in the usual sterile fashion. Bupivacaine was administered locally   for anesthetic. A 4 cm incision was placed at the left deltopectoral   groove.  A prepectoral pocket was made to accommodate the device generator. Left   subclavian vein access was  obtained with a micropuncture kit for both   leads    The right ventricular lead was delivered to the RV via a peel away 7Fr   sheath to the RV mid septal region under fluoroscopic guidance. Position   was confirmed in the ALANA and PENN projections. The helix was deployed, and   two way communication was set up with the device interrogator. The sensing   was 7 mv, with threshold for ventricular capture at 0.75V at  0.5 ms pulse   width, impedance was  610ohms. The lead was sutured at the suture sleeves   the pectoralis minor muscle. There was no phrenic nerve stimulation   maximal output.    The right atrial lead was delivered to the RA via a peel- away 7Fr sheath   to the RAA region under fluoroscopic guidance. Position was confirmed in   the ALANA and PENN projections. The helix was deployed, and two way   communication was set up with the device interrogator. The sensing was   4.8mv, with threshold for atrial capture at 0.5V at  0.5 ms pulse width,   impedance was 410 ohms. The lead was sutured at the suture sleeves the   pectoralis minor muscle. There was no phrenic nerve stimulation maximal   output.    Both leads with an attached to the generator header and placed in a   preformed generator pocket.   A Tyrex antibiotic pouch was utilized  The incision was closed with 3 layers of suture. A 0 Vicryl interrupted   layer, followed by a 3.0 B. V-loc continuous layer, and a 4.0 V-loc   continuous layer for the subcuticular layer. Steri Strips and a dressing   were applied.    Summary  Successful dual chamber pacemaker implant.     Complications:   none    Stents/Implants:   Implants       Pacemaker    Lead, Pacemaker, Ultipace 58cm - [id number] - Implanted        Inventory item: LEAD, PACEMAKER, ULTIPACE  58CM Model/Cat number:   [id number]    Serial number: [id number] : [employer]    Lot number: [id number] Device identifier: [id number]    Implant Date: 1/23/2025        GUDID Information       Request  status Successful        Brand name: Russellace™ Version/Model: [id number]    Company name: [employer] MRI safety info as of 1/23/25: MR   Conditional    Contains dry or latex rubber: No      GMDN P.T. name: Endocardial/interventricular septal pacing lead                As of 1/23/2025       Status: Implanted                      Other Cardiac Implant    Envelope, Antibacterial, Aigis Rx Tyrx, Absorbable, Med - [id number] -   Implanted        Inventory item: ENVELOPE, ANTIBACTERIAL, AIGIS RX TYRX, ABSORBABLE, MED   Model/Cat number: [id number]    : [employer] Lot number: [id number]    Device identifier: [id number]        GUDID Information       Request status Successful        Brand name: TYRX™ Absorbable Antibacterial Envelope - Medium   Version/Model: [id number]    Company name: [employer] MRI safety info as of 1/23/25: Labeling   does not contain MRI Safety Information    Contains dry or latex rubber: No      GMDN P.T. name: Implantable pulse generator mesh bag, bioabsorbable                As of 1/23/2025       Status: Implanted                      Implant    Pacemaker, Generator, Dual Assurity Mri - [id number] - Implanted        Inventory item: PACEMAKER, GENERATOR, DUAL ASSURITY MRI Model/Cat number:   [id number]    Serial number: [id number] : [employer]    Lot number: [id number] Device identifier: [id number]      GUDID Information       Request status Successful        Brand name: Assurity MRI™ Version/Model: [id number]    Company name: [employer] MRI safety info as of 1/23/25: MR   Conditional    Contains dry or latex rubber: No      GMDN P.T. name: Dual-chamber implantable pacemaker, rate-responsive                As of 1/23/2025       Status: Implanted                            Anticoagulation/Antiplatelet Plan:   none    Estimated Blood Loss:   60 mL    Anesthesia: Moderate Sedation Anesthesia Staff: No anesthesia staff   entered.    Any Specimen(s) Removed:    No specimens collected during this procedure.    Disposition:   stable      Physical Exam    General: cooperating during physical exam.  HEENT: Pupils are equal and reactive to light and commendation , oral mucosa moist, no JVD.  Cardiovascular: PMI nondisplaced, S1-S2 normal, pacemaker in place on left side of chest  Lungs: Clear to auscultation bilaterally, no wheezing, no crackles, no dullness to percussion.  Abdomen: No hepatosplenomegaly appreciated, soft , not tender, positive bowel sounds, positive bowel movement.  Neuro: Alert and oriented x2, strength on right upper extremity and right lower extremity 4 out of 5   Musculoskeletal: Right great toe amputated,  Vascular: Pulses are intact in upper and lower extremities  Skin: Changes in lower extremities from venous stasis    Assessment/Plan        Second-degree AV block   Patient was transferred from [facility] to [facility] to be evaluated by electrophysiology.  Evaluated by Dr. Correa from electrophysiology services to evaluate him today  Monitor patient has intermittently secondary AV block  Status post pacemaker implant     Right sided weakness  Acute CVA  CT brain no acute intracranial finding  MRI brain revealed acute versus subacute infarct located in the left cerebral hemisphere in the territory PAULIE.  Patient was started on aspirin and Plavix.    Tele stroke neurology recommended to continue with aspirin and Plavix for 90 days and aspirin after.  Patient was evaluated by Dr. Nelson from neurology services.  Dr. Nelson on the case    History of  myositis  Rhabdomyolysis  CK trend down  Hold statins     Type 1 diabetes mellitus  Fairly controlled, hemoglobin A1c 8.1  Increase Lantus to 38 units q. bedtime, cover with insulin sliding scale  Monitor close diabetic diet   Accu-Chek before meals and at bedtime.     Morbid obesity  BMI 32.13   advised regarding diet.     Acute kidney injury  Creatinine improved  Most likely secondary to IV  contrast  Avoid nephrotoxic drugs  Patient was challenged with fluids  Waiting for Dr. Garcia to see him today    DVT prophylaxis     Deconditioning  PT /OT evaluated him and recommending high intensity level of care  Fall precaution  Ambulate with assistance     Multinodular thyroid  I will refer the patient to Dr. Loredo endocrinologist as outpatient  Patient understood importance of following up with endocrinology     Discussed with the nurse at bedside  Patient will need acute rehab  Labs pending today  CBC and BMP in AM.  Check CK in a.m.        Alton Lambert MD

## 2025-01-24 NOTE — PROGRESS NOTES
Occupational Therapy    Evaluation    Patient Name: Tony King  MRN: [id number]  Department: [facility]  Room: 01/01-B  Today's Date: 1/24/2025  Time Calculation  Start Time: 0758  Stop Time: 0815  Time Calculation (min): 17 min        Assessment:  OT Assessment: Pt presents on eval with generalized weakness and increased R sided weakness, cognitive deficits noted, new pacemaker precautions, poor activity tolerance, and poor standing balance affecting self-care and functional transfers/mobility. Pt will benefit from continued skilled OT to address these deficits and facilitate returning to functional baseline.  Prognosis: Good  Barriers to Discharge Home: Cognition needs, Physical needs, Caregiver assistance  Caregiver Assistance: Caregiver assistance needed per identified barriers - however, level of patient's required assistance exceeds assistance available at home  Cognition Needs: Recollection or understanding of precautions/restrictions limited, Insight of patient limited regarding functional ability/needs  Physical Needs: 24hr mobility assistance needed, 24hr ADL assistance needed, High falls risk due to function or environment  Evaluation/Treatment Tolerance: Patient limited by fatigue  End of Session Communication: Bedside nurse  End of Session Patient Position: Bed, 3 rail up, Alarm on  OT Assessment Results: Decreased ADL status, Decreased upper extremity range of motion, Decreased upper extremity strength, Decreased safe judgment during ADL, Decreased cognition, Decreased endurance, Decreased fine motor control, Decreased functional mobility, Decreased gross motor control, Decreased trunk control for functional activities    Plan:  Treatment Interventions: ADL retraining, Functional transfer training, UE strengthening/ROM, Endurance training, Cognitive reorientation, Patient/family training, Equipment evaluation/education, Neuromuscular reeducation, Fine motor coordination activities, Compensatory technique  education  OT Frequency: 5 times per week  OT Discharge Recommendations: High intensity level of continued care  OT Recommended Transfer Status: Assist of 2  OT - OK to Discharge: Yes      Subjective     General:  General  Reason for Referral: R sided weakness, impaired ADL's/mobility  Referred By: Alton Lambert MD  Past Medical History Relevant to Rehab: necrotizing myopathy, DM1, CKD, essential HTN, DVT, PE, TIA/CVA  Family/Caregiver Present: No  Co-Treatment: PT  Co-Treatment Reason: 2 skilled persons for safety with mobility  Prior to Session Communication: Bedside nurse  Patient Position Received: Bed, 3 rail up, Alarm on  General Comment: Pt is a 70 yo male admitted from [place] to [facility] due to heart block and underwent a pacemaker placement on 1/23/2025. MR brain revealed an acute to subacute infarct in the L hemisphere and pt presenting with R sided weakness and expressive aphasia.    Precautions:  Hearing/Visual Limitations: glasses  Medical Precautions: Fall precautions  Post-Surgical Precautions: Other (comment) (pacemaker precautions and pt currently with sling in place to LUE)    Pain:  Pain Assessment  Pain Assessment: 0-10  0-10 (Numeric) Pain Score: 0 - No pain    Objective     Cognition:  Overall Cognitive Status: Impaired  Orientation Level: Disoriented to place, Disoriented to time (Pt reported the correct year, but did not report the correct month.)  Following Commands: Follows one step commands with increased time  Safety Judgment: Decreased awareness of need for safety precautions  Cognition Comments: Pt presents with a flat affect, delayed overall processing, and expressive aphasia with difficulty using words intermittently.  Insight: Moderate  Processing Speed: Delayed    Home Living:  Type of Home: House  Lives With: Siblings (sister)  Home Adaptive Equipment: Walker rolling or standard, Cane, Other (Comment) (stair lift)  Home Layout: Multi-level, Laundry in  basement  Alternate Level Stairs-Rails: Both  Alternate Level Stairs-Number of Steps: 12  Home Access: Other (Comment) (3 entry stairs wih 1 railing)  Bathroom Shower/Tub: Walk-in shower  Bathroom Toilet: Standard  Bathroom Equipment: Grab bars in shower, Shower chair with back  Home Living Comments: Pt is a questionable historian when comparing his info. provided with the most recent 2 admits.    Prior Function:  Level of [place]: Independent with ADLs and functional transfers, Needs assistance with homemaking  Receives Help From: Other (Comment) (pts sister can assist if needed.  services and [facility] services)  ADL Assistance: Independent  Homemaking Assistance: Needs assistance  Ambulatory Assistance: Independent (mod indep using his RW)  Vocational: Retired  Hand Dominance: Right    ADL:  Eating Assistance: Minimal  Grooming Assistance: Moderate  Bathing Assistance: Maximal  UE Dressing Assistance: Moderate  LE Dressing Assistance: Total  LE Dressing Deficit: Don/doff R sock, Don/doff L sock  Toileting Assistance with Device: Total  Toileting Deficit: Incontinent    Activity Tolerance:  Activity Tolerance Comments: Poor+/Fair-    Bed Mobility/Transfers: Bed Mobility  Bed Mobility:  (Pt completed supine<>sit in bed with max A x2 for trunk up/down and moving his RLE mostly due to significant hip weakness.)    Transfers  Transfer:  (Pt completed sit<>stand with mod A x2(arm in arm assist) due to poor balance and R sided weakness.)    Functional Mobility:  Functional Mobility  Functional Mobility Performed:  (Pt only able to take a few lateral steps to the HOB with mod A x2(arm in arm assist) due to poor balance, difficulty moving BLE's, and R sided weakness.)    Sitting Balance:  Static Sitting Balance  Static Sitting-Balance Support: Bilateral upper extremity supported, Feet supported  Static Sitting-Level of Assistance: Contact guard, Close supervision    Standing Balance:  Static Standing Balance  Static  Standing-Balance Support: Bilateral upper extremity supported  Static Standing-Level of Assistance: Moderate assistance (x2)     Sensation:  Sensation Comment: pt denies paresthesias    Strength:  Strength Comments: RUE 2+/5 shoulder, 3+/5 distally (LUE shoulder NT, distally WFL)    Coordination:  Coordination Comment: ELVIA's delayed overall grossly     Hand Function:  Gross Grasp: Functional  Coordination: Impaired (R hand)      Outcome Measures:[facility] Daily Activity  Putting on and taking off regular lower body clothing: Total  Bathing (including washing, rinsing, drying): A lot  Putting on and taking off regular upper body clothing: A lot  Toileting, which includes using toilet, bedpan or urinal: Total  Taking care of personal grooming such as brushing teeth: A lot  Eating Meals: A little  Daily Activity - Total Score: 11      Education Documentation  No documentation found.  Education Comments  No comments found.      Goals:  Encounter Problems       Encounter Problems (Active)       OT Goals       Pt will complete self-feeding with mod indep. (Progressing)       Start:  01/24/25    Expected End:  02/24/25            Pt will complete all grooming tasks with close S to setup while seated. (Progressing)       Start:  01/24/25    Expected End:  02/24/25            Pt will complete UB dressing/bathing with setup and LB dressing/bathing with mod to min A using adaptive equipment as needed.  (Progressing)       Start:  01/24/25    Expected End:  02/24/25            Pt will complete all toileting tasks with mod to min A. (Progressing)       Start:  01/24/25    Expected End:  02/24/25            Pt will complete all functional transfers and mobility with min A using a RW. (Progressing)       Start:  01/24/25    Expected End:  02/24/25

## 2025-01-24 NOTE — PROGRESS NOTES
.Reason For Consult  Chronic kidney stage IIIb    History Of Present Illness  Tony King is a 69 y.o. male who is very well-known to my practice he has underlying chronic kidney disease stage III/IV [id number] at the end of 2023 his GFR was 27 mL/min with a creatinine level of 2.5 mg/dL the patient is known to have a history of diabetes mellitus type 2 and hypertension apparently the patient initially was admitted to [facility] because of persistent dizziness and weakness found to have second-degree heart block was transferred to [facility] was seen by EP he underwent pacemaker placement I was asked to see the patient because of his abnormal renal function with underlying chronic kidney disease patient feels better denies any specific complaints at this time     Review of Systems  10 points review of systems were done all negative except was positive for the history of present illness    Past Medical History  He has a past medical history of Chronic kidney disease, unspecified (09/26/2014), Essential (primary) hypertension (09/26/2014), History of deep venous thrombosis (12/22/2022), History of diabetes mellitus (04/05/2024), History of elevated lipids (04/05/2024), Patent foramen ovale (HHS-HCC) (09/26/2014), Personal history of other endocrine, nutritional and metabolic disease (09/26/2014), Personal history of other endocrine, nutritional and metabolic disease (09/26/2014), Personal history of other venous thrombosis and embolism (09/26/2014), Personal history of pulmonary embolism (12/22/2022), Personal history of transient ischemic attack (TIA), and 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    Surgical History  He has a past surgical history that includes US guided percutaneous placement (7/20/2020).     Social History  He reports that he has never smoked. He has never used smokeless tobacco. He reports that he does not drink  alcohol and does not use drugs.    Family History  No family history on file.     Current Facility-Administered Medications:     acetaminophen (Tylenol) tablet 650 mg, 650 mg, oral, q4h PRN **OR** acetaminophen (Tylenol) oral liquid 650 mg, 650 mg, nasogastric tube, q4h PRN **OR** acetaminophen (Tylenol) suppository 650 mg, 650 mg, rectal, q4h PRN, Alton Lambert MD    acetaminophen (Tylenol) tablet 650 mg, 650 mg, oral, q4h PRN **OR** acetaminophen (Tylenol) oral liquid 650 mg, 650 mg, oral, q4h PRN **OR** acetaminophen (Tylenol) suppository 650 mg, 650 mg, rectal, q4h PRN, Alton Lambert MD    amLODIPine (Norvasc) tablet 2.5 mg, 2.5 mg, oral, Daily, Alton Lambert MD, 2.5 mg at 01/24/25 1011    aspirin chewable tablet 81 mg, 81 mg, oral, Daily, Alton Lambert MD, 81 mg at 01/24/25 1010    clopidogrel (Plavix) tablet 75 mg, 75 mg, oral, Daily, Alton Lambert MD, 75 mg at 01/24/25 1011    dextrose 50 % injection 12.5 g, 12.5 g, intravenous, q15 min PRN, Alton Lambert MD    dextrose 50 % injection 25 g, 25 g, intravenous, q15 min PRN, Alton Lambert MD    [START ON 1/26/2025] ergocalciferol (Vitamin D-2) capsule 1,250 mcg, 1,250 mcg, oral, Every Sunday, Alton Lambert MD    famotidine (Pepcid) tablet 20 mg, 20 mg, oral, Daily, 20 mg at 01/24/25 1011 **OR** famotidine PF (Pepcid) injection 20 mg, 20 mg, intravenous, Daily, Alton Lambert MD    finasteride (Proscar) tablet 5 mg, 5 mg, oral, Daily, Alton Lambert MD, 5 mg at 01/24/25 1011    glucagon (Glucagen) injection 1 mg, 1 mg, intramuscular, q15 min PRN, Alton Lambert MD    glucagon (Glucagen) injection 1 mg, 1 mg, intramuscular, q15 min PRN, Alton Lambert MD    [Held by provider] heparin (porcine) injection 5,000 Units, 5,000 Units, subcutaneous, q12h, Alton Lambert MD    insulin glargine (Lantus) injection 38 Units, 38 Units, subcutaneous, q24h, Alton Lambert MD, 38 Units at 01/23/25 2059    insulin lispro injection  0-10 Units, 0-10 Units, subcutaneous, TID AC, Alton Lambert MD, 2 Units at 01/22/25 1813    melatonin tablet 3 mg, 3 mg, oral, Nightly PRN, Alton Lambert MD    metoprolol succinate XL (Toprol-XL) 24 hr tablet 50 mg, 50 mg, oral, BID, Enma Rivera, APRN-CNP, 50 mg at 01/24/25 1011    ondansetron (Zofran) tablet 4 mg, 4 mg, oral, q8h PRN **OR** ondansetron (Zofran) injection 4 mg, 4 mg, intravenous, q8h PRN, Alton Lambert MD    polyethylene glycol (Glycolax, Miralax) packet 17 g, 17 g, oral, Daily, Alton Lambert MD, 17 g at 01/24/25 1010    sennosides-docusate sodium (Linda-Colace) 8.6-50 mg per tablet 1 tablet, 1 tablet, oral, Nightly PRN, Alton Lambert MD    tamsulosin (Flomax) 24 hr capsule 0.4 mg, 0.4 mg, oral, Daily, Alton Lambert MD, 0.4 mg at 01/24/25 1010    traMADol (Ultram) tablet 50 mg, 50 mg, oral, q8h PRN, Alton Lambert MD   Allergies  Statins-hmg-coa reductase inhibitors, Ciprofloxacin, and Penicillins         Physical Exam  Physical Exam  Constitutional:       General: He is not in acute distress.     Appearance: He is not toxic-appearing.   HENT:      Head: Normocephalic and atraumatic.   Eyes:      Extraocular Movements: Extraocular movements intact.      Pupils: Pupils are equal, round, and reactive to light.   Neck:      Vascular: No carotid bruit.   Cardiovascular:      Rate and Rhythm: Normal rate and regular rhythm.   Pulmonary:      Effort: No respiratory distress.      Breath sounds: No stridor. No wheezing, rhonchi or rales.   Chest:      Chest wall: No tenderness.   Abdominal:      General: There is no distension.      Palpations: There is no mass.      Tenderness: There is no abdominal tenderness. There is no right CVA tenderness, left CVA tenderness or guarding.      Hernia: No hernia is present.   Musculoskeletal:         General: No swelling or tenderness.      Cervical back: No rigidity.      Right lower leg: No edema.      Left lower leg: No edema.    Lymphadenopathy:      Cervical: No cervical adenopathy.   Skin:     General: Skin is warm and dry.      Coloration: Skin is not jaundiced or pale.      Findings: No bruising or erythema.   Neurological:      General: No focal deficit present.      Mental Status: He is alert.              I&O 24HR    Intake/Output Summary (Last 24 hours) at 1/24/2025 1203  Last data filed at 1/24/2025 0001  Gross per 24 hour   Intake 200 ml   Output 1110 ml   Net -910 ml       Vitals 24HR  Heart Rate:  [66-80]   Temp:  [36 °C (96.8 °F)-36.6 °C (97.8 °F)]   Resp:  [14-18]   BP: (146-189)/(62-85)   Weight:  [114 kg (251 lb 12.3 oz)]   SpO2:  [94 %-100 %]     Relevant Results        Results for orders placed or performed during the hospital encounter of 01/22/25 (from the past 96 hours)   TSH with reflex to Free T4 if abnormal   Result Value Ref Range    Thyroid Stimulating Hormone 2.01 0.44 - 3.98 mIU/L   Creatine Kinase   Result Value Ref Range    Creatine Kinase 570 (H) 0 - 325 U/L   POCT GLUCOSE   Result Value Ref Range    POCT Glucose 180 (H) 74 - 99 mg/dL   Hemoglobin A1C   Result Value Ref Range    Hemoglobin A1C 8.1 (H) See comment %    Estimated Average Glucose 186 Not Established mg/dL   CBC   Result Value Ref Range    WBC 11.4 (H) 4.4 - 11.3 x10*3/uL    nRBC 0.0 0.0 - 0.0 /100 WBCs    RBC 4.73 4.50 - 5.90 x10*6/uL    Hemoglobin 14.0 13.5 - 17.5 g/dL    Hematocrit 41.6 41.0 - 52.0 %    MCV 88 80 - 100 fL    MCH 29.6 26.0 - 34.0 pg    MCHC 33.7 32.0 - 36.0 g/dL    RDW 14.3 11.5 - 14.5 %    Platelets 166 150 - 450 x10*3/uL   Comprehensive metabolic panel   Result Value Ref Range    Glucose 172 (H) 74 - 99 mg/dL    Sodium 134 (L) 136 - 145 mmol/L    Potassium 4.4 3.5 - 5.3 mmol/L    Chloride 107 98 - 107 mmol/L    Bicarbonate 22 21 - 32 mmol/L    Anion Gap 9 (L) 10 - 20 mmol/L    Urea Nitrogen 27 (H) 6 - 23 mg/dL    Creatinine 1.93 (H) 0.50 - 1.30 mg/dL    eGFR 37 (L) >60 mL/min/1.73m*2    Calcium 7.8 (L) 8.6 - 10.3 mg/dL     Albumin 3.3 (L) 3.4 - 5.0 g/dL    Alkaline Phosphatase 61 33 - 136 U/L    Total Protein 7.1 6.4 - 8.2 g/dL    AST 19 9 - 39 U/L    Bilirubin, Total 0.5 0.0 - 1.2 mg/dL    ALT 12 10 - 52 U/L   POCT GLUCOSE   Result Value Ref Range    POCT Glucose 212 (H) 74 - 99 mg/dL   POCT GLUCOSE   Result Value Ref Range    POCT Glucose 152 (H) 74 - 99 mg/dL   POCT GLUCOSE   Result Value Ref Range    POCT Glucose 155 (H) 74 - 99 mg/dL   POCT GLUCOSE   Result Value Ref Range    POCT Glucose 169 (H) 74 - 99 mg/dL   POCT GLUCOSE   Result Value Ref Range    POCT Glucose 160 (H) 74 - 99 mg/dL   POCT GLUCOSE   Result Value Ref Range    POCT Glucose 137 (H) 74 - 99 mg/dL   POCT GLUCOSE   Result Value Ref Range    POCT Glucose 126 (H) 74 - 99 mg/dL          Assessment/Plan     XR chest 2 views    Result Date: 1/24/2025  Interpreted By:  Maria Guadalupe Barragan, STUDY: XR CHEST 2 VIEWS 1/24/2025 9:20 am   INDICATION: Signs/Symptoms:post pacemaker insertion   COMPARISON: 01/18/2025   ACCESSION NUMBER(S): [id number]   ORDERING CLINICIAN: PUJA CARDENAS   TECHNIQUE: AP erect and lateral views of the chest were acquired.   FINDINGS: Utilizing a left subclavian approach, bipolar pacemaker leads have been inserted and terminate in the right atrium and right ventricle. There is no pneumothorax.   The cardiac size is normal. There are several areas of atelectasis in each lower lung zone.       No pneumothorax following bipolar pacemaker insertion with leads located in the right side of the heart.   Several areas of atelectasis in each lower lung field.   Signed by: Maria Guadalupe Barragan 1/24/2025 12:02 PM Dictation workstation:   [id number]    Electrophysiology procedure    Result Date: 1/23/2025  Table formatting from the original result was not included. Procedure Date: 1/23/2025 Attending:    * Eloy Correa - Primary Resident/Fellow/Other Assistant: Surgeons and Role: * No surgeons found with a matching role * Indications: Pre-op Diagnosis    * Generalized  weakness [R53.1]    * Bradycardia [R00.1]    * HB (heart block) [I45.9] Post-procedure diagnosis: Post-op Diagnosis    * Generalized weakness [R53.1]    * Bradycardia [R00.1]    * HB (heart block) [I45.9] Procedure(s):   * PPM IMPLANT DUAL Procedure Findings: See below Description of the Procedure: After written witnessed informed consent was obtained the procedure from the patient, the patient was transferred to the EP laboratory. The patient was in the fasting state. A grounding pad was placed. The patient was set up for monitoring of surface ECG. The left upper chest was prepped and draped in the usual sterile fashion. Bupivacaine was administered locally for anesthetic. A 4 cm incision was placed at the left deltopectoral groove. A prepectoral pocket was made to accommodate the device generator. Left subclavian vein access was obtained with a micropuncture kit for both leads The right ventricular lead was delivered to the RV via a peel away 7Fr sheath to the RV mid septal region under fluoroscopic guidance. Position was confirmed in the [place] and [employer] projections. The helix was deployed, and two way communication was set up with the device interrogator. The sensing was 7 mv, with threshold for ventricular capture at 0.75V at  0.5 ms pulse width, impedance was  610ohms. The lead was sutured at the suture sleeves the pectoralis minor muscle. There was no phrenic nerve stimulation maximal output. The right atrial lead was delivered to the RA via a peel- away 7Fr sheath to the RAA region under fluoroscopic guidance. Position was confirmed in the [place] and [employer] projections. The helix was deployed, and two way communication was set up with the device interrogator. The sensing was 4.8mv, with threshold for atrial capture at 0.5V at  0.5 ms pulse width, impedance was 410 ohms. The lead was sutured at the suture sleeves the pectoralis minor muscle. There was no phrenic nerve stimulation maximal output. Both leads with an  attached to the generator header and placed in a preformed generator pocket. A Tyrex antibiotic pouch was utilized The incision was closed with 3 layers of suture. A 0 Vicryl interrupted layer, followed by a 3.0 B. V-loc continuous layer, and a 4.0 V-loc continuous layer for the subcuticular layer. Steri Strips and a dressing were applied. Summary Successful dual chamber pacemaker implant. Complications: none Stents/Implants: Implants   Pacemaker  Lead, Pacemaker, Ultipace 58cm - [id number] - Implanted    Inventory item: LEAD, PACEMAKER, ULTIPACE  58CM Model/Cat number: [id number]  Serial number: [id number] : [employer]  Lot number: [id number] Device identifier: [id number]  Implant Date: 1/23/2025    GUDID Information   Request status Successful    Brand name: UltiPace™ Version/Model: [id number]  Company name: [employer] MRI safety info as of 1/23/25: MR Conditional  Contains dry or latex rubber: No    GMDN P.T. name: Endocardial/interventricular septal pacing lead      As of 1/23/2025   Status: Implanted        Other Cardiac Implant  Envelope, Antibacterial, Aigis Rx Tyrx, Absorbable, Med - [id number] - Implanted    Inventory item: ENVELOPE, ANTIBACTERIAL, AIGIS RX TYRX, ABSORBABLE, MED Model/Cat number: [id number]  : [employer] Lot number: [id number]  Device identifier: [id number]    GUDID Information   Request status Successful    Brand name: TYRX™ Absorbable Antibacterial Envelope - Medium Version/Model: [id number]  Company name: [employer] MRI safety info as of 1/23/25: Labeling does not contain MRI Safety Information  Contains dry or latex rubber: No    GMDN P.T. name: Implantable pulse generator mesh bag, bioabsorbable      As of 1/23/2025   Status: Implanted        Implant  Pacemaker, Generator, Dual Assurity Mri - [id number] - Implanted    Inventory item: PACEMAKER, GENERATOR, DUAL ASSURITY MRI Model/Cat number: [id number]  Serial number: [id number] : [employer]  Lot number: [id number] Device identifier: [id number]  GUDID Information   Request status Successful    Brand name: Chao MRI™ Version/Model: RG3170  Company name: [employer] MRI safety info as of 1/23/25: MR Conditional  Contains dry or latex rubber: No    GMDN P.T. name: Dual-chamber implantable pacemaker, rate-responsive      As of 1/23/2025   Status: Implanted        Anticoagulation/Antiplatelet Plan: none Estimated Blood Loss: 60 mL Anesthesia: Moderate Sedation Anesthesia Staff: No anesthesia staff entered. Any Specimen(s) Removed: No specimens collected during this procedure. Disposition: stable        Assessment/plan:  Chronic kidney disease stage IIIb patient is better baseline creatinine at this time however his renal function was done last on the 22nd the labs for today are still pending at this point I do not recommend any further workup continue current management and check his labs from today if they are stable he is okay to be discharged from renal point of view to follow-up with me in the office in few weeks.  Second-degree AV block status post pacemaker placement  Diabetes mellitus type II  Acute versus subacute cerebral infarct seen by neurology he is on aspirin and Plavix    Garrett Garcia MD

## 2025-01-24 NOTE — PROGRESS NOTES
"Tony King is a 69 y.o. male on day 2 of admission presenting with Bradycardia.    Subjective   Post dual chamber pacemaker implant ([place]).       Objective     Physical Exam  Constitutional:       Appearance: Normal appearance.   Cardiovascular:      Rate and Rhythm: Normal rate and regular rhythm.      Heart sounds: No murmur heard.     No friction rub. No gallop.   Pulmonary:      Effort: Pulmonary effort is normal.      Breath sounds: Normal breath sounds.   Abdominal:      Palpations: Abdomen is soft.   Musculoskeletal:      Cervical back: Neck supple.   Neurological:      Mental Status: He is alert.   Psychiatric:         Mood and Affect: Mood normal.         Behavior: Behavior normal.          Last Recorded Vitals  Blood pressure 164/62, pulse 66, temperature 36.1 °C (97 °F), temperature source Temporal, resp. rate 16, height 1.956 m (6' 5\"), weight 114 kg (251 lb 12.3 oz), SpO2 98%.  Intake/Output last 3 Shifts:  I/O last 3 completed shifts:  In: 200 (1.8 mL/kg) [IV Piggyback:200]  Out: 2010 (17.6 mL/kg) [Urine:1950 (0.5 mL/kg/hr); Blood:60]  Weight: 114.2 kg     Relevant Results                     Assessment/Plan   Assessment & Plan  Bradycardia    Generalized weakness    HB (heart block)    1/23 - 69-year-old male with underlying conduction disease seen on ECG, maintained on high-dose beta-blocker for hypertension presents with lightheadedness dizziness telemetry with intermittent A-V dissociation, episode of unresponsiveness with noted junctional escape and A-V dissociation though no hypotension, intermittent episodes persisting after no beta-blocker for 48 hours.   Review of above symptomatic bradycardia, AV dissociation after 48 hours off beta-blocker class I indication for permanent pacemaker.  Echocardiogram with normal left ventricular function LVH, no valvular study, bubble not done regarding PFO though in the past has been unremarkable, will plan for device implantation today.  Vancomycin " preprocedure,  postprocedure will utilize metoprolol over labetalol for LVH, no longer on ARB for hypertension secondary to labile renal function can utilize beta-blocker, increase as needed, continue aspirin and clopidogrel related to CVA.   Usual device interrogation chest x-ray in a.m.      1/24 -     Patient underwent dual chamber pacemaker implant ([place]) via left subclavian vein access yesterday with Dr Correa. This morning, device check showed normal sensing, thresholds and impedances. CXR showing no pneumothorax, leads in position. I removed his pressure dressing, no signs of significant hematoma, no tenderness or oozing form the incision site. Patient to follow up with Dr. Correa's office for wound check and device clinic follow up.             Dhara Johnson MD

## 2025-01-24 NOTE — CARE PLAN
The patient's goals for the shift include      The clinical goals for the shift include Patienbt will have pain controlled this shift    Over the shift, the patient did not make progress toward the following goals. Barriers to progression include the patient. Recommendations to address these barriers include education to patient on pain regime.

## 2025-01-24 NOTE — CARE PLAN
The patient's goals for the shift include increase mobility     The clinical goals for the shift include protect skin    Over the shift, the patient did not make progress toward the following goals. Barriers to progression include pt incontinent of bowel and bladder. Recommendations to address these barriers include hourly rounding checks.

## 2025-01-24 NOTE — PROGRESS NOTES
01/24/25 1122   Discharge Planning   Expected Discharge Disposition Rehab     Per previous TCC, patient's insurance will only cover a [facility] re[facility] facility or [facility].  Family provided choice of [facility]. Updated clinicals and PT/OT evals sent in [facility] this morning.  [facility] is reviewing for acceptance.  A precert will need to be started once acceptance has been established.     DISCHARGE PLAN IS NOT SECURE

## 2025-01-24 NOTE — NURSING NOTE
Assumed pt care 0700. Pt was awake , sitting up in bed. Phlebotomist attempting to get labs. Attempts noted to RN x3 and pt started refusing. AM labs still needed. Pt does not have IV access as of now, will re-establish w/ IV team. PT/OT working w/ pt now

## 2025-01-24 NOTE — PROGRESS NOTES
Physical Therapy    Physical Therapy Evaluation & Treatment    Patient Name: Tony King  MRN: [id number]  Department: [address]  Room: 01/01-B  Today's Date: 1/24/2025   Time Calculation  Start Time: 0802  Stop Time: 0830  Time Calculation (min): 28 min    Assessment/Plan   PT Assessment  PT Assessment Results: Decreased strength, Impaired balance, Decreased mobility, Decreased coordination, Impaired judgement, Decreased safety awareness  Rehab Prognosis: Good  Barriers to Discharge Home: Physical needs  Physical Needs: 24hr mobility assistance needed  Evaluation/Treatment Tolerance: Patient tolerated treatment well  Medical Staff Made Aware: Yes  Barriers to Participation: Comorbidities  End of Session Communication: Bedside nurse  Assessment Comment: pt would benefit from intense skilled therapy services.  End of Session Patient Position: Bed, 3 rail up, Alarm on  IP OR SWING BED PT PLAN  Inpatient or Swing Bed: Inpatient  PT Plan  Treatment/Interventions: Bed mobility, Transfer training, Gait training, Stair training, Balance training, Neuromuscular re-education, Strengthening, Endurance training, Therapeutic exercise  PT Plan: Ongoing PT  PT Frequency: 5 times per week  PT Discharge Recommendations: High intensity level of continued care  PT Recommended Transfer Status: Assist x2  PT - OK to Discharge: Yes    Subjective   General Visit Information:  General  Reason for Referral: pt is a 70 y/o male admitted  with bradycardia and heart block. s/p permanent pacemaker placement 1/23/25. MR brain revealed Acute or subacute infarcts located within the left cerebral  hemisphere in the territory of the PAULIE.  impaired mobility  Referred By: Alton Lambert MD  Past Medical History Relevant to Rehab: necrotizing myopathy, DM1, CKD, essential HTN, DVT, PE, TIA/CVA; spinal stenosis; hyperlipidemia; OA  Family/Caregiver Present: No  Co-Treatment: OT  Co-Treatment Reason: 2 skilled persons for safety with mobility  Prior to  Session Communication: Bedside nurse  Patient Position Received: Bed, 3 rail up, Alarm on  General Comment: pt soft spoken and presented with flat affect and aphasia; pt able to follow simple commands with cues      Home Living:  Home Living  Type of Home: House  Lives With:  (sister)  Home Adaptive Equipment:  (rolling walker and cane)  Home Layout: Multi-level, Laundry in basement  Home Access:  (3 entry stairs wih 1 railing)  Bathroom Shower/Tub: Walk-in shower  Bathroom Equipment: Grab bars in shower, Shower chair with back  Home Living Comments: pt has 13 stairs with 1 railng  and a stairlift to bedroom and bathroom. pt also has 13 stairs with 1 railing to basement laundry - BUT DOES NOT USE      Prior Level of Function:  Prior Function Per Pt/Caregiver Report  Level of [place]: Independent with ADLs and functional transfers  Receives Help From:  (pts sister can assist if needed.  services and [facility] services)  ADL Assistance: Independent  Homemaking Assistance: Needs assistance  Ambulatory Assistance: Independent (using rolling walker)  Vocational: Retired  Prior Function Comments: pt reported managing well at home; pt is a questionable historian. pts sister provides transportation      Precautions:  Precautions  Hearing/Visual Limitations: glasses  Medical Precautions: Fall precautions  Post-Surgical Precautions:  (pacemaker precautions)  Precautions Comment: educated pt on safety techniques during mobility; + sling to L UE             Objective   Pain:  Pain Assessment  Pain Assessment:  (0/10)  Cognition:  Cognition  Overall Cognitive Status: Impaired  Orientation Level: Disoriented to place, Disoriented to time  Following Commands: Follows one step commands with repetition  Safety Judgment:  (impaired safety awareness)  Insight: Moderate  Processing Speed: Delayed    General Assessments:    Activity Tolerance  Endurance:  (FAIR+ activity tolerance)    Sensation  Sensation Comment: denies any  numbness/tingling    Coordination  Coordination Comment: slow movement in Right UE/LE    Postural Control  Posture Comment: flexed posture, rounded shoulders    Static Sitting Balance  Static Sitting-Comment/Number of Minutes: GOOD-  Dynamic Sitting Balance  Dynamic Sitting-Comments: GOOD-    Static Standing Balance  Static Standing-Comment/Number of Minutes: FAIR+  Dynamic Standing Balance  Dynamic Standing-Comments: FAIR      Functional Assessments:  Bed Mobility  Bed Mobility:  (supine <-> sit with MAX A x 2 for trunk, B LE and scooting. pt slow to move witha delay in motor processing observed. transferred pt back to supine after session.)    Transfers  Transfer:  (sit <-> stand with MOD A x 2/Handheld A x 2; increased time and effort noted. flexed forward posture noted. VC to stand more erect)    Ambulation/Gait Training  Ambulation/Gait Training Performed:  (pt took 6-8 short lateral inconsistent steps with MOD A x 2/Handheld A x 2 to steady. pt had difficulty with weight shifting. mild postural sway noted.)       Extremity/Trunk Assessments:  RUE   RUE : Within Functional Limits  LUE   LUE: Within Functional Limits  RLE   RLE :  (WFL with 3/5 strength; limited active movement to DF and hip flexion.)  LLE   LLE :  (WFL with 3+/5 strength)      Outcome Measures:  [facility] Basic Mobility  Turning from your back to your side while in a flat bed without using bedrails: Total  Moving from lying on your back to sitting on the side of a flat bed without using bedrails: Total  Moving to and from bed to chair (including a wheelchair): A lot  Standing up from a chair using your arms (e.g. wheelchair or bedside chair): A lot  To walk in hospital room: A lot  Climbing 3-5 steps with railing: Total  Basic Mobility - Total Score: 9    Encounter Problems       Encounter Problems (Active)       Mobility       STG - Patient will ambulate 40' x 1 using rolling walker with CGA (Progressing)       Start:  01/24/25    Expected End:   02/07/25            STG - Patient will negotiate 3 stairs with 1 railing and MIN A (Progressing)       Start:  01/24/25    Expected End:  02/07/25               PT Transfers       STG - Patient to transfer to and from sit to supine with CGA (Progressing)       Start:  01/24/25    Expected End:  02/07/25            STG - Patient will transfer sit to and from stand with MIN A (Progressing)       Start:  01/24/25    Expected End:  02/07/25               Pain - Adult              Education Documentation      Precautions, taught by Vidal García, PT at 1/24/2025 10:10 AM.  Learner: Patient  Readiness: Eager  Method: Explanation  Response: Verbalizes Understanding, Needs Reinforcement    Body Mechanics, taught by Vidal García, PT at 1/24/2025 10:10 AM.  Learner: Patient  Readiness: Eager  Method: Explanation  Response: Verbalizes Understanding, Needs Reinforcement      Mobility Training, taught by Vidal García, PT at 1/24/2025 10:10 AM.  Learner: Patient  Readiness: Eager  Method: Explanation  Response: Verbalizes Understanding, Needs Reinforcement    Education Comments  No comments found.

## 2025-01-25 LAB
ALBUMIN SERPL BCP-MCNC: 3.2 G/DL (ref 3.4–5)
ANION GAP SERPL CALCULATED.3IONS-SCNC: 8 MMOL/L (ref 10–20)
BUN SERPL-MCNC: 26 MG/DL (ref 6–23)
CALCIUM SERPL-MCNC: 8.2 MG/DL (ref 8.6–10.3)
CHLORIDE SERPL-SCNC: 109 MMOL/L (ref 98–107)
CO2 SERPL-SCNC: 24 MMOL/L (ref 21–32)
CREAT SERPL-MCNC: 1.76 MG/DL (ref 0.5–1.3)
EGFRCR SERPLBLD CKD-EPI 2021: 41 ML/MIN/1.73M*2
ERYTHROCYTE [DISTWIDTH] IN BLOOD BY AUTOMATED COUNT: 13.6 % (ref 11.5–14.5)
GLUCOSE BLD MANUAL STRIP-MCNC: 126 MG/DL (ref 74–99)
GLUCOSE BLD MANUAL STRIP-MCNC: 183 MG/DL (ref 74–99)
GLUCOSE BLD MANUAL STRIP-MCNC: 196 MG/DL (ref 74–99)
GLUCOSE BLD MANUAL STRIP-MCNC: 78 MG/DL (ref 74–99)
GLUCOSE SERPL-MCNC: 138 MG/DL (ref 74–99)
HCT VFR BLD AUTO: 42.7 % (ref 41–52)
HGB BLD-MCNC: 14.4 G/DL (ref 13.5–17.5)
MCH RBC QN AUTO: 29 PG (ref 26–34)
MCHC RBC AUTO-ENTMCNC: 33.7 G/DL (ref 32–36)
MCV RBC AUTO: 86 FL (ref 80–100)
NRBC BLD-RTO: 0 /100 WBCS (ref 0–0)
PHOSPHATE SERPL-MCNC: 2.6 MG/DL (ref 2.5–4.9)
PLATELET # BLD AUTO: 170 X10*3/UL (ref 150–450)
POTASSIUM SERPL-SCNC: 4.2 MMOL/L (ref 3.5–5.3)
RBC # BLD AUTO: 4.96 X10*6/UL (ref 4.5–5.9)
SODIUM SERPL-SCNC: 137 MMOL/L (ref 136–145)
WBC # BLD AUTO: 8.2 X10*3/UL (ref 4.4–11.3)

## 2025-01-25 PROCEDURE — 2060000001 HC INTERMEDIATE ICU ROOM DAILY

## 2025-01-25 PROCEDURE — 82947 ASSAY GLUCOSE BLOOD QUANT: CPT

## 2025-01-25 PROCEDURE — 85027 COMPLETE CBC AUTOMATED: CPT | Performed by: INTERNAL MEDICINE

## 2025-01-25 PROCEDURE — 2500000001 HC RX 250 WO HCPCS SELF ADMINISTERED DRUGS (ALT 637 FOR MEDICARE OP): Performed by: REGISTERED NURSE

## 2025-01-25 PROCEDURE — 2500000002 HC RX 250 W HCPCS SELF ADMINISTERED DRUGS (ALT 637 FOR MEDICARE OP, ALT 636 FOR OP/ED): Performed by: INTERNAL MEDICINE

## 2025-01-25 PROCEDURE — 2500000004 HC RX 250 GENERAL PHARMACY W/ HCPCS (ALT 636 FOR OP/ED): Performed by: INTERNAL MEDICINE

## 2025-01-25 PROCEDURE — 80069 RENAL FUNCTION PANEL: CPT | Performed by: INTERNAL MEDICINE

## 2025-01-25 PROCEDURE — 99239 HOSP IP/OBS DSCHRG MGMT >30: CPT | Performed by: INTERNAL MEDICINE

## 2025-01-25 PROCEDURE — 2500000001 HC RX 250 WO HCPCS SELF ADMINISTERED DRUGS (ALT 637 FOR MEDICARE OP): Performed by: INTERNAL MEDICINE

## 2025-01-25 PROCEDURE — 36415 COLL VENOUS BLD VENIPUNCTURE: CPT | Performed by: INTERNAL MEDICINE

## 2025-01-25 RX ORDER — INSULIN DETEMIR 100 [IU]/ML
35 INJECTION, SOLUTION SUBCUTANEOUS NIGHTLY
Start: 2025-01-25 | End: 2025-02-24

## 2025-01-25 RX ORDER — METOPROLOL SUCCINATE 50 MG/1
50 TABLET, EXTENDED RELEASE ORAL 2 TIMES DAILY
Start: 2025-01-25 | End: 2025-02-24

## 2025-01-25 RX ORDER — ACETAMINOPHEN 325 MG/1
650 TABLET ORAL EVERY 6 HOURS PRN
Start: 2025-01-25 | End: 2025-02-01

## 2025-01-25 RX ORDER — TRAMADOL HYDROCHLORIDE 50 MG/1
25 TABLET ORAL EVERY 8 HOURS PRN
Qty: 5 TABLET | Refills: 0 | Status: SHIPPED | OUTPATIENT
Start: 2025-01-25 | End: 2025-01-28

## 2025-01-25 RX ORDER — FAMOTIDINE 20 MG/1
20 TABLET, FILM COATED ORAL DAILY
Start: 2025-01-25 | End: 2025-02-24

## 2025-01-25 RX ADMIN — ASPIRIN 81 MG: 81 TABLET, CHEWABLE ORAL at 09:40

## 2025-01-25 RX ADMIN — INSULIN LISPRO 2 UNITS: 100 INJECTION, SOLUTION INTRAVENOUS; SUBCUTANEOUS at 12:37

## 2025-01-25 RX ADMIN — FAMOTIDINE 20 MG: 20 TABLET, FILM COATED ORAL at 09:40

## 2025-01-25 RX ADMIN — FINASTERIDE 5 MG: 5 TABLET, FILM COATED ORAL at 09:40

## 2025-01-25 RX ADMIN — INSULIN GLARGINE 38 UNITS: 100 INJECTION, SOLUTION SUBCUTANEOUS at 20:18

## 2025-01-25 RX ADMIN — CLOPIDOGREL BISULFATE 75 MG: 75 TABLET ORAL at 09:40

## 2025-01-25 RX ADMIN — AMLODIPINE BESYLATE 2.5 MG: 2.5 TABLET ORAL at 09:40

## 2025-01-25 RX ADMIN — METOPROLOL SUCCINATE 50 MG: 50 TABLET, EXTENDED RELEASE ORAL at 20:18

## 2025-01-25 RX ADMIN — METOPROLOL SUCCINATE 50 MG: 50 TABLET, EXTENDED RELEASE ORAL at 09:40

## 2025-01-25 RX ADMIN — POLYETHYLENE GLYCOL 3350 17 G: 17 POWDER, FOR SOLUTION ORAL at 09:40

## 2025-01-25 RX ADMIN — TAMSULOSIN HYDROCHLORIDE 0.4 MG: 0.4 CAPSULE ORAL at 09:40

## 2025-01-25 ASSESSMENT — COGNITIVE AND FUNCTIONAL STATUS - GENERAL
MOVING TO AND FROM BED TO CHAIR: A LOT
TOILETING: TOTAL
TURNING FROM BACK TO SIDE WHILE IN FLAT BAD: A LOT
DRESSING REGULAR LOWER BODY CLOTHING: TOTAL
MOVING FROM LYING ON BACK TO SITTING ON SIDE OF FLAT BED WITH BEDRAILS: A LOT
STANDING UP FROM CHAIR USING ARMS: TOTAL
DRESSING REGULAR UPPER BODY CLOTHING: TOTAL
WALKING IN HOSPITAL ROOM: TOTAL
DAILY ACTIVITIY SCORE: 9
PERSONAL GROOMING: TOTAL
HELP NEEDED FOR BATHING: TOTAL
MOBILITY SCORE: 9
CLIMB 3 TO 5 STEPS WITH RAILING: TOTAL

## 2025-01-25 ASSESSMENT — PAIN - FUNCTIONAL ASSESSMENT: PAIN_FUNCTIONAL_ASSESSMENT: 0-10

## 2025-01-25 ASSESSMENT — PAIN SCALES - GENERAL
PAINLEVEL_OUTOF10: 0 - NO PAIN
PAINLEVEL_OUTOF10: 0 - NO PAIN

## 2025-01-25 NOTE — CARE PLAN
The patient's goals for the shift include rest     The clinical goals for the shift include Q2 turns     Over the shift, the patient did not make progress toward the following goals. Barriers to progression include ***. Recommendations to address these barriers include ***.

## 2025-01-25 NOTE — DISCHARGE SUMMARY
"Discharge Diagnosis  Bradycardia    Issues Requiring Follow-Up  Kidney disease  Second-degree AV block  Acute CVA  Hypertension      Discharge Meds     Medication List      START taking these medications     acetaminophen 325 mg tablet; Commonly known as: Tylenol; Take 2 tablets   (650 mg) by mouth every 6 hours if needed for fever (temp greater than   38.0 C) (greater than or equal to 38 C) for up to 7 days.   famotidine 20 mg tablet; Commonly known as: Pepcid; Take 1 tablet (20   mg) by mouth once daily.   metoprolol succinate XL 50 mg 24 hr tablet; Commonly known as:   Toprol-XL; Take 1 tablet (50 mg) by mouth 2 times a day. Do not crush or   chew.     CHANGE how you take these medications     Levemir FlexTouch U100 Insulin 100 unit/mL (3 mL) pen; Generic drug:   insulin detemir; Inject 35 Units under the skin once daily at bedtime.   Take as directed per insulin instructions.; What changed: how much to   take, when to take this   traMADol 50 mg tablet; Commonly known as: Ultram; Take 0.5 tablets (25   mg) by mouth every 8 hours if needed for severe pain (7 - 10) for up to 3   days.; What changed: when to take this, reasons to take this     CONTINUE taking these medications     amLODIPine 2.5 mg tablet; Commonly known as: Norvasc   aspirin 81 mg chewable tablet; Chew 1 tablet (81 mg) once daily.   BD Ultra-Fine Orig Pen Needle 29 gauge x 1/2\" needle; Generic drug: pen   needle 1/2\"   clopidogrel 75 mg tablet; Commonly known as: Plavix; Take 1 tablet (75   mg) by mouth once daily.   Colace 100 mg capsule; Generic drug: docusate sodium   Contour Next Test Strips strip; Generic drug: blood sugar diagnostic   Dexcom G6  misc; Generic drug: blood-glucose meter,continuous   Dexcom G6 Sensor device; Generic drug: blood-glucose sensor   Dexcom G6 Transmitter device; Generic drug: blood-glucose transmitter   device   ergocalciferol 1.25 MG (44529 UT) capsule; Commonly known as: Vitamin   D-2; Take 1 capsule (1,250 " mcg) by mouth 1 (one) time per week.   finasteride 5 mg tablet; Commonly known as: Proscar; Take 1 tablet (5   mg) by mouth once daily.   insulin lispro 100 unit/mL injection; Commonly known as: HumaLOG KwikPen   Insulin; Inject 20 Units under the skin once daily with breakfast AND 20   Units once daily at noon. Take with meals AND 20 Units once daily in the   evening. Take with meals. Take as directed per insulin instructions.   mirabegron 50 mg tablet extended release 24 hr 24 hr tablet; Commonly   known as: Myrbetriq   polyethylene glycol 1 gram packet; Commonly known as: Glycolax, Miralax   sennosides-docusate sodium 8.6-50 mg tablet; Commonly known as:   Linda-Colace   tamsulosin 0.4 mg 24 hr capsule; Commonly known as: Flomax; Take 1   capsule (0.4 mg) by mouth once daily.     STOP taking these medications     glucagon 1 mg injection; Commonly known as: Glucagen   HYDROcodone-acetaminophen 5-325 mg tablet; Commonly known as: Norco   irbesartan 300 mg tablet; Commonly known as: Avapro   ketoconazole 2 % cream; Commonly known as: NIZOral   KETOROLAC ORAL   labetalol 100 mg tablet; Commonly known as: Normodyne   meclizine 25 mg tablet; Commonly known as: Antivert   pantoprazole 40 mg EC tablet; Commonly known as: ProtoNix       Test Results Pending At Discharge  Pending Labs       No current pending labs.            Hospital Course    Patient is a 69 years old male with past medical history of hypertension, chronic kidney disease.  Patient presented to [facility] with generalized weakness.  He has history of necrotizing myositis which has required immunotherapy in the past.  Patient is not able to take statins.  During hospital stay at [facility] patient became bradycardic, rapid response was called.  He was found to have second-degree AV block.  CAT scan of the brain did not reveal acute intracranial findings CTA head and neck no evidence of large vessel occlusion.  He is found to have multinodular thyroid  gland.  MRI brain revealed acute versus subacute infarct located within the left cerebral hemisphere in the territory of PAULIE.  Patient was evaluated by Dr. Nelson.  Neurology recommended to continue with aspirin and Plavix.  Advised patient to follow-up with Dr. Nelson in 3 weeks.  He was transferred to [facility] for pacemaker implant.  Patient was evaluated by Dr. Correa, from electrophysiology.  Patient had pacemaker implant  Patient has chronic kidney disease.  Dr. Garcia was consulted from nephrology services.  Patient was started on metoprolol by EP.  He has history of diabetes on insulin.  Dose of Levemir decreased.  Patient was evaluated by physical therapy.  Per physical therapy patient is high intensity level of care.  Patient is clinically and hemodynamically stable to get discharged to acute rehab versus skilled nursing facility.    Pertinent Physical Exam At Time of Discharge  Physical Exam  General: In non acute distress, cooperating during physical exam.  HEENT: Pupils are equal and reactive to light and commendation , oral mucosa moist, no JVD .  Cardiovascular: PMI nondisplaced, pacemaker in place on her left side of the chest  Lungs: Clear to auscultation bilaterally, no wheezing, no crackles, no dullness to percussion.  Abdomen: No hepatosplenomegaly appreciated, soft , not tender, positive bowel sounds, positive bowel movement.  Neuro: Alert and oriented x2, strength in upper and lower extremities , sensation intact.  Psych: Patient had great insight was going on  Musculoskeletal: No swelling in lower extremities, no limitation in range of motion.  Vascular: Pulses are intact in upper and lower extremities  Skin: No petechiae, ecchymosis or other stigmata for dermatology disease.     Outpatient Follow-Up  Future Appointments   Date Time Provider Department Center   2/11/2025  8:45 AM [id number]     Follow-up with nephrology in 4  weeks  Follow-up with Dr. Nelson in 3 weeks  Follow-up with Dr. Correa in 2 weeks  Follow-up with primary care physician in 2 weeks    Time spent discharging patient 42 minutes.      Alton Lambert MD

## 2025-01-25 NOTE — CARE PLAN
The patient's goals for the shift include  rest     The clinical goals for the shift include turn and reposition Q2    Over the shift, the patient did not make progress toward the following goals. Barriers to progression include pt does not like turning wedges. Recommendations to address these barriers include attempting to float w/ pillows.

## 2025-01-25 NOTE — CARE PLAN
The patient's goals for the shift include      The clinical goals for the shift include Patient will have pain controlled this shift    Over the shift, the patient did not make progress toward the following goals. Barriers to progression include thee patient. Recommendations to address these barriers include educate patient on pain regime.

## 2025-01-25 NOTE — PROGRESS NOTES
"Tony King is a 69 y.o. male on day 3 of admission presenting with Bradycardia.    Subjective   Patient is seen for chronically stage IIIb admitted with secondary heart block had pacemaker placement also had stroke he feels fine he has no complaints       Objective     Physical Exam  Neck:      Vascular: No carotid bruit.   Cardiovascular:      Rate and Rhythm: Normal rate and regular rhythm.      Heart sounds: No murmur heard.     No friction rub. No gallop.   Pulmonary:      Breath sounds: No wheezing, rhonchi or rales.   Chest:      Chest wall: No tenderness.   Abdominal:      General: There is no distension.      Tenderness: There is no abdominal tenderness. There is no guarding or rebound.   Musculoskeletal:         General: No swelling or tenderness.      Cervical back: Neck supple.      Right lower leg: No edema.      Left lower leg: No edema.   Lymphadenopathy:      Cervical: No cervical adenopathy.         Last Recorded Vitals  Blood pressure 166/73, pulse 60, temperature 36.2 °C (97.2 °F), temperature source Temporal, resp. rate 18, height 1.956 m (6' 5\"), weight 111 kg (244 lb 11.4 oz), SpO2 97%.    Intake/Output last 3 Shifts:  I/O last 3 completed shifts:  In: 240 (2.2 mL/kg) [P.O.:240]  Out: 1650 (14.9 mL/kg) [Urine:1650 (0.4 mL/kg/hr)]  Weight: 111 kg     Current Facility-Administered Medications:     acetaminophen (Tylenol) tablet 650 mg, 650 mg, oral, q4h PRN **OR** acetaminophen (Tylenol) oral liquid 650 mg, 650 mg, nasogastric tube, q4h PRN **OR** acetaminophen (Tylenol) suppository 650 mg, 650 mg, rectal, q4h PRN, Alton Lambert MD    acetaminophen (Tylenol) tablet 650 mg, 650 mg, oral, q4h PRN **OR** acetaminophen (Tylenol) oral liquid 650 mg, 650 mg, oral, q4h PRN **OR** acetaminophen (Tylenol) suppository 650 mg, 650 mg, rectal, q4h PRN, Alton Lambert MD    amLODIPine (Norvasc) tablet 2.5 mg, 2.5 mg, oral, Daily, Alton Lambert MD, 2.5 mg at 01/24/25 1011    aspirin chewable tablet " 81 mg, 81 mg, oral, Daily, Alton Lambert MD, 81 mg at 01/24/25 1010    clopidogrel (Plavix) tablet 75 mg, 75 mg, oral, Daily, Alton Lambert MD, 75 mg at 01/24/25 1011    dextrose 50 % injection 12.5 g, 12.5 g, intravenous, q15 min PRN, Alton Lambert MD    dextrose 50 % injection 25 g, 25 g, intravenous, q15 min PRN, Alton Lambert MD    [START ON 1/26/2025] ergocalciferol (Vitamin D-2) capsule 1,250 mcg, 1,250 mcg, oral, Every Sunday, Alton Lambert MD    famotidine (Pepcid) tablet 20 mg, 20 mg, oral, Daily, 20 mg at 01/24/25 1011 **OR** famotidine PF (Pepcid) injection 20 mg, 20 mg, intravenous, Daily, Alton Lambert MD    finasteride (Proscar) tablet 5 mg, 5 mg, oral, Daily, Alton Lambert MD, 5 mg at 01/24/25 1011    glucagon (Glucagen) injection 1 mg, 1 mg, intramuscular, q15 min PRN, Alton Lambert MD    glucagon (Glucagen) injection 1 mg, 1 mg, intramuscular, q15 min PRN, Alton Lambert MD    [Held by provider] heparin (porcine) injection 5,000 Units, 5,000 Units, subcutaneous, q12h, Alton Lambert MD    insulin glargine (Lantus) injection 38 Units, 38 Units, subcutaneous, q24h, Alton Lambert MD, 38 Units at 01/24/25 2109    insulin lispro injection 0-10 Units, 0-10 Units, subcutaneous, TID AC, Alton Lambert MD, 2 Units at 01/24/25 1725    melatonin tablet 3 mg, 3 mg, oral, Nightly PRN, Alton Lambert MD    metoprolol succinate XL (Toprol-XL) 24 hr tablet 50 mg, 50 mg, oral, BID, Enma Rivera, APRN-CNP, 50 mg at 01/24/25 2109    ondansetron (Zofran) tablet 4 mg, 4 mg, oral, q8h PRN **OR** ondansetron (Zofran) injection 4 mg, 4 mg, intravenous, q8h PRN, Alton Lambert MD    polyethylene glycol (Glycolax, Miralax) packet 17 g, 17 g, oral, Daily, Alton Lambert MD, 17 g at 01/24/25 1010    sennosides-docusate sodium (Linda-Colace) 8.6-50 mg per tablet 1 tablet, 1 tablet, oral, Nightly PRN, Alton Lambert MD    tamsulosin (Flomax) 24 hr capsule 0.4 mg, 0.4 mg,  oral, Daily, Alton Lambert MD, 0.4 mg at 01/24/25 1010    traMADol (Ultram) tablet 50 mg, 50 mg, oral, q8h PRN, Alton Lambert MD   Relevant Results    Results for orders placed or performed during the hospital encounter of 01/22/25 (from the past 96 hours)   TSH with reflex to Free T4 if abnormal   Result Value Ref Range    Thyroid Stimulating Hormone 2.01 0.44 - 3.98 mIU/L   Creatine Kinase   Result Value Ref Range    Creatine Kinase 570 (H) 0 - 325 U/L   POCT GLUCOSE   Result Value Ref Range    POCT Glucose 180 (H) 74 - 99 mg/dL   Hemoglobin A1C   Result Value Ref Range    Hemoglobin A1C 8.1 (H) See comment %    Estimated Average Glucose 186 Not Established mg/dL   CBC   Result Value Ref Range    WBC 11.4 (H) 4.4 - 11.3 x10*3/uL    nRBC 0.0 0.0 - 0.0 /100 WBCs    RBC 4.73 4.50 - 5.90 x10*6/uL    Hemoglobin 14.0 13.5 - 17.5 g/dL    Hematocrit 41.6 41.0 - 52.0 %    MCV 88 80 - 100 fL    MCH 29.6 26.0 - 34.0 pg    MCHC 33.7 32.0 - 36.0 g/dL    RDW 14.3 11.5 - 14.5 %    Platelets 166 150 - 450 x10*3/uL   Comprehensive metabolic panel   Result Value Ref Range    Glucose 172 (H) 74 - 99 mg/dL    Sodium 134 (L) 136 - 145 mmol/L    Potassium 4.4 3.5 - 5.3 mmol/L    Chloride 107 98 - 107 mmol/L    Bicarbonate 22 21 - 32 mmol/L    Anion Gap 9 (L) 10 - 20 mmol/L    Urea Nitrogen 27 (H) 6 - 23 mg/dL    Creatinine 1.93 (H) 0.50 - 1.30 mg/dL    eGFR 37 (L) >60 mL/min/1.73m*2    Calcium 7.8 (L) 8.6 - 10.3 mg/dL    Albumin 3.3 (L) 3.4 - 5.0 g/dL    Alkaline Phosphatase 61 33 - 136 U/L    Total Protein 7.1 6.4 - 8.2 g/dL    AST 19 9 - 39 U/L    Bilirubin, Total 0.5 0.0 - 1.2 mg/dL    ALT 12 10 - 52 U/L   POCT GLUCOSE   Result Value Ref Range    POCT Glucose 212 (H) 74 - 99 mg/dL   POCT GLUCOSE   Result Value Ref Range    POCT Glucose 152 (H) 74 - 99 mg/dL   POCT GLUCOSE   Result Value Ref Range    POCT Glucose 155 (H) 74 - 99 mg/dL   POCT GLUCOSE   Result Value Ref Range    POCT Glucose 169 (H) 74 - 99 mg/dL   POCT GLUCOSE    Result Value Ref Range    POCT Glucose 160 (H) 74 - 99 mg/dL   POCT GLUCOSE   Result Value Ref Range    POCT Glucose 137 (H) 74 - 99 mg/dL   POCT GLUCOSE   Result Value Ref Range    POCT Glucose 126 (H) 74 - 99 mg/dL   CBC and Auto Differential   Result Value Ref Range    WBC 9.5 4.4 - 11.3 x10*3/uL    nRBC 0.0 0.0 - 0.0 /100 WBCs    RBC 4.95 4.50 - 5.90 x10*6/uL    Hemoglobin 14.4 13.5 - 17.5 g/dL    Hematocrit 42.3 41.0 - 52.0 %    MCV 86 80 - 100 fL    MCH 29.1 26.0 - 34.0 pg    MCHC 34.0 32.0 - 36.0 g/dL    RDW 13.6 11.5 - 14.5 %    Platelets 175 150 - 450 x10*3/uL    Neutrophils % 69.4 40.0 - 80.0 %    Immature Granulocytes %, Automated 0.3 0.0 - 0.9 %    Lymphocytes % 17.4 13.0 - 44.0 %    Monocytes % 9.3 2.0 - 10.0 %    Eosinophils % 3.2 0.0 - 6.0 %    Basophils % 0.4 0.0 - 2.0 %    Neutrophils Absolute 6.61 (H) 1.60 - 5.50 x10*3/uL    Immature Granulocytes Absolute, Automated 0.03 0.00 - 0.50 x10*3/uL    Lymphocytes Absolute 1.65 0.80 - 3.00 x10*3/uL    Monocytes Absolute 0.88 (H) 0.05 - 0.80 x10*3/uL    Eosinophils Absolute 0.30 0.00 - 0.40 x10*3/uL    Basophils Absolute 0.04 0.00 - 0.10 x10*3/uL   Basic metabolic panel   Result Value Ref Range    Glucose 136 (H) 74 - 99 mg/dL    Sodium 138 136 - 145 mmol/L    Potassium 4.1 3.5 - 5.3 mmol/L    Chloride 106 98 - 107 mmol/L    Bicarbonate 27 21 - 32 mmol/L    Anion Gap 9 (L) 10 - 20 mmol/L    Urea Nitrogen 25 (H) 6 - 23 mg/dL    Creatinine 1.85 (H) 0.50 - 1.30 mg/dL    eGFR 38 (L) >60 mL/min/1.73m*2    Calcium 8.2 (L) 8.6 - 10.3 mg/dL   POCT GLUCOSE   Result Value Ref Range    POCT Glucose 194 (H) 74 - 99 mg/dL   POCT GLUCOSE   Result Value Ref Range    POCT Glucose 250 (H) 74 - 99 mg/dL   Renal function panel   Result Value Ref Range    Glucose 138 (H) 74 - 99 mg/dL    Sodium 137 136 - 145 mmol/L    Potassium 4.2 3.5 - 5.3 mmol/L    Chloride 109 (H) 98 - 107 mmol/L    Bicarbonate 24 21 - 32 mmol/L    Anion Gap 8 (L) 10 - 20 mmol/L    Urea Nitrogen 26 (H) 6  - 23 mg/dL    Creatinine 1.76 (H) 0.50 - 1.30 mg/dL    eGFR 41 (L) >60 mL/min/1.73m*2    Calcium 8.2 (L) 8.6 - 10.3 mg/dL    Phosphorus 2.6 2.5 - 4.9 mg/dL    Albumin 3.2 (L) 3.4 - 5.0 g/dL   CBC   Result Value Ref Range    WBC 8.2 4.4 - 11.3 x10*3/uL    nRBC 0.0 0.0 - 0.0 /100 WBCs    RBC 4.96 4.50 - 5.90 x10*6/uL    Hemoglobin 14.4 13.5 - 17.5 g/dL    Hematocrit 42.7 41.0 - 52.0 %    MCV 86 80 - 100 fL    MCH 29.0 26.0 - 34.0 pg    MCHC 33.7 32.0 - 36.0 g/dL    RDW 13.6 11.5 - 14.5 %    Platelets 170 150 - 450 x10*3/uL       Assessment/Plan   Chronic kidney disease stage IIIb renal function continue to monitor his GFR is better than his baseline  Second-degree AV block status post pacemaker placement  Diabetes mellitus type II  Acute versus subacute cerebral infarct seen by neurology he is on aspirin and Plavix      Garrett Garcia MD

## 2025-01-25 NOTE — NURSING NOTE
1930: Assumed care of patient. Patient is laying in bed. A/O 2-3. All falls precautions are in place. Call light is in reach.

## 2025-01-26 LAB
ALBUMIN SERPL BCP-MCNC: 3.1 G/DL (ref 3.4–5)
ANION GAP SERPL CALCULATED.3IONS-SCNC: 8 MMOL/L (ref 10–20)
BUN SERPL-MCNC: 26 MG/DL (ref 6–23)
CALCIUM SERPL-MCNC: 8.3 MG/DL (ref 8.6–10.3)
CHLORIDE SERPL-SCNC: 105 MMOL/L (ref 98–107)
CO2 SERPL-SCNC: 27 MMOL/L (ref 21–32)
CREAT SERPL-MCNC: 1.89 MG/DL (ref 0.5–1.3)
EGFRCR SERPLBLD CKD-EPI 2021: 37 ML/MIN/1.73M*2
ERYTHROCYTE [DISTWIDTH] IN BLOOD BY AUTOMATED COUNT: 13.5 % (ref 11.5–14.5)
GLUCOSE BLD MANUAL STRIP-MCNC: 158 MG/DL (ref 74–99)
GLUCOSE BLD MANUAL STRIP-MCNC: 188 MG/DL (ref 74–99)
GLUCOSE BLD MANUAL STRIP-MCNC: 216 MG/DL (ref 74–99)
GLUCOSE SERPL-MCNC: 59 MG/DL (ref 74–99)
HCT VFR BLD AUTO: 40.4 % (ref 41–52)
HGB BLD-MCNC: 13.6 G/DL (ref 13.5–17.5)
MCH RBC QN AUTO: 29.1 PG (ref 26–34)
MCHC RBC AUTO-ENTMCNC: 33.7 G/DL (ref 32–36)
MCV RBC AUTO: 87 FL (ref 80–100)
NRBC BLD-RTO: 0 /100 WBCS (ref 0–0)
PHOSPHATE SERPL-MCNC: 2.9 MG/DL (ref 2.5–4.9)
PLATELET # BLD AUTO: 171 X10*3/UL (ref 150–450)
POTASSIUM SERPL-SCNC: 4.2 MMOL/L (ref 3.5–5.3)
RBC # BLD AUTO: 4.67 X10*6/UL (ref 4.5–5.9)
SODIUM SERPL-SCNC: 136 MMOL/L (ref 136–145)
WBC # BLD AUTO: 7.5 X10*3/UL (ref 4.4–11.3)

## 2025-01-26 PROCEDURE — 99232 SBSQ HOSP IP/OBS MODERATE 35: CPT | Performed by: INTERNAL MEDICINE

## 2025-01-26 PROCEDURE — 2500000001 HC RX 250 WO HCPCS SELF ADMINISTERED DRUGS (ALT 637 FOR MEDICARE OP): Performed by: INTERNAL MEDICINE

## 2025-01-26 PROCEDURE — 2500000004 HC RX 250 GENERAL PHARMACY W/ HCPCS (ALT 636 FOR OP/ED): Performed by: INTERNAL MEDICINE

## 2025-01-26 PROCEDURE — 82947 ASSAY GLUCOSE BLOOD QUANT: CPT

## 2025-01-26 PROCEDURE — 80069 RENAL FUNCTION PANEL: CPT | Performed by: INTERNAL MEDICINE

## 2025-01-26 PROCEDURE — 2500000002 HC RX 250 W HCPCS SELF ADMINISTERED DRUGS (ALT 637 FOR MEDICARE OP, ALT 636 FOR OP/ED): Performed by: INTERNAL MEDICINE

## 2025-01-26 PROCEDURE — 85027 COMPLETE CBC AUTOMATED: CPT | Performed by: INTERNAL MEDICINE

## 2025-01-26 PROCEDURE — 2500000001 HC RX 250 WO HCPCS SELF ADMINISTERED DRUGS (ALT 637 FOR MEDICARE OP): Performed by: REGISTERED NURSE

## 2025-01-26 PROCEDURE — 2060000001 HC INTERMEDIATE ICU ROOM DAILY

## 2025-01-26 PROCEDURE — 36415 COLL VENOUS BLD VENIPUNCTURE: CPT | Performed by: INTERNAL MEDICINE

## 2025-01-26 RX ADMIN — FAMOTIDINE 20 MG: 20 TABLET, FILM COATED ORAL at 08:36

## 2025-01-26 RX ADMIN — FINASTERIDE 5 MG: 5 TABLET, FILM COATED ORAL at 08:36

## 2025-01-26 RX ADMIN — METOPROLOL SUCCINATE 50 MG: 50 TABLET, EXTENDED RELEASE ORAL at 20:21

## 2025-01-26 RX ADMIN — ERGOCALCIFEROL 1250 MCG: 1.25 CAPSULE ORAL at 08:36

## 2025-01-26 RX ADMIN — TAMSULOSIN HYDROCHLORIDE 0.4 MG: 0.4 CAPSULE ORAL at 08:36

## 2025-01-26 RX ADMIN — INSULIN GLARGINE 38 UNITS: 100 INJECTION, SOLUTION SUBCUTANEOUS at 20:21

## 2025-01-26 RX ADMIN — AMLODIPINE BESYLATE 2.5 MG: 2.5 TABLET ORAL at 08:36

## 2025-01-26 RX ADMIN — POLYETHYLENE GLYCOL 3350 17 G: 17 POWDER, FOR SOLUTION ORAL at 08:36

## 2025-01-26 RX ADMIN — CLOPIDOGREL BISULFATE 75 MG: 75 TABLET ORAL at 08:36

## 2025-01-26 RX ADMIN — INSULIN LISPRO 2 UNITS: 100 INJECTION, SOLUTION INTRAVENOUS; SUBCUTANEOUS at 17:57

## 2025-01-26 RX ADMIN — INSULIN LISPRO 2 UNITS: 100 INJECTION, SOLUTION INTRAVENOUS; SUBCUTANEOUS at 12:16

## 2025-01-26 RX ADMIN — METOPROLOL SUCCINATE 50 MG: 50 TABLET, EXTENDED RELEASE ORAL at 08:36

## 2025-01-26 RX ADMIN — ASPIRIN 81 MG: 81 TABLET, CHEWABLE ORAL at 08:36

## 2025-01-26 ASSESSMENT — PAIN SCALES - GENERAL
PAINLEVEL_OUTOF10: 0 - NO PAIN
PAINLEVEL_OUTOF10: 0 - NO PAIN

## 2025-01-26 ASSESSMENT — COGNITIVE AND FUNCTIONAL STATUS - GENERAL
MOVING FROM LYING ON BACK TO SITTING ON SIDE OF FLAT BED WITH BEDRAILS: TOTAL
DRESSING REGULAR LOWER BODY CLOTHING: TOTAL
WALKING IN HOSPITAL ROOM: TOTAL
DAILY ACTIVITIY SCORE: 8
MOVING TO AND FROM BED TO CHAIR: TOTAL
STANDING UP FROM CHAIR USING ARMS: TOTAL
PERSONAL GROOMING: TOTAL
TOILETING: TOTAL
DRESSING REGULAR UPPER BODY CLOTHING: TOTAL
HELP NEEDED FOR BATHING: TOTAL
TURNING FROM BACK TO SIDE WHILE IN FLAT BAD: TOTAL
EATING MEALS: A LITTLE

## 2025-01-26 NOTE — CARE PLAN
Problem: Safety - Adult  Goal: Free from fall injury  Outcome: Progressing     Problem: Chronic Conditions and Co-morbidities  Goal: Patient's chronic conditions and co-morbidity symptoms are monitored and maintained or improved  Outcome: Progressing     Problem: Pain - Adult  Goal: Verbalizes/displays adequate comfort level or baseline comfort level  Outcome: Progressing   The patient's goals for the shift include  rest    The clinical goals for the shift include Safety    Over the shift, the patient did  make progress toward the following goals.

## 2025-01-26 NOTE — PROGRESS NOTES
"Tony King is a 69 y.o. male on day 4 of admission presenting with Bradycardia.    Subjective   Patient is seen for chronically stage IIIb admitted with secondary heart block had pacemaker placement also had stroke stable he is diuresing well in negative fluid balance no shortness of breath or chest pain       Objective     Physical Exam  Neck:      Vascular: No carotid bruit.   Cardiovascular:      Rate and Rhythm: Normal rate and regular rhythm.      Heart sounds: No murmur heard.     No friction rub. No gallop.   Pulmonary:      Breath sounds: No wheezing, rhonchi or rales.   Chest:      Chest wall: No tenderness.   Abdominal:      General: There is no distension.      Tenderness: There is no abdominal tenderness. There is no guarding or rebound.   Musculoskeletal:         General: No swelling or tenderness.      Cervical back: Neck supple.      Right lower leg: No edema.      Left lower leg: No edema.   Lymphadenopathy:      Cervical: No cervical adenopathy.         Last Recorded Vitals  Blood pressure 122/80, pulse 61, temperature 36.1 °C (97 °F), temperature source Temporal, resp. rate 17, height 1.956 m (6' 5\"), weight 110 kg (243 lb 9.7 oz), SpO2 100%.    Intake/Output last 3 Shifts:  I/O last 3 completed shifts:  In: 480 (4.3 mL/kg) [P.O.:480]  Out: 2850 (25.8 mL/kg) [Urine:2850 (0.7 mL/kg/hr)]  Weight: 110.5 kg     Current Facility-Administered Medications:     acetaminophen (Tylenol) tablet 650 mg, 650 mg, oral, q4h PRN **OR** acetaminophen (Tylenol) oral liquid 650 mg, 650 mg, nasogastric tube, q4h PRN **OR** acetaminophen (Tylenol) suppository 650 mg, 650 mg, rectal, q4h PRN, Alton Lambert MD    acetaminophen (Tylenol) tablet 650 mg, 650 mg, oral, q4h PRN **OR** acetaminophen (Tylenol) oral liquid 650 mg, 650 mg, oral, q4h PRN **OR** acetaminophen (Tylenol) suppository 650 mg, 650 mg, rectal, q4h PRN, Alton Lambert MD    amLODIPine (Norvasc) tablet 2.5 mg, 2.5 mg, oral, Daily, Alton Lambert, " MD, 2.5 mg at 01/26/25 0836    aspirin chewable tablet 81 mg, 81 mg, oral, Daily, Alton Lambert MD, 81 mg at 01/26/25 0836    clopidogrel (Plavix) tablet 75 mg, 75 mg, oral, Daily, Alton Lambert MD, 75 mg at 01/26/25 0836    dextrose 50 % injection 12.5 g, 12.5 g, intravenous, q15 min PRN, Alton Lambert MD    dextrose 50 % injection 25 g, 25 g, intravenous, q15 min PRN, Alton Lambert MD    ergocalciferol (Vitamin D-2) capsule 1,250 mcg, 1,250 mcg, oral, Every Sunday, Alton Lambert MD, 1,250 mcg at 01/26/25 0836    famotidine (Pepcid) tablet 20 mg, 20 mg, oral, Daily, 20 mg at 01/26/25 0836 **OR** famotidine PF (Pepcid) injection 20 mg, 20 mg, intravenous, Daily, Alton Lambert MD    finasteride (Proscar) tablet 5 mg, 5 mg, oral, Daily, Alton Lambert MD, 5 mg at 01/26/25 0836    glucagon (Glucagen) injection 1 mg, 1 mg, intramuscular, q15 min PRN, Alton Lambert MD    glucagon (Glucagen) injection 1 mg, 1 mg, intramuscular, q15 min PRN, Alton Lambert MD    [Held by provider] heparin (porcine) injection 5,000 Units, 5,000 Units, subcutaneous, q12h, Alton Lambert MD    insulin glargine (Lantus) injection 38 Units, 38 Units, subcutaneous, q24h, Alton Lambert MD, 38 Units at 01/25/25 2018    insulin lispro injection 0-10 Units, 0-10 Units, subcutaneous, TID AC, Alton Lambert MD, 2 Units at 01/25/25 1237    melatonin tablet 3 mg, 3 mg, oral, Nightly PRN, Alton Lambert MD    metoprolol succinate XL (Toprol-XL) 24 hr tablet 50 mg, 50 mg, oral, BID, Enma Rivera, APRN-CNP, 50 mg at 01/26/25 0836    ondansetron (Zofran) tablet 4 mg, 4 mg, oral, q8h PRN **OR** ondansetron (Zofran) injection 4 mg, 4 mg, intravenous, q8h PRN, Alton Lambert MD    polyethylene glycol (Glycolax, Miralax) packet 17 g, 17 g, oral, Daily, Alton Lambert MD, 17 g at 01/26/25 0836    sennosides-docusate sodium (Linda-Colace) 8.6-50 mg per tablet 1 tablet, 1 tablet, oral, Nightly PRN, Alton  MD Petra    tamsulosin (Flomax) 24 hr capsule 0.4 mg, 0.4 mg, oral, Daily, Alton Lambert MD, 0.4 mg at 01/26/25 0836    traMADol (Ultram) tablet 50 mg, 50 mg, oral, q8h PRN, Alton Lambert MD   Relevant Results    Results for orders placed or performed during the hospital encounter of 01/22/25 (from the past 96 hours)   POCT GLUCOSE   Result Value Ref Range    POCT Glucose 180 (H) 74 - 99 mg/dL   Hemoglobin A1C   Result Value Ref Range    Hemoglobin A1C 8.1 (H) See comment %    Estimated Average Glucose 186 Not Established mg/dL   CBC   Result Value Ref Range    WBC 11.4 (H) 4.4 - 11.3 x10*3/uL    nRBC 0.0 0.0 - 0.0 /100 WBCs    RBC 4.73 4.50 - 5.90 x10*6/uL    Hemoglobin 14.0 13.5 - 17.5 g/dL    Hematocrit 41.6 41.0 - 52.0 %    MCV 88 80 - 100 fL    MCH 29.6 26.0 - 34.0 pg    MCHC 33.7 32.0 - 36.0 g/dL    RDW 14.3 11.5 - 14.5 %    Platelets 166 150 - 450 x10*3/uL   Comprehensive metabolic panel   Result Value Ref Range    Glucose 172 (H) 74 - 99 mg/dL    Sodium 134 (L) 136 - 145 mmol/L    Potassium 4.4 3.5 - 5.3 mmol/L    Chloride 107 98 - 107 mmol/L    Bicarbonate 22 21 - 32 mmol/L    Anion Gap 9 (L) 10 - 20 mmol/L    Urea Nitrogen 27 (H) 6 - 23 mg/dL    Creatinine 1.93 (H) 0.50 - 1.30 mg/dL    eGFR 37 (L) >60 mL/min/1.73m*2    Calcium 7.8 (L) 8.6 - 10.3 mg/dL    Albumin 3.3 (L) 3.4 - 5.0 g/dL    Alkaline Phosphatase 61 33 - 136 U/L    Total Protein 7.1 6.4 - 8.2 g/dL    AST 19 9 - 39 U/L    Bilirubin, Total 0.5 0.0 - 1.2 mg/dL    ALT 12 10 - 52 U/L   POCT GLUCOSE   Result Value Ref Range    POCT Glucose 212 (H) 74 - 99 mg/dL   POCT GLUCOSE   Result Value Ref Range    POCT Glucose 152 (H) 74 - 99 mg/dL   POCT GLUCOSE   Result Value Ref Range    POCT Glucose 155 (H) 74 - 99 mg/dL   POCT GLUCOSE   Result Value Ref Range    POCT Glucose 169 (H) 74 - 99 mg/dL   POCT GLUCOSE   Result Value Ref Range    POCT Glucose 160 (H) 74 - 99 mg/dL   POCT GLUCOSE   Result Value Ref Range    POCT Glucose 137 (H) 74 - 99  mg/dL   POCT GLUCOSE   Result Value Ref Range    POCT Glucose 126 (H) 74 - 99 mg/dL   CBC and Auto Differential   Result Value Ref Range    WBC 9.5 4.4 - 11.3 x10*3/uL    nRBC 0.0 0.0 - 0.0 /100 WBCs    RBC 4.95 4.50 - 5.90 x10*6/uL    Hemoglobin 14.4 13.5 - 17.5 g/dL    Hematocrit 42.3 41.0 - 52.0 %    MCV 86 80 - 100 fL    MCH 29.1 26.0 - 34.0 pg    MCHC 34.0 32.0 - 36.0 g/dL    RDW 13.6 11.5 - 14.5 %    Platelets 175 150 - 450 x10*3/uL    Neutrophils % 69.4 40.0 - 80.0 %    Immature Granulocytes %, Automated 0.3 0.0 - 0.9 %    Lymphocytes % 17.4 13.0 - 44.0 %    Monocytes % 9.3 2.0 - 10.0 %    Eosinophils % 3.2 0.0 - 6.0 %    Basophils % 0.4 0.0 - 2.0 %    Neutrophils Absolute 6.61 (H) 1.60 - 5.50 x10*3/uL    Immature Granulocytes Absolute, Automated 0.03 0.00 - 0.50 x10*3/uL    Lymphocytes Absolute 1.65 0.80 - 3.00 x10*3/uL    Monocytes Absolute 0.88 (H) 0.05 - 0.80 x10*3/uL    Eosinophils Absolute 0.30 0.00 - 0.40 x10*3/uL    Basophils Absolute 0.04 0.00 - 0.10 x10*3/uL   Basic metabolic panel   Result Value Ref Range    Glucose 136 (H) 74 - 99 mg/dL    Sodium 138 136 - 145 mmol/L    Potassium 4.1 3.5 - 5.3 mmol/L    Chloride 106 98 - 107 mmol/L    Bicarbonate 27 21 - 32 mmol/L    Anion Gap 9 (L) 10 - 20 mmol/L    Urea Nitrogen 25 (H) 6 - 23 mg/dL    Creatinine 1.85 (H) 0.50 - 1.30 mg/dL    eGFR 38 (L) >60 mL/min/1.73m*2    Calcium 8.2 (L) 8.6 - 10.3 mg/dL   POCT GLUCOSE   Result Value Ref Range    POCT Glucose 194 (H) 74 - 99 mg/dL   POCT GLUCOSE   Result Value Ref Range    POCT Glucose 250 (H) 74 - 99 mg/dL   Renal function panel   Result Value Ref Range    Glucose 138 (H) 74 - 99 mg/dL    Sodium 137 136 - 145 mmol/L    Potassium 4.2 3.5 - 5.3 mmol/L    Chloride 109 (H) 98 - 107 mmol/L    Bicarbonate 24 21 - 32 mmol/L    Anion Gap 8 (L) 10 - 20 mmol/L    Urea Nitrogen 26 (H) 6 - 23 mg/dL    Creatinine 1.76 (H) 0.50 - 1.30 mg/dL    eGFR 41 (L) >60 mL/min/1.73m*2    Calcium 8.2 (L) 8.6 - 10.3 mg/dL     Phosphorus 2.6 2.5 - 4.9 mg/dL    Albumin 3.2 (L) 3.4 - 5.0 g/dL   CBC   Result Value Ref Range    WBC 8.2 4.4 - 11.3 x10*3/uL    nRBC 0.0 0.0 - 0.0 /100 WBCs    RBC 4.96 4.50 - 5.90 x10*6/uL    Hemoglobin 14.4 13.5 - 17.5 g/dL    Hematocrit 42.7 41.0 - 52.0 %    MCV 86 80 - 100 fL    MCH 29.0 26.0 - 34.0 pg    MCHC 33.7 32.0 - 36.0 g/dL    RDW 13.6 11.5 - 14.5 %    Platelets 170 150 - 450 x10*3/uL   POCT GLUCOSE   Result Value Ref Range    POCT Glucose 183 (H) 74 - 99 mg/dL   POCT GLUCOSE   Result Value Ref Range    POCT Glucose 78 74 - 99 mg/dL   POCT GLUCOSE   Result Value Ref Range    POCT Glucose 196 (H) 74 - 99 mg/dL   POCT GLUCOSE   Result Value Ref Range    POCT Glucose 126 (H) 74 - 99 mg/dL   Renal function panel   Result Value Ref Range    Glucose 59 (L) 74 - 99 mg/dL    Sodium 136 136 - 145 mmol/L    Potassium 4.2 3.5 - 5.3 mmol/L    Chloride 105 98 - 107 mmol/L    Bicarbonate 27 21 - 32 mmol/L    Anion Gap 8 (L) 10 - 20 mmol/L    Urea Nitrogen 26 (H) 6 - 23 mg/dL    Creatinine 1.89 (H) 0.50 - 1.30 mg/dL    eGFR 37 (L) >60 mL/min/1.73m*2    Calcium 8.3 (L) 8.6 - 10.3 mg/dL    Phosphorus 2.9 2.5 - 4.9 mg/dL    Albumin 3.1 (L) 3.4 - 5.0 g/dL   CBC   Result Value Ref Range    WBC 7.5 4.4 - 11.3 x10*3/uL    nRBC 0.0 0.0 - 0.0 /100 WBCs    RBC 4.67 4.50 - 5.90 x10*6/uL    Hemoglobin 13.6 13.5 - 17.5 g/dL    Hematocrit 40.4 (L) 41.0 - 52.0 %    MCV 87 80 - 100 fL    MCH 29.1 26.0 - 34.0 pg    MCHC 33.7 32.0 - 36.0 g/dL    RDW 13.5 11.5 - 14.5 %    Platelets 171 150 - 450 x10*3/uL   POCT GLUCOSE   Result Value Ref Range    POCT Glucose 188 (H) 74 - 99 mg/dL       Assessment/Plan   Chronic kidney disease stage IIIb function stable continue current medications and renal diet and continue to monitor awaiting placement in rehab  Second-degree AV block status post pacemaker placement  Diabetes mellitus type II  Acute versus subacute cerebral infarct seen by neurology he is on aspirin and Plavix      Garrett Garcia,  MD

## 2025-01-26 NOTE — PROGRESS NOTES
01/26/25 0910   Discharge Planning   Expected Discharge Disposition Rehab   Does the patient need discharge transport arranged? Yes   RoundTrip coordination needed? Yes     Precert remains pending for [facility] IRF.   Updates attached to referral and sent to facility for review       DC Plan NOT secure  Do NOT DC without speaking to care coordination, PRECERT PENDING FOR IRF

## 2025-01-26 NOTE — PROGRESS NOTES
Tony King is a 69 y.o. male on day 4 of admission presenting with Bradycardia.      Subjective     Patient had pacemaker implant 1/24/2025  He denied chest pain or shortness of breath.  Patient has been afebrile during the night.    Objective     Last Recorded Vitals  /57 (BP Location: Right arm, Patient Position: Lying)   Pulse 61   Temp 36 °C (96.8 °F) (Temporal)   Resp 18   Wt 110 kg (243 lb 9.7 oz)   SpO2 98%   Intake/Output last 3 Shifts:    Intake/Output Summary (Last 24 hours) at 1/26/2025 0719  Last data filed at 1/26/2025 0512  Gross per 24 hour   Intake 480 ml   Output 1750 ml   Net -1270 ml       Admission Weight  Weight: 123 kg (271 lb 2.7 oz) (01/22/25 1658)    Daily Weight  01/26/25 : 110 kg (243 lb 9.7 oz)    Image Results  XR chest 2 views  Narrative: Interpreted By:  Maria Guadalupe Barragan,   STUDY:  XR CHEST 2 VIEWS 1/24/2025 9:20 am      INDICATION:  Signs/Symptoms:post pacemaker insertion      COMPARISON:  01/18/2025      ACCESSION NUMBER(S):  [id number]      ORDERING CLINICIAN:  PUJA CARDENAS      TECHNIQUE:  AP erect and lateral views of the chest were acquired.      FINDINGS:  Utilizing a left subclavian approach, bipolar pacemaker leads have  been inserted and terminate in the right atrium and right ventricle.  There is no pneumothorax.      The cardiac size is normal. There are several areas of atelectasis in  each lower lung zone.      Impression: No pneumothorax following bipolar pacemaker insertion with leads  located in the right side of the heart.      Several areas of atelectasis in each lower lung field.      Signed by: Maria Guadalupe Barragan 1/24/2025 12:02 PM  Dictation workstation:   [id number]      Physical Exam    General: cooperating during physical exam.  HEENT: Pupils are equal and reactive to light and commendation , oral mucosa moist, no JVD.  Cardiovascular: PMI nondisplaced, S1-S2 normal, pacemaker in place on left side of chest  Lungs: Clear to auscultation bilaterally, no  wheezing, no crackles, no dullness to percussion.  Abdomen: No hepatosplenomegaly appreciated, soft , not tender, positive bowel sounds, positive bowel movement.  Neuro: Alert and oriented x2, strength on right upper extremity and right lower extremity 4 out of 5   Musculoskeletal: Right great toe amputated,  Vascular: Pulses are intact in upper and lower extremities  Skin: Changes in lower extremities from venous stasis    Assessment/Plan        Second-degree AV block   Patient was transferred from [facility] to [facility] to be evaluated by electrophysiology.  Evaluated by Dr. Correa from electrophysiology services to evaluate him today  Monitor patient has intermittently secondary AV block  Status post pacemaker implant     Right sided weakness  Acute CVA  CT brain no acute intracranial finding  MRI brain revealed acute versus subacute infarct located in the left cerebral hemisphere in the territory PAULIE.  Patient was started on aspirin and Plavix.    Tele stroke neurology recommended to continue with aspirin and Plavix for 90 days and aspirin after.  Patient was evaluated by Dr. Nelson from neurology services.  Dr. Nelson on the case    History of  myositis  Rhabdomyolysis  CK trend down  Hold statins     Type 1 diabetes mellitus  Fairly controlled, hemoglobin A1c 8.1  Increase Lantus to 38 units q. bedtime, cover with insulin sliding scale  Monitor close diabetic diet   Accu-Chek before meals and at bedtime.     Morbid obesity  BMI 32.13   advised regarding diet.     Acute kidney injury  Creatinine improved  Most likely secondary to IV contrast  Avoid nephrotoxic drugs  Patient was challenged with fluids  Waiting for Dr. Garcia to see him today    DVT prophylaxis     Deconditioning  PT /OT evaluated him and recommending high intensity level of care  Fall precaution  Ambulate with assistance     Multinodular thyroid  I will refer the patient to Dr. Loredo endocrinologist as outpatient  Patient understood  importance of following up with endocrinology     Discussed with the nurse at bedside  Plan to discharge to acute rehab, awaiting for pre-CERT.      Alton Lambert MD

## 2025-01-27 VITALS
HEART RATE: 64 BPM | WEIGHT: 243.61 LBS | DIASTOLIC BLOOD PRESSURE: 56 MMHG | HEIGHT: 77 IN | OXYGEN SATURATION: 96 % | SYSTOLIC BLOOD PRESSURE: 134 MMHG | RESPIRATION RATE: 17 BRPM | BODY MASS INDEX: 28.76 KG/M2 | TEMPERATURE: 97.3 F

## 2025-01-27 LAB
ALBUMIN SERPL BCP-MCNC: 3.1 G/DL (ref 3.4–5)
ANION GAP SERPL CALCULATED.3IONS-SCNC: 10 MMOL/L (ref 10–20)
BUN SERPL-MCNC: 33 MG/DL (ref 6–23)
CALCIUM SERPL-MCNC: 7.6 MG/DL (ref 8.6–10.3)
CHLORIDE SERPL-SCNC: 104 MMOL/L (ref 98–107)
CO2 SERPL-SCNC: 23 MMOL/L (ref 21–32)
CREAT SERPL-MCNC: 2.19 MG/DL (ref 0.5–1.3)
EGFRCR SERPLBLD CKD-EPI 2021: 31 ML/MIN/1.73M*2
ERYTHROCYTE [DISTWIDTH] IN BLOOD BY AUTOMATED COUNT: 13.3 % (ref 11.5–14.5)
GLUCOSE BLD MANUAL STRIP-MCNC: 125 MG/DL (ref 74–99)
GLUCOSE BLD MANUAL STRIP-MCNC: 135 MG/DL (ref 74–99)
GLUCOSE BLD MANUAL STRIP-MCNC: 160 MG/DL (ref 74–99)
GLUCOSE BLD MANUAL STRIP-MCNC: 210 MG/DL (ref 74–99)
GLUCOSE BLD MANUAL STRIP-MCNC: 234 MG/DL (ref 74–99)
GLUCOSE SERPL-MCNC: 158 MG/DL (ref 74–99)
HCT VFR BLD AUTO: 42.5 % (ref 41–52)
HGB BLD-MCNC: 14.5 G/DL (ref 13.5–17.5)
MCH RBC QN AUTO: 29.2 PG (ref 26–34)
MCHC RBC AUTO-ENTMCNC: 34.1 G/DL (ref 32–36)
MCV RBC AUTO: 86 FL (ref 80–100)
NRBC BLD-RTO: 0 /100 WBCS (ref 0–0)
PHOSPHATE SERPL-MCNC: 3 MG/DL (ref 2.5–4.9)
PLATELET # BLD AUTO: 187 X10*3/UL (ref 150–450)
POTASSIUM SERPL-SCNC: 4.4 MMOL/L (ref 3.5–5.3)
RBC # BLD AUTO: 4.96 X10*6/UL (ref 4.5–5.9)
SODIUM SERPL-SCNC: 133 MMOL/L (ref 136–145)
WBC # BLD AUTO: 6.5 X10*3/UL (ref 4.4–11.3)

## 2025-01-27 PROCEDURE — 82947 ASSAY GLUCOSE BLOOD QUANT: CPT

## 2025-01-27 PROCEDURE — 85027 COMPLETE CBC AUTOMATED: CPT | Performed by: INTERNAL MEDICINE

## 2025-01-27 PROCEDURE — 99024 POSTOP FOLLOW-UP VISIT: CPT | Performed by: REGISTERED NURSE

## 2025-01-27 PROCEDURE — 97530 THERAPEUTIC ACTIVITIES: CPT | Mod: GP

## 2025-01-27 PROCEDURE — 2500000001 HC RX 250 WO HCPCS SELF ADMINISTERED DRUGS (ALT 637 FOR MEDICARE OP): Performed by: INTERNAL MEDICINE

## 2025-01-27 PROCEDURE — 36415 COLL VENOUS BLD VENIPUNCTURE: CPT | Performed by: INTERNAL MEDICINE

## 2025-01-27 PROCEDURE — 99232 SBSQ HOSP IP/OBS MODERATE 35: CPT | Performed by: INTERNAL MEDICINE

## 2025-01-27 PROCEDURE — 2060000001 HC INTERMEDIATE ICU ROOM DAILY

## 2025-01-27 PROCEDURE — 2500000004 HC RX 250 GENERAL PHARMACY W/ HCPCS (ALT 636 FOR OP/ED): Performed by: INTERNAL MEDICINE

## 2025-01-27 PROCEDURE — 2500000002 HC RX 250 W HCPCS SELF ADMINISTERED DRUGS (ALT 637 FOR MEDICARE OP, ALT 636 FOR OP/ED): Performed by: INTERNAL MEDICINE

## 2025-01-27 PROCEDURE — 2500000001 HC RX 250 WO HCPCS SELF ADMINISTERED DRUGS (ALT 637 FOR MEDICARE OP): Performed by: REGISTERED NURSE

## 2025-01-27 PROCEDURE — 97110 THERAPEUTIC EXERCISES: CPT | Mod: GP

## 2025-01-27 PROCEDURE — 80069 RENAL FUNCTION PANEL: CPT | Performed by: INTERNAL MEDICINE

## 2025-01-27 RX ADMIN — CLOPIDOGREL BISULFATE 75 MG: 75 TABLET ORAL at 08:55

## 2025-01-27 RX ADMIN — INSULIN GLARGINE 38 UNITS: 100 INJECTION, SOLUTION SUBCUTANEOUS at 22:04

## 2025-01-27 RX ADMIN — FINASTERIDE 5 MG: 5 TABLET, FILM COATED ORAL at 08:56

## 2025-01-27 RX ADMIN — FAMOTIDINE 20 MG: 20 TABLET, FILM COATED ORAL at 08:56

## 2025-01-27 RX ADMIN — TAMSULOSIN HYDROCHLORIDE 0.4 MG: 0.4 CAPSULE ORAL at 08:55

## 2025-01-27 RX ADMIN — ASPIRIN 81 MG: 81 TABLET, CHEWABLE ORAL at 08:56

## 2025-01-27 RX ADMIN — METOPROLOL SUCCINATE 50 MG: 50 TABLET, EXTENDED RELEASE ORAL at 08:55

## 2025-01-27 RX ADMIN — HEPARIN SODIUM 5000 UNITS: 5000 INJECTION, SOLUTION INTRAVENOUS; SUBCUTANEOUS at 18:29

## 2025-01-27 RX ADMIN — METOPROLOL SUCCINATE 50 MG: 50 TABLET, EXTENDED RELEASE ORAL at 20:03

## 2025-01-27 RX ADMIN — AMLODIPINE BESYLATE 2.5 MG: 2.5 TABLET ORAL at 08:56

## 2025-01-27 RX ADMIN — INSULIN LISPRO 4 UNITS: 100 INJECTION, SOLUTION INTRAVENOUS; SUBCUTANEOUS at 11:16

## 2025-01-27 ASSESSMENT — COGNITIVE AND FUNCTIONAL STATUS - GENERAL
PERSONAL GROOMING: TOTAL
TURNING FROM BACK TO SIDE WHILE IN FLAT BAD: A LOT
CLIMB 3 TO 5 STEPS WITH RAILING: TOTAL
MOBILITY SCORE: 6
DAILY ACTIVITIY SCORE: 6
STANDING UP FROM CHAIR USING ARMS: TOTAL
DRESSING REGULAR UPPER BODY CLOTHING: TOTAL
MOBILITY SCORE: 8
MOVING TO AND FROM BED TO CHAIR: TOTAL
TURNING FROM BACK TO SIDE WHILE IN FLAT BAD: TOTAL
WALKING IN HOSPITAL ROOM: TOTAL
MOVING TO AND FROM BED TO CHAIR: TOTAL
EATING MEALS: TOTAL
MOVING FROM LYING ON BACK TO SITTING ON SIDE OF FLAT BED WITH BEDRAILS: TOTAL
CLIMB 3 TO 5 STEPS WITH RAILING: TOTAL
TOILETING: TOTAL
HELP NEEDED FOR BATHING: TOTAL
WALKING IN HOSPITAL ROOM: TOTAL
MOVING FROM LYING ON BACK TO SITTING ON SIDE OF FLAT BED WITH BEDRAILS: A LOT
STANDING UP FROM CHAIR USING ARMS: TOTAL
DRESSING REGULAR LOWER BODY CLOTHING: TOTAL

## 2025-01-27 ASSESSMENT — PAIN - FUNCTIONAL ASSESSMENT
PAIN_FUNCTIONAL_ASSESSMENT: 0-10

## 2025-01-27 ASSESSMENT — PAIN SCALES - GENERAL
PAINLEVEL_OUTOF10: 0 - NO PAIN

## 2025-01-27 NOTE — CARE PLAN
The patient's goals for the shift include      The clinical goals for the shift include safety, vss

## 2025-01-27 NOTE — PROGRESS NOTES
"  Subjective    Patient denies chest pain, shortness breath, nausea, vomiting or diarrhea.  Patient states he is eating and drinking okay.  He denies any weakness.  Nurse reports he is eating and drinking okay as well.    Objective    Vitals  Visit Vitals  /57 (BP Location: Right arm, Patient Position: Lying)   Pulse 65   Temp 36.2 °C (97.2 °F) (Temporal)   Resp 18   Ht 1.956 m (6' 5\")   Wt 116 kg (256 lb 13.4 oz)   SpO2 98%   BMI 30.46 kg/m²   Smoking Status Never   BSA 2.52 m²       Physical Exam   General: Patient is alert.  No acute distress.  HEENT: Sclera intact.  CVS: RRR.  Lungs: CTAB.   Abdomen: Soft.  Nontender.  Bowel sounds present.  Extremities: No pitting edema bilateral ankles.  Psychiatric: Cooperative.  Neurologic:NIH stroke score of 1 for aphasia.     IOs    Intake/Output Summary (Last 24 hours) at 1/27/2025 1032  Last data filed at 1/27/2025 0906  Gross per 24 hour   Intake 960 ml   Output 1600 ml   Net -640 ml       Labs:   Results from last 72 hours   Lab Units 01/27/25  0420 01/26/25 0414 01/25/25  0419   SODIUM mmol/L 133* 136 137   POTASSIUM mmol/L 4.4 4.2 4.2   CHLORIDE mmol/L 104 105 109*   CO2 mmol/L 23 27 24   BUN mg/dL 33* 26* 26*   CREATININE mg/dL 2.19* 1.89* 1.76*   GLUCOSE mg/dL 158* 59* 138*   CALCIUM mg/dL 7.6* 8.3* 8.2*   ANION GAP mmol/L 10 8* 8*   EGFR mL/min/1.73m*2 31* 37* 41*   PHOSPHORUS mg/dL 3.0 2.9 2.6      Results from last 72 hours   Lab Units 01/27/25  0420 01/26/25  0414 01/25/25  0419 01/24/25  1303   WBC AUTO x10*3/uL 6.5 7.5 8.2 9.5   HEMOGLOBIN g/dL 14.5 13.6 14.4 14.4   HEMATOCRIT % 42.5 40.4* 42.7 42.3   PLATELETS AUTO x10*3/uL 187 171 170 175   NEUTROS PCT AUTO %  --   --   --  69.4   LYMPHS PCT AUTO %  --   --   --  17.4   MONOS PCT AUTO %  --   --   --  9.3   EOS PCT AUTO %  --   --   --  3.2      Lab Results   Component Value Date    CALCIUM 7.6 (L) 01/27/2025    PHOS 3.0 01/27/2025      Lab Results   Component Value Date    CRP 18.4 (H) 12/23/2022    "   [unfilled]       Images  Electrophysiology procedure  Table formatting from the original result was not included.  Procedure Date: 1/23/2025  Attending:    * Eloy Correa - Primary  Resident/Fellow/Other Assistant: Surgeons and Role:  * No surgeons found with a matching role *    Indications:   Pre-op Diagnosis      * Generalized weakness [R53.1]     * Bradycardia [R00.1]     * HB (heart block) [I45.9]    Post-procedure diagnosis:   Post-op Diagnosis     * Generalized weakness [R53.1]     * Bradycardia [R00.1]     * HB (heart block) [I45.9]    Procedure(s):     * PPM IMPLANT DUAL    Procedure Findings:   See below    Description of the Procedure:   After written witnessed informed consent was obtained the procedure from   the patient, the patient was transferred to the EP laboratory. The patient   was in the fasting state. A grounding pad was placed. The patient was set   up for monitoring of surface ECG. The left upper chest was prepped and   draped in the usual sterile fashion. Bupivacaine was administered locally   for anesthetic. A 4 cm incision was placed at the left deltopectoral   groove.  A prepectoral pocket was made to accommodate the device generator. Left   subclavian vein access was obtained with a micropuncture kit for both   leads    The right ventricular lead was delivered to the RV via a peel away 7Fr   sheath to the RV mid septal region under fluoroscopic guidance. Position   was confirmed in the ALANA and PENN projections. The helix was deployed, and   two way communication was set up with the device interrogator. The sensing   was 7 mv, with threshold for ventricular capture at 0.75V at  0.5 ms pulse   width, impedance was  610ohms. The lead was sutured at the suture sleeves   the pectoralis minor muscle. There was no phrenic nerve stimulation   maximal output.    The right atrial lead was delivered to the RA via a peel- away 7Fr sheath   to the RAA region under fluoroscopic guidance.  Position was confirmed in   the [place] and PENN projections. The helix was deployed, and two way   communication was set up with the device interrogator. The sensing was   4.8mv, with threshold for atrial capture at 0.5V at  0.5 ms pulse width,   impedance was 410 ohms. The lead was sutured at the suture sleeves the   pectoralis minor muscle. There was no phrenic nerve stimulation maximal   output.    Both leads with an attached to the generator header and placed in a   preformed generator pocket.   A Tyrex antibiotic pouch was utilized  The incision was closed with 3 layers of suture. A 0 Vicryl interrupted   layer, followed by a 3.0 B. V-loc continuous layer, and a 4.0 V-loc   continuous layer for the subcuticular layer. Steri Strips and a dressing   were applied.    Summary  Successful dual chamber pacemaker implant.     Complications:   none    Stents/Implants:   Implants       Pacemaker    Lead, Pacemaker, Ultipace 58cm - [id number] - Implanted        Inventory item: LEAD, PACEMAKER, ULTIPACE  58CM Model/Cat number:   [id number]    Serial number: [id number] : [employer]    Lot number: [id number] Device identifier: [id number]    Implant Date: 1/23/2025        GUDID Information       Request status Successful        Brand name: Meebo™ Version/Model: [id number]    Company name: [employer] MRI safety info as of 1/23/25: MR   Conditional    Contains dry or latex rubber: No      GMDN P.T. name: Endocardial/interventricular septal pacing lead                As of 1/23/2025       Status: Implanted                      Other Cardiac Implant    Envelope, Antibacterial, Aigis Rx Tyrx, Absorbable, Med - [id number] -   Implanted        Inventory item: ENVELOPE, ANTIBACTERIAL, AIGIS RX TYRX, ABSORBABLE, MED   Model/Cat number: [id number]    : [employer] Lot number: [id number]    Device identifier: [id number]        GUDID Information       Request status Successful        Brand name:  TYRX™ Absorbable Antibacterial Envelope - Medium   Version/Model: [id number]    Company name: [employer] MRI safety info as of 1/23/25: Labeling   does not contain MRI Safety Information    Contains dry or latex rubber: No      GMDN P.T. name: Implantable pulse generator mesh bag, bioabsorbable                As of 1/23/2025       Status: Implanted                      Implant    Pacemaker, Generator, Dual Assurity Mri - [id number] - Implanted        Inventory item: PACEMAKER, GENERATOR, DUAL ASSURITY MRI Model/Cat number:   [id number]    Serial number: [id number] : [employer]    Lot number: [id number] Device identifier: [id number]      GUDID Information       Request status Successful        Brand name: Assurity MRI™ Version/Model: [id number]    Company name: [employer] MRI safety info as of 1/23/25: MR   Conditional    Contains dry or latex rubber: No      GMDN P.T. name: Dual-chamber implantable pacemaker, rate-responsive                As of 1/23/2025       Status: Implanted                            Anticoagulation/Antiplatelet Plan:   none    Estimated Blood Loss:   60 mL    Anesthesia: Moderate Sedation Anesthesia Staff: No anesthesia staff   entered.    Any Specimen(s) Removed:   No specimens collected during this procedure.    Disposition:   stable      Meds  Scheduled medications  amLODIPine, 2.5 mg, oral, Daily  aspirin, 81 mg, oral, Daily  clopidogrel, 75 mg, oral, Daily  ergocalciferol, 1,250 mcg, oral, Every Sunday  famotidine, 20 mg, oral, Daily   Or  famotidine, 20 mg, intravenous, Daily  finasteride, 5 mg, oral, Daily  [Held by provider] heparin (porcine), 5,000 Units, subcutaneous, q12h  insulin glargine, 38 Units, subcutaneous, q24h  insulin lispro, 0-10 Units, subcutaneous, TID AC  metoprolol succinate XL, 50 mg, oral, BID  polyethylene glycol, 17 g, oral, Daily  tamsulosin, 0.4 mg, oral, Daily      Continuous medications     PRN medications  PRN medications: acetaminophen  **OR** acetaminophen **OR** acetaminophen, acetaminophen **OR** acetaminophen **OR** acetaminophen, dextrose, dextrose, glucagon, glucagon, melatonin, ondansetron **OR** ondansetron, sennosides-docusate sodium, traMADol     Assessment and Plan    Tony King is a 69 y.o. male with past medical history of CKD stage IIIb, hypertension, DVT, diabetes, hyperlipidemia, TIA, patent foramen ovale with closure procedure per prior notes, admitted to the hospital with bradycardia, right-sided weakness with acute CVA.    Second-degree AV block   Patient was transferred from [facility] to be evaluated by electrophysiology.  Evaluated by Dr. Correa from electrophysiology services.   Patient with intermittent second-degree AV block  Cardiology placed pacemaker on 1/23/2025.  Patient will need to follow-up with cardiology with Dr. Correa, for wound check and device clinic follow-up as an outpatient.     Right sided weakness  Acute CVA  CT brain no acute intracranial finding  MRI brain revealed acute versus subacute infarct located in the left cerebral hemisphere in the territory PAULIE.  CT angiogram of the head and neck was performed.  Patient was started on aspirin and Plavix.    Tele stroke neurology recommended to continue with aspirin and Plavix for 90 days and aspirin after.  Patient was evaluated by Dr. Nelson from neurology services.  Echocardiogram was performed on 1/21/2025.  Patient has pacemaker placement stated above and can be monitored for arrhythmias as an outpatient with cardiology.  Patient has allergy which is listed as myalgias for statin.  Also history of myositis with elevated CPK, which did trend down to 570 on 1/20/2025.  Recommend to continue to evaluate for statin therapy with neurology as an outpatient to see if it would be warranted.     History of  myositis  Rhabdomyolysis  CK trend down  Hold statins     Type 1 diabetes mellitus  Fairly controlled, hemoglobin A1c 8.1  Increase  Lantus to 38 units q. bedtime, cover with insulin sliding scale  Monitor close diabetic diet  Accu-Chek before meals and at bedtime.  Monitor.     Morbid obesity  BMI 32.13   advised regarding diet.     Acute kidney injury  Most likely secondary to IV contrast  Avoid nephrotoxic drugs  Patient was challenged with fluids  Nephrology following.  Creatinine did go up to 2.19 today; 1.89 on 1/26.  Patient reports eating and drinking well.  We discussed with nephrology to see if giving additional IV fluids are needed.  Monitor.    History of patent foramen ovale status post closure  -On review of the notes from [facility] from 2/2017, there is mention of patient having history of PFO closure.  I did review patient's echo report from 1/20/25 and no mention of patent foramen ovale during that procedure.  Monitor.    DVT prophylaxis  -Heparin placed on hold.  Will reinstate heparin subcu.    Deconditioning  PT /OT evaluated him and recommending high intensity level of care  Fall precaution  Ambulate with assistance     Multinodular thyroid  Refer the patient to Dr. Loredo, endocrinologist, as outpatient for further evaluation.  Patient understood importance of following up with endocrinology    Plan to discharge to acute rehab, awaiting for pre-CERT and any new recommendations from nephrology.

## 2025-01-27 NOTE — PROGRESS NOTES
Physical Therapy    Physical Therapy Treatment    Patient Name: Tony King  MRN: [id number]  Department: [address]  Room: 01/01-B  Today's Date: 1/27/2025  Time Calculation  Start Time: 1311  Stop Time: 1335  Time Calculation (min): 24 min         Assessment/Plan   PT Assessment  Rehab Prognosis: Good  Barriers to Discharge Home: Physical needs, Caregiver assistance, Cognition needs  Caregiver Assistance: Caregiver assistance needed per identified barriers - however, level of patient's required assistance exceeds assistance available at home  Cognition Needs: Insight of patient limited regarding functional ability/needs, Cognition-related high falls risk  Physical Needs: 24hr mobility assistance needed  Evaluation/Treatment Tolerance: Patient tolerated treatment well  Medical Staff Made Aware: Yes  Strengths: Premorbid level of function  Barriers to Participation: Comorbidities  End of Session Communication: Bedside nurse  Assessment Comment: pt is oriented x 4 however difficulty following simple commands without repetitive cues, poor STM with decreased safety insight and carryover. + high fall risk due to functional and cognitive deficits, left UE in sling due to recent pacemaker placement  End of Session Patient Position: Bed, 3 rail up, Alarm on (call button in reach)  PT Plan  Inpatient/Swing Bed or Outpatient: Inpatient  PT Plan  Treatment/Interventions: Bed mobility, Transfer training, Gait training, Stair training, Balance training, Neuromuscular re-education, Strengthening, Endurance training, Therapeutic exercise  PT Plan: Ongoing PT  PT Frequency: 5 times per week  PT Discharge Recommendations: High intensity level of continued care  Equipment Recommended upon Discharge: Other (comment) (TBD)  PT Recommended Transfer Status: Assist x2, Other (comment) (TBD)  PT - OK to Discharge: Yes      General Visit Information:   PT  Visit  PT Received On: 01/27/25  General  PT Missed Visit: Yes  Missed Visit Reason:  Other (Comment) (attempt x 2---1st attempt pt with PCA and nurse; 2nd attempt pt eating.)  Family/Caregiver Present: Yes  Caregiver Feedback: friend present but left room during PT session  Prior to Session Communication: Bedside nurse  Patient Position Received: Bed, 3 rail up, Alarm on  Preferred Learning Style: visual, verbal  General Comment: cleared by nurse for therapy; pt agreeable to therapy. + purewick, telemetry    Subjective   Precautions:  Precautions  Hearing/Visual Limitations: glasses  Medical Precautions: Fall precautions  Post-Surgical Precautions: Other (comment) (left UE pacemaker precautions---sling doffed at start of session)  Precautions Comment: PT donned left UE sling, educated pt in reason for keeping left UE sling donned except for bathing.     Date/Time Vitals Session Patient Position Pulse Resp SpO2 BP MAP (mmHg)    01/27/25 1311 During PT  --  --  --  --  --  --           Vital Signs Comment: O2 sat 96% with HR 62 bpm sitting edge of bed---on room air     Objective   Pain:  Pain Assessment  Pain Assessment: 0-10  0-10 (Numeric) Pain Score: 0 - No pain  Cognition:  Cognition  Overall Cognitive Status: Impaired  Orientation Level: Oriented X4  Following Commands: Follows one step commands with repetition  Cognition Comments: flat affect; pleasantly confused; decreased sustained attention;  Safety/Judgement:  (decreased safety insight and carryover during functional mobility)  Insight: Moderate  Processing Speed: Delayed  Coordination:  Movements are Fluid and Coordinated: No  Lower Body Coordination: decreased timing and accuracy  right > left LE  Postural Control:  Postural Control  Posture Comment: obese with mild to mod forward head, protracted shldrs  Static Sitting Balance  Static Sitting-Balance Support: Feet supported, Right upper extremity supported  Static Sitting-Level of Assistance: Contact guard  Static Sitting-Comment/Number of Minutes: good - with right UE grasping bedrail, x  6 to 7 min while performing cassandra LE active ther exercises  Dynamic Sitting Balance  Dynamic Sitting-Balance Support: Feet supported, Right upper extremity supported  Dynamic Sitting-Level of Assistance: Minimum assistance  Dynamic Sitting-Comments: fluctuating fair - to fair +, working on dynamic reaching activites with right UE in all planes x 3-4 min  Static Standing Balance  Static Standing-Comment/Number of Minutes: unable to due only 1 person assist available  Dynamic Standing Balance  Dynamic Standing-Comments: unable  Extremity/Trunk Assessments:    Activity Tolerance:  Activity Tolerance  Endurance: Decreased tolerance for upright activites  Activity Tolerance Comments: fair -  Treatments:  Therapeutic Exercise  Therapeutic Exercise Performed: Yes  Therapeutic Exercise Activity 1: supine cassandra AP x 20 reps  Therapeutic Exercise Activity 2: hooklying bridges ( PT stabilizing both feet on bed) x 2 sets of 5 reps  Therapeutic Exercise Activity 3: supine left SLR x 10 reps, right x 5 reps, opposite LE in hooklying  Therapeutic Exercise Activity 4: seated cassandra LAQ's x 20 reps with frequent verbal/tactile cues to stay on task  Therapeutic Exercise Activity 5: seated cassandra marching x 10 reps with mod difficulty    Therapeutic Activity  Therapeutic Activity Performed: Yes (see bed mobility, seated scooting, attempted transfer)    Bed Mobility  Bed Mobility: Yes  Bed Mobility 1  Bed Mobility 1: Supine to sitting  Level of Assistance 1: Maximum assistance, Moderate verbal cues, Minimal tactile cues  Bed Mobility Comments 1: head of bed slightly elevated; max assist of 1 for trunk up, scooting right hip to edge of bed, min assist for cassandra LE's off of bed, verbal/tactile cues for active use of right UE using bedrail, left UE in sling.  Bed Mobility 2  Bed Mobility  2: Sitting to supine  Level of Assistance 2: Maximum assistance, Minimal verbal cues  Bed Mobility Comments 2: head of bed flat; max assist of 1 for trunk down, cassandra  LE's onto bed  Bed Mobility 3  Bed Mobility 3: Scooting  Level of Assistance 3: Dependent, +2  Bed Mobility Comments 3: using drawsheet to scoot pt up toward head of bed    Ambulation/Gait Training  Ambulation/Gait Training Performed: No  Transfers  Transfer: No (PT attempted sit to stand at bedside with pt holding onto back of highback stabilized chair----unable to elevate buttocks from bed.)    Stairs  Stairs: No    Other Activity  Other Activity Performed: Yes  Other Activity 1: seated scooting to left, max assist of 1, verbal cues for forward weight shifting, active use of right UE  Other Activity 2: PT donned left UE sling and adjusted for proper fit---pt could NOT verbalize why he should have sling donned at all times at this point, except for bathing    Outcome Measures:  [facility] Basic Mobility  Turning from your back to your side while in a flat bed without using bedrails: A lot  Moving from lying on your back to sitting on the side of a flat bed without using bedrails: A lot  Moving to and from bed to chair (including a wheelchair): Total  Standing up from a chair using your arms (e.g. wheelchair or bedside chair): Total  To walk in hospital room: Total  Climbing 3-5 steps with railing: Total  Basic Mobility - Total Score: 8    Education Documentation    Body Mechanics, taught by Sobeida Pride PT at 1/27/2025  2:09 PM.  Learner: Patient  Readiness: Acceptance  Method: Explanation, Demonstration  Response: Needs Reinforcement  Comment: PT educated pt in post-op pacemaker precautions, use of left UE sling and safe bed mobility technique    Precautions, taught by Sobeida Pride PT at 1/27/2025  2:09 PM.  Learner: Patient  Readiness: Acceptance  Method: Explanation, Demonstration  Response: Needs Reinforcement  Comment: PT educated pt in post-op pacemaker precautions, use of left UE sling and safe bed mobility technique    Mobility Training, taught by Sobeida Pride PT at 1/27/2025  2:09 PM.  Learner:  Patient  Readiness: Acceptance  Method: Explanation, Demonstration  Response: Needs Reinforcement  Comment: PT educated pt in post-op pacemaker precautions, use of left UE sling and safe bed mobility technique    Education Comments  No comments found.               Encounter Problems       Encounter Problems (Active)       Mobility       STG - Patient will ambulate 40' x 1 using rolling walker with CGA (Progressing)       Start:  01/24/25    Expected End:  02/07/25            STG - Patient will negotiate 3 stairs with 1 railing and MIN A (Progressing)       Start:  01/24/25    Expected End:  02/07/25               PT Transfers       STG - Patient to transfer to and from sit to supine with CGA (Progressing)       Start:  01/24/25    Expected End:  02/07/25            STG - Patient will transfer sit to and from stand with MIN A (Progressing)       Start:  01/24/25    Expected End:  02/07/25               Pain - Adult

## 2025-01-27 NOTE — PROGRESS NOTES
"Tony King is a 69 y.o. male on day 5 of admission presenting with Bradycardia.    Subjective   Patient is seen for chronically stage IIIb admitted with secondary heart block had pacemaker placement also had stroke stable he feels better he has no complaints       Objective     Physical Exam  Neck:      Vascular: No carotid bruit.   Cardiovascular:      Rate and Rhythm: Normal rate and regular rhythm.      Heart sounds: No murmur heard.     No friction rub. No gallop.   Pulmonary:      Breath sounds: No wheezing, rhonchi or rales.   Chest:      Chest wall: No tenderness.   Abdominal:      General: There is no distension.      Tenderness: There is no abdominal tenderness. There is no guarding or rebound.   Musculoskeletal:         General: No swelling or tenderness.      Cervical back: Neck supple.      Right lower leg: No edema.      Left lower leg: No edema.   Lymphadenopathy:      Cervical: No cervical adenopathy.         Last Recorded Vitals  Blood pressure (!) 183/76, pulse 65, temperature 36.2 °C (97.2 °F), temperature source Temporal, resp. rate 18, height 1.956 m (6' 5\"), weight 116 kg (256 lb 13.4 oz), SpO2 100%.    Intake/Output last 3 Shifts:  I/O last 3 completed shifts:  In: 600 (5.2 mL/kg) [P.O.:600]  Out: 2400 (20.6 mL/kg) [Urine:2400 (0.6 mL/kg/hr)]  Weight: 116.5 kg     Current Facility-Administered Medications:     acetaminophen (Tylenol) tablet 650 mg, 650 mg, oral, q4h PRN **OR** acetaminophen (Tylenol) oral liquid 650 mg, 650 mg, nasogastric tube, q4h PRN **OR** acetaminophen (Tylenol) suppository 650 mg, 650 mg, rectal, q4h PRN, Alton Lambert MD    acetaminophen (Tylenol) tablet 650 mg, 650 mg, oral, q4h PRN **OR** acetaminophen (Tylenol) oral liquid 650 mg, 650 mg, oral, q4h PRN **OR** acetaminophen (Tylenol) suppository 650 mg, 650 mg, rectal, q4h PRN, Alton Lambert MD    amLODIPine (Norvasc) tablet 2.5 mg, 2.5 mg, oral, Daily, Alton Lambert MD, 2.5 mg at 01/27/25 0856    aspirin " chewable tablet 81 mg, 81 mg, oral, Daily, Alton Lambert MD, 81 mg at 01/27/25 0856    clopidogrel (Plavix) tablet 75 mg, 75 mg, oral, Daily, Alton Lambert MD, 75 mg at 01/27/25 0855    dextrose 50 % injection 12.5 g, 12.5 g, intravenous, q15 min PRN, Alton Lambert MD    dextrose 50 % injection 25 g, 25 g, intravenous, q15 min PRN, Alton Lambert MD    ergocalciferol (Vitamin D-2) capsule 1,250 mcg, 1,250 mcg, oral, Every Sunday, Alton Lambert MD, 1,250 mcg at 01/26/25 0836    famotidine (Pepcid) tablet 20 mg, 20 mg, oral, Daily, 20 mg at 01/27/25 0856 **OR** famotidine PF (Pepcid) injection 20 mg, 20 mg, intravenous, Daily, Alton Lambert MD    finasteride (Proscar) tablet 5 mg, 5 mg, oral, Daily, Alotn Lambert MD, 5 mg at 01/27/25 0856    glucagon (Glucagen) injection 1 mg, 1 mg, intramuscular, q15 min PRN, Alton Lambert MD    glucagon (Glucagen) injection 1 mg, 1 mg, intramuscular, q15 min PRN, Alton Lambert MD    heparin (porcine) injection 5,000 Units, 5,000 Units, subcutaneous, q12h, Dawson Altman DO    insulin glargine (Lantus) injection 38 Units, 38 Units, subcutaneous, q24h, Alton Lambert MD, 38 Units at 01/26/25 2021    insulin lispro injection 0-10 Units, 0-10 Units, subcutaneous, TID AC, Alton Lambert MD, 4 Units at 01/27/25 1116    melatonin tablet 3 mg, 3 mg, oral, Nightly PRN, Alton Lambert MD    metoprolol succinate XL (Toprol-XL) 24 hr tablet 50 mg, 50 mg, oral, BID, Enma Rivera, APRN-CNP, 50 mg at 01/27/25 0855    ondansetron (Zofran) tablet 4 mg, 4 mg, oral, q8h PRN **OR** ondansetron (Zofran) injection 4 mg, 4 mg, intravenous, q8h PRN, Alton Lambert MD    polyethylene glycol (Glycolax, Miralax) packet 17 g, 17 g, oral, Daily, Alton Lambert MD, 17 g at 01/26/25 0836    sennosides-docusate sodium (Linda-Colace) 8.6-50 mg per tablet 1 tablet, 1 tablet, oral, Nightly PRN, Alton Lambert MD    tamsulosin (Flomax) 24 hr capsule 0.4 mg, 0.4 mg,  oral, Daily, Alton Lambert MD, 0.4 mg at 01/27/25 0855    traMADol (Ultram) tablet 50 mg, 50 mg, oral, q8h PRN, Alton Lambert MD   Relevant Results    Results for orders placed or performed during the hospital encounter of 01/22/25 (from the past 96 hours)   POCT GLUCOSE   Result Value Ref Range    POCT Glucose 155 (H) 74 - 99 mg/dL   POCT GLUCOSE   Result Value Ref Range    POCT Glucose 169 (H) 74 - 99 mg/dL   POCT GLUCOSE   Result Value Ref Range    POCT Glucose 160 (H) 74 - 99 mg/dL   POCT GLUCOSE   Result Value Ref Range    POCT Glucose 137 (H) 74 - 99 mg/dL   POCT GLUCOSE   Result Value Ref Range    POCT Glucose 126 (H) 74 - 99 mg/dL   CBC and Auto Differential   Result Value Ref Range    WBC 9.5 4.4 - 11.3 x10*3/uL    nRBC 0.0 0.0 - 0.0 /100 WBCs    RBC 4.95 4.50 - 5.90 x10*6/uL    Hemoglobin 14.4 13.5 - 17.5 g/dL    Hematocrit 42.3 41.0 - 52.0 %    MCV 86 80 - 100 fL    MCH 29.1 26.0 - 34.0 pg    MCHC 34.0 32.0 - 36.0 g/dL    RDW 13.6 11.5 - 14.5 %    Platelets 175 150 - 450 x10*3/uL    Neutrophils % 69.4 40.0 - 80.0 %    Immature Granulocytes %, Automated 0.3 0.0 - 0.9 %    Lymphocytes % 17.4 13.0 - 44.0 %    Monocytes % 9.3 2.0 - 10.0 %    Eosinophils % 3.2 0.0 - 6.0 %    Basophils % 0.4 0.0 - 2.0 %    Neutrophils Absolute 6.61 (H) 1.60 - 5.50 x10*3/uL    Immature Granulocytes Absolute, Automated 0.03 0.00 - 0.50 x10*3/uL    Lymphocytes Absolute 1.65 0.80 - 3.00 x10*3/uL    Monocytes Absolute 0.88 (H) 0.05 - 0.80 x10*3/uL    Eosinophils Absolute 0.30 0.00 - 0.40 x10*3/uL    Basophils Absolute 0.04 0.00 - 0.10 x10*3/uL   Basic metabolic panel   Result Value Ref Range    Glucose 136 (H) 74 - 99 mg/dL    Sodium 138 136 - 145 mmol/L    Potassium 4.1 3.5 - 5.3 mmol/L    Chloride 106 98 - 107 mmol/L    Bicarbonate 27 21 - 32 mmol/L    Anion Gap 9 (L) 10 - 20 mmol/L    Urea Nitrogen 25 (H) 6 - 23 mg/dL    Creatinine 1.85 (H) 0.50 - 1.30 mg/dL    eGFR 38 (L) >60 mL/min/1.73m*2    Calcium 8.2 (L) 8.6 - 10.3  mg/dL   POCT GLUCOSE   Result Value Ref Range    POCT Glucose 194 (H) 74 - 99 mg/dL   POCT GLUCOSE   Result Value Ref Range    POCT Glucose 250 (H) 74 - 99 mg/dL   Renal function panel   Result Value Ref Range    Glucose 138 (H) 74 - 99 mg/dL    Sodium 137 136 - 145 mmol/L    Potassium 4.2 3.5 - 5.3 mmol/L    Chloride 109 (H) 98 - 107 mmol/L    Bicarbonate 24 21 - 32 mmol/L    Anion Gap 8 (L) 10 - 20 mmol/L    Urea Nitrogen 26 (H) 6 - 23 mg/dL    Creatinine 1.76 (H) 0.50 - 1.30 mg/dL    eGFR 41 (L) >60 mL/min/1.73m*2    Calcium 8.2 (L) 8.6 - 10.3 mg/dL    Phosphorus 2.6 2.5 - 4.9 mg/dL    Albumin 3.2 (L) 3.4 - 5.0 g/dL   CBC   Result Value Ref Range    WBC 8.2 4.4 - 11.3 x10*3/uL    nRBC 0.0 0.0 - 0.0 /100 WBCs    RBC 4.96 4.50 - 5.90 x10*6/uL    Hemoglobin 14.4 13.5 - 17.5 g/dL    Hematocrit 42.7 41.0 - 52.0 %    MCV 86 80 - 100 fL    MCH 29.0 26.0 - 34.0 pg    MCHC 33.7 32.0 - 36.0 g/dL    RDW 13.6 11.5 - 14.5 %    Platelets 170 150 - 450 x10*3/uL   POCT GLUCOSE   Result Value Ref Range    POCT Glucose 183 (H) 74 - 99 mg/dL   POCT GLUCOSE   Result Value Ref Range    POCT Glucose 78 74 - 99 mg/dL   POCT GLUCOSE   Result Value Ref Range    POCT Glucose 196 (H) 74 - 99 mg/dL   POCT GLUCOSE   Result Value Ref Range    POCT Glucose 126 (H) 74 - 99 mg/dL   Renal function panel   Result Value Ref Range    Glucose 59 (L) 74 - 99 mg/dL    Sodium 136 136 - 145 mmol/L    Potassium 4.2 3.5 - 5.3 mmol/L    Chloride 105 98 - 107 mmol/L    Bicarbonate 27 21 - 32 mmol/L    Anion Gap 8 (L) 10 - 20 mmol/L    Urea Nitrogen 26 (H) 6 - 23 mg/dL    Creatinine 1.89 (H) 0.50 - 1.30 mg/dL    eGFR 37 (L) >60 mL/min/1.73m*2    Calcium 8.3 (L) 8.6 - 10.3 mg/dL    Phosphorus 2.9 2.5 - 4.9 mg/dL    Albumin 3.1 (L) 3.4 - 5.0 g/dL   CBC   Result Value Ref Range    WBC 7.5 4.4 - 11.3 x10*3/uL    nRBC 0.0 0.0 - 0.0 /100 WBCs    RBC 4.67 4.50 - 5.90 x10*6/uL    Hemoglobin 13.6 13.5 - 17.5 g/dL    Hematocrit 40.4 (L) 41.0 - 52.0 %    MCV 87 80 - 100  fL    MCH 29.1 26.0 - 34.0 pg    MCHC 33.7 32.0 - 36.0 g/dL    RDW 13.5 11.5 - 14.5 %    Platelets 171 150 - 450 x10*3/uL   POCT GLUCOSE   Result Value Ref Range    POCT Glucose 188 (H) 74 - 99 mg/dL   POCT GLUCOSE   Result Value Ref Range    POCT Glucose 158 (H) 74 - 99 mg/dL   POCT GLUCOSE   Result Value Ref Range    POCT Glucose 216 (H) 74 - 99 mg/dL   POCT GLUCOSE   Result Value Ref Range    POCT Glucose 160 (H) 74 - 99 mg/dL   Renal function panel   Result Value Ref Range    Glucose 158 (H) 74 - 99 mg/dL    Sodium 133 (L) 136 - 145 mmol/L    Potassium 4.4 3.5 - 5.3 mmol/L    Chloride 104 98 - 107 mmol/L    Bicarbonate 23 21 - 32 mmol/L    Anion Gap 10 10 - 20 mmol/L    Urea Nitrogen 33 (H) 6 - 23 mg/dL    Creatinine 2.19 (H) 0.50 - 1.30 mg/dL    eGFR 31 (L) >60 mL/min/1.73m*2    Calcium 7.6 (L) 8.6 - 10.3 mg/dL    Phosphorus 3.0 2.5 - 4.9 mg/dL    Albumin 3.1 (L) 3.4 - 5.0 g/dL   CBC   Result Value Ref Range    WBC 6.5 4.4 - 11.3 x10*3/uL    nRBC 0.0 0.0 - 0.0 /100 WBCs    RBC 4.96 4.50 - 5.90 x10*6/uL    Hemoglobin 14.5 13.5 - 17.5 g/dL    Hematocrit 42.5 41.0 - 52.0 %    MCV 86 80 - 100 fL    MCH 29.2 26.0 - 34.0 pg    MCHC 34.1 32.0 - 36.0 g/dL    RDW 13.3 11.5 - 14.5 %    Platelets 187 150 - 450 x10*3/uL   POCT GLUCOSE   Result Value Ref Range    POCT Glucose 125 (H) 74 - 99 mg/dL   POCT GLUCOSE   Result Value Ref Range    POCT Glucose 234 (H) 74 - 99 mg/dL     *Note: Due to a large number of results and/or encounters for the requested time period, some results have not been displayed. A complete set of results can be found in Results Review.       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

## 2025-01-27 NOTE — PROGRESS NOTES
01/27/25 1101   Discharge Planning   Who is requesting discharge planning? Provider   Home or Post Acute Services Post acute facilities (Rehab/SNF/etc)   Type of Post Acute Facility Services Rehab   Expected Discharge Disposition Rehab  ([facility])   Does the patient need discharge transport arranged? Yes   RoundTrip coordination needed? Yes   Has discharge transport been arranged? No     Precert pending for [facility]. Requested precert be escalated. Sent updated notes via careport.     DISCHARGE PLAN NOT SECURE, DO NOT DISCHARGE WITHOUT SPEAKING TO CARE COORDINATION, PRECERT PENDING FOR [facility].

## 2025-01-27 NOTE — NURSING NOTE
Assumed care of pt.  Bedside report received.  Pt in bed resting.  Call light and patient belongings within reach. Bed low and locked.     Ordered pt breakfast.

## 2025-01-27 NOTE — CARE PLAN
Problem: Safety - Adult  Goal: Free from fall injury  Outcome: Progressing     Problem: Pain - Adult  Goal: Verbalizes/displays adequate comfort level or baseline comfort level  Outcome: Progressing     Problem: Chronic Conditions and Co-morbidities  Goal: Patient's chronic conditions and co-morbidity symptoms are monitored and maintained or improved  Outcome: Progressing   The patient's goals for the shift include  rest    The clinical goals for the shift include safety    Over the shift, the patient did make progress toward the following goals.

## 2025-01-27 NOTE — PROGRESS NOTES
Physical Therapy                 Therapy Communication Note    Patient Name: Tony King  MRN: [id number]  Department: [address]  Room: 01/01-B  Today's Date: 1/27/2025     Discipline: Physical Therapy    PT Missed Visit: Yes     Missed Visit Reason: Missed Visit Reason: Other (Comment) (attempt x 2---1st attempt pt with PCA and nurse; 2nd attempt pt eating.)    Missed Time: Attempt    Comment:

## 2025-01-27 NOTE — PROGRESS NOTES
"Occupational Therapy                 Therapy Communication Note    Patient Name: Tony King  MRN: [id number]  Department: [facility] 3 E  Room: 01/01-B  Today's Date: 1/27/2025     Discipline: Occupational Therapy    OT Missed Visit: Yes     Missed Visit Reason: Missed Visit Reason: Patient refused    Missed Time: Attempt    Comment:treatment attempted with pt declining to get get up to chair for breakfast, expressing \"no\" then giggling with no explanation. encouragement + education provided however pt continuing to say \"no\". S/u for breakfast in bed with bed placed in optimal position   "

## 2025-01-28 LAB
ALBUMIN SERPL BCP-MCNC: 3 G/DL (ref 3.4–5)
ANION GAP SERPL CALCULATED.3IONS-SCNC: 8 MMOL/L (ref 10–20)
BUN SERPL-MCNC: 37 MG/DL (ref 6–23)
CALCIUM SERPL-MCNC: 7.8 MG/DL (ref 8.6–10.3)
CHLORIDE SERPL-SCNC: 107 MMOL/L (ref 98–107)
CO2 SERPL-SCNC: 24 MMOL/L (ref 21–32)
CREAT SERPL-MCNC: 2.08 MG/DL (ref 0.5–1.3)
EGFRCR SERPLBLD CKD-EPI 2021: 33 ML/MIN/1.73M*2
ERYTHROCYTE [DISTWIDTH] IN BLOOD BY AUTOMATED COUNT: 13.3 % (ref 11.5–14.5)
GLUCOSE BLD MANUAL STRIP-MCNC: 105 MG/DL (ref 74–99)
GLUCOSE BLD MANUAL STRIP-MCNC: 226 MG/DL (ref 74–99)
GLUCOSE BLD MANUAL STRIP-MCNC: 267 MG/DL (ref 74–99)
GLUCOSE BLD MANUAL STRIP-MCNC: 312 MG/DL (ref 74–99)
GLUCOSE BLD MANUAL STRIP-MCNC: 99 MG/DL (ref 74–99)
GLUCOSE SERPL-MCNC: 154 MG/DL (ref 74–99)
HCT VFR BLD AUTO: 40.4 % (ref 41–52)
HGB BLD-MCNC: 13.8 G/DL (ref 13.5–17.5)
MAGNESIUM SERPL-MCNC: 2 MG/DL (ref 1.6–2.4)
MCH RBC QN AUTO: 29.2 PG (ref 26–34)
MCHC RBC AUTO-ENTMCNC: 34.2 G/DL (ref 32–36)
MCV RBC AUTO: 86 FL (ref 80–100)
NRBC BLD-RTO: 0 /100 WBCS (ref 0–0)
PHOSPHATE SERPL-MCNC: 3.3 MG/DL (ref 2.5–4.9)
PLATELET # BLD AUTO: 184 X10*3/UL (ref 150–450)
POTASSIUM SERPL-SCNC: 4.2 MMOL/L (ref 3.5–5.3)
RBC # BLD AUTO: 4.72 X10*6/UL (ref 4.5–5.9)
SODIUM SERPL-SCNC: 135 MMOL/L (ref 136–145)
WBC # BLD AUTO: 6.9 X10*3/UL (ref 4.4–11.3)

## 2025-01-28 PROCEDURE — 97535 SELF CARE MNGMENT TRAINING: CPT | Mod: GO,CO

## 2025-01-28 PROCEDURE — 2500000001 HC RX 250 WO HCPCS SELF ADMINISTERED DRUGS (ALT 637 FOR MEDICARE OP): Performed by: INTERNAL MEDICINE

## 2025-01-28 PROCEDURE — 83735 ASSAY OF MAGNESIUM: CPT | Performed by: INTERNAL MEDICINE

## 2025-01-28 PROCEDURE — 2500000002 HC RX 250 W HCPCS SELF ADMINISTERED DRUGS (ALT 637 FOR MEDICARE OP, ALT 636 FOR OP/ED): Performed by: INTERNAL MEDICINE

## 2025-01-28 PROCEDURE — 82947 ASSAY GLUCOSE BLOOD QUANT: CPT

## 2025-01-28 PROCEDURE — 36415 COLL VENOUS BLD VENIPUNCTURE: CPT | Performed by: INTERNAL MEDICINE

## 2025-01-28 PROCEDURE — 2060000001 HC INTERMEDIATE ICU ROOM DAILY

## 2025-01-28 PROCEDURE — 2500000004 HC RX 250 GENERAL PHARMACY W/ HCPCS (ALT 636 FOR OP/ED): Performed by: INTERNAL MEDICINE

## 2025-01-28 PROCEDURE — 99232 SBSQ HOSP IP/OBS MODERATE 35: CPT | Performed by: INTERNAL MEDICINE

## 2025-01-28 PROCEDURE — 80069 RENAL FUNCTION PANEL: CPT | Performed by: INTERNAL MEDICINE

## 2025-01-28 PROCEDURE — 85027 COMPLETE CBC AUTOMATED: CPT | Performed by: INTERNAL MEDICINE

## 2025-01-28 PROCEDURE — 2500000001 HC RX 250 WO HCPCS SELF ADMINISTERED DRUGS (ALT 637 FOR MEDICARE OP): Performed by: REGISTERED NURSE

## 2025-01-28 PROCEDURE — 97530 THERAPEUTIC ACTIVITIES: CPT | Mod: GP

## 2025-01-28 PROCEDURE — 97110 THERAPEUTIC EXERCISES: CPT | Mod: GP

## 2025-01-28 PROCEDURE — 97530 THERAPEUTIC ACTIVITIES: CPT | Mod: GO,CO

## 2025-01-28 RX ADMIN — INSULIN LISPRO 4 UNITS: 100 INJECTION, SOLUTION INTRAVENOUS; SUBCUTANEOUS at 11:23

## 2025-01-28 RX ADMIN — FAMOTIDINE 20 MG: 20 TABLET, FILM COATED ORAL at 08:31

## 2025-01-28 RX ADMIN — POLYETHYLENE GLYCOL 3350 17 G: 17 POWDER, FOR SOLUTION ORAL at 08:31

## 2025-01-28 RX ADMIN — INSULIN LISPRO 6 UNITS: 100 INJECTION, SOLUTION INTRAVENOUS; SUBCUTANEOUS at 16:47

## 2025-01-28 RX ADMIN — METOPROLOL SUCCINATE 50 MG: 50 TABLET, EXTENDED RELEASE ORAL at 21:37

## 2025-01-28 RX ADMIN — ASPIRIN 81 MG: 81 TABLET, CHEWABLE ORAL at 08:31

## 2025-01-28 RX ADMIN — CLOPIDOGREL BISULFATE 75 MG: 75 TABLET ORAL at 08:31

## 2025-01-28 RX ADMIN — METOPROLOL SUCCINATE 50 MG: 50 TABLET, EXTENDED RELEASE ORAL at 08:31

## 2025-01-28 RX ADMIN — FINASTERIDE 5 MG: 5 TABLET, FILM COATED ORAL at 08:31

## 2025-01-28 RX ADMIN — AMLODIPINE BESYLATE 2.5 MG: 2.5 TABLET ORAL at 08:31

## 2025-01-28 RX ADMIN — INSULIN GLARGINE 38 UNITS: 100 INJECTION, SOLUTION SUBCUTANEOUS at 21:37

## 2025-01-28 RX ADMIN — HEPARIN SODIUM 5000 UNITS: 5000 INJECTION, SOLUTION INTRAVENOUS; SUBCUTANEOUS at 04:03

## 2025-01-28 RX ADMIN — HEPARIN SODIUM 5000 UNITS: 5000 INJECTION, SOLUTION INTRAVENOUS; SUBCUTANEOUS at 16:47

## 2025-01-28 RX ADMIN — TAMSULOSIN HYDROCHLORIDE 0.4 MG: 0.4 CAPSULE ORAL at 08:31

## 2025-01-28 ASSESSMENT — COGNITIVE AND FUNCTIONAL STATUS - GENERAL
CLIMB 3 TO 5 STEPS WITH RAILING: TOTAL
TURNING FROM BACK TO SIDE WHILE IN FLAT BAD: TOTAL
MOVING FROM LYING ON BACK TO SITTING ON SIDE OF FLAT BED WITH BEDRAILS: A LOT
DRESSING REGULAR LOWER BODY CLOTHING: TOTAL
WALKING IN HOSPITAL ROOM: TOTAL
MOVING FROM LYING ON BACK TO SITTING ON SIDE OF FLAT BED WITH BEDRAILS: TOTAL
MOBILITY SCORE: 8
EATING MEALS: A LITTLE
MOBILITY SCORE: 6
EATING MEALS: TOTAL
DRESSING REGULAR UPPER BODY CLOTHING: A LOT
TOILETING: TOTAL
TOILETING: TOTAL
MOVING TO AND FROM BED TO CHAIR: TOTAL
MOVING TO AND FROM BED TO CHAIR: TOTAL
CLIMB 3 TO 5 STEPS WITH RAILING: TOTAL
STANDING UP FROM CHAIR USING ARMS: TOTAL
PERSONAL GROOMING: TOTAL
HELP NEEDED FOR BATHING: TOTAL
DAILY ACTIVITIY SCORE: 6
DRESSING REGULAR UPPER BODY CLOTHING: TOTAL
STANDING UP FROM CHAIR USING ARMS: TOTAL
DAILY ACTIVITIY SCORE: 11
PERSONAL GROOMING: A LOT
DRESSING REGULAR LOWER BODY CLOTHING: TOTAL
WALKING IN HOSPITAL ROOM: TOTAL
TURNING FROM BACK TO SIDE WHILE IN FLAT BAD: A LOT
HELP NEEDED FOR BATHING: A LOT

## 2025-01-28 ASSESSMENT — ACTIVITIES OF DAILY LIVING (ADL)
HOME_MANAGEMENT_TIME_ENTRY: 10
HOME_MANAGEMENT_TIME_ENTRY: 10

## 2025-01-28 ASSESSMENT — PAIN - FUNCTIONAL ASSESSMENT
PAIN_FUNCTIONAL_ASSESSMENT: 0-10
PAIN_FUNCTIONAL_ASSESSMENT: FLACC (FACE, LEGS, ACTIVITY, CRY, CONSOLABILITY)
PAIN_FUNCTIONAL_ASSESSMENT: 0-10
PAIN_FUNCTIONAL_ASSESSMENT: 0-10

## 2025-01-28 ASSESSMENT — PAIN SCALES - GENERAL
PAINLEVEL_OUTOF10: 0 - NO PAIN

## 2025-01-28 NOTE — PROGRESS NOTES
Occupational Therapy    OT Treatment    Patient Name: Tony King  MRN: [id number]  Department: [facility] E  Room: 01/01-B  Today's Date: 1/28/2025  Time Calculation  Start Time: 1256  Stop Time: 1345  Time Calculation (min): 49 min        Assessment:  OT Assessment: pt tolerated treatment well but is demonstrated deficits in RUE and activity tolerance which impedes full participation in ADL and transfers tasks. Pt would benefit from continued OT services to increase strength, endurance, and activity tolerance for ADLs  Prognosis: Good  Barriers to Discharge Home: Cognition needs, Physical needs, Caregiver assistance  Caregiver Assistance: Caregiver assistance needed per identified barriers - however, level of patient's required assistance exceeds assistance available at home  Cognition Needs: Recollection or understanding of precautions/restrictions limited, Insight of patient limited regarding functional ability/needs  Physical Needs: 24hr mobility assistance needed, 24hr ADL assistance needed, High falls risk due to function or environment  Evaluation/Treatment Tolerance: Patient tolerated treatment well, Patient limited by fatigue  Medical Staff Made Aware: Yes  End of Session Communication: Bedside nurse  End of Session Patient Position: Bed, 3 rail up, Alarm on (call light in reach and all needs met; RN made aware of OT/PT session)  OT Assessment Results: Decreased ADL status, Decreased upper extremity range of motion, Decreased upper extremity strength, Decreased safe judgment during ADL, Decreased cognition, Decreased endurance, Decreased fine motor control, Decreased functional mobility, Decreased gross motor control, Decreased trunk control for functional activities  Prognosis: Good  Barriers to Discharge: Decreased caregiver support, Other (Comment) (cognition needs)  Evaluation/Treatment Tolerance: Patient tolerated treatment well, Patient limited by fatigue  Medical Staff Made Aware: Yes  Strengths: Attitude  of self, Premorbid level of function  Barriers to Participation: Comorbidities  Plan:  Treatment Interventions: ADL retraining, Functional transfer training, Endurance training, UE strengthening/ROM, Patient/family training, Equipment evaluation/education, Compensatory technique education  OT Frequency: 5 times per week  OT Discharge Recommendations: High intensity level of continued care  OT Recommended Transfer Status: Assist of 2  OT - OK to Discharge: Yes  Treatment Interventions: ADL retraining, Functional transfer training, Endurance training, UE strengthening/ROM, Patient/family training, Equipment evaluation/education, Compensatory technique education    Subjective   Previous Visit Info:  OT Last Visit  OT Received On: 01/28/25  General:  General  Reason for Referral: pt is a 70 y/o male admitted  with bradycardia and heart block. s/p permanent pacemaker placement 1/23/25. MR brain revealed Acute or subacute infarcts located within the left cerebral  hemisphere in the [place] of [place].  impaired mobility  Referred By: Alton Lambert MD  Past Medical History Relevant to Rehab: necrotizing myopathy, DM1, CKD, essential HTN, DVT, PE, TIA/CVA; spinal stenosis; hyperlipidemia; OA  Family/Caregiver Present: No  Co-Treatment: PT  Co-Treatment Reason: to maximize pt safety and outcomes  Prior to Session Communication: Bedside nurse  Patient Position Received: Bed, 3 rail up, Alarm on  Preferred Learning Style: visual, verbal  General Comment: cleared by nurse for OT treatment; pt agreeable to therapy; LUE sling NOT donned upon arrival. + telemetry, purewick  Precautions:  Hearing/Visual Limitations: glasses  Medical Precautions: Fall precautions  Post-Surgical Precautions: Other (comment) (post-op pacemaker precautions; left UE in sling excpet for bathing/dressing)  Precautions Comment: education on LUE sling and precautions provided during ADL tasks (UB dressing). Pt unable to restate why he needs to be  donned but shakes head that he understands when explained. Pt will need thorough reeducation      Vital Signs Comment: O2 sat 96% with HR 65 bpm, /83(91) sitting edge of bed on room air     Pain:  Pain Assessment  Pain Assessment: 0-10  0-10 (Numeric) Pain Score: 0 - No pain    Objective    Cognition:  Cognition  Overall Cognitive Status: Impaired  Orientation Level: Oriented X4  Following Commands: Follows one step commands with repetition  Safety Judgment: Decreased awareness of need for safety precautions  Cognition Comments: pt cooperative and pleasant but displays confusion and decreased attention; mod receptive/expressive aphasia  Attention: Exceptions to WFL  Sustained Attention: Impaired  Memory: Exceptions to WFL  Short-Term Memory: Impaired  Insight: Moderate  Processing Speed: Delayed  Coordination:  Movements are Fluid and Coordinated: No  Upper Body Coordination: RUE decreased timing and accuracy of movements  Activities of Daily Living:    UE Dressing  UE Dressing Level of Assistance: Moderate assistance, Minimum assistance  UE Dressing Where Assessed: Bed level  UE Dressing Comments: Min - Mod A to thread BUEs through gown due to decreased ROM and accruacy of RUE and precautions on LUE; pt impuslive with LUE and required verbal cues to adhere to precautions    Toileting  Toileting Adaptive Equipment: Other (Comment) (purewick)  Toileting Level of Assistance: Dependent  Where Assessed: Bed level  Toileting Comments: pt purewick loose/doffed upon arrival; dependent for posterior and anterior hygiene by rollling R and L at bed level; PCNA donned new purwick during treatment    Bed Mobility/Transfers:   Bed Mobility  Bed Mobility: Yes  Bed Mobility 1  Bed Mobility 1: Supine to sitting  Level of Assistance 1: Maximum assistance, Moderate verbal cues, Minimal tactile cues, +2  Bed Mobility Comments 1: HOB elevated; Max A x2 to for trunk up into sitting and to bring BLEs to EOB; verbal and tactile cues  to utilizie RUE instead of LUE to adhere to precautions  Bed Mobility 2  Bed Mobility  2: Sitting to supine  Level of Assistance 2: Maximum assistance, +2  Bed Mobility Comments 2: bed flat; Max A to tranistion BLEs into supine and to guide trunk down into supine  Bed Mobility 3  Bed Mobility 3: Rolling right, Rolling left  Level of Assistance 3: Maximum assistance, Moderate verbal cues  Bed Mobility Comments 3: pt rolled R and L during toileting task x3 trials with max A x1 and verbal and tactile cues for hand and BLE placement; PCNA changed purewick and perfromed a completed bed change due to urine soaking sheet  Bed Mobility 4  Bed Mobility 4: Scooting  Level of Assistance 4: Dependent, +2  Bed Mobility Comments 4: utilized draw sheet to scoot to HOB    Transfers  Transfer: Yes  Transfer 1  Transfer From 1: Bed to  Transfer to 1: Stand  Technique 1: Sit to stand  Transfer Device 1: Walker  Transfer Level of Assistance 1: Maximum assistance, +2, Moderate verbal cues, Moderate tactile cues  Trials/Comments 1: attempted sit to stand x2 with Max A x2 to establish COG over BRITTANIE; verbal and tactile cues for hand and BLE placement; pt unable to clear buttocks from bed due to no draw sheet under buttocks to aid in transfer task  Transfers 2  Transfer From 2: Stand to  Transfer to 2: Bed  Technique 2: Stand to sit  Transfer Device 2: Walker  Transfer Level of Assistance 2: Maximum assistance, +2, Minimal verbal cues  Trials/Comments 2: Max A x2 for controlled descent; RUE stabilized on RUE throughout; perfromed 2 trials    Functional Mobility:  Functional Mobility  Functional Mobility Performed: No  Sitting Balance:  Static Sitting Balance  Static Sitting-Balance Support: Right upper extremity supported, Feet supported  Static Sitting-Level of Assistance: Moderate assistance, Minimum assistance, Contact guard  Static Sitting-Comment/Number of Minutes: Mod A to CGA while seated on EOB to increase functional sitting  tolerance for ADL participation; L lateral lean and retropulsive  Dynamic Sitting Balance  Dynamic Sitting-Balance Support: Feet supported, Right upper extremity supported (alternating RUE from supported to unsupported; LUE in sling)  Dynamic Sitting-Level of Assistance: Moderate assistance, Minimum assistance, Contact guard  Dynamic Sitting-Balance: Forward lean  Dynamic Sitting-Comments: Pt able to sit on EOB for ~15 minutes to increase seated functional balance for ADL particiaption; Pt engaged in therapeutic excerises to increase seated balance; Mod A to CGA due to retropuslive and L lateral lean    Therapy/Activity:   Therapeutic Exercise  Therapeutic Exercise Performed: Yes  Therapeutic Exercise Activity 1: pt engaged in RUE 15 reps x1 of elbow flexion/extension and extension while seated on EOB to increase ROM, coordination, strentgh, endurance, and activity tolerance for ADL participation.  Therapeutic Exercise Activity 2: pt engaged in RUE 15 reps x1 of shoulder flexion/extension and extension while seated on EOB to increase ROM, coordination, strentgh, endurance, and activity tolerance for ADL participation.  Therapeutic Exercise Activity 3: pt engaged in RUE 15 reps x1 of scapular elevation/depression while seated on EOB to increase ROM, coordination, strentgh, endurance, and activity tolerance for ADL participation.    Therapeutic Activity  Therapeutic Activity Performed: Yes  Therapeutic Activity 1: see bed mobility, transfers, and seated balance tasks    Other Activity:  Other Activity Performed: Yes  Other Activity 1: donned/doffed LUE sling before and after UB dressing activity while at bed level; education on the importance of wearing sling and precautions    Outcome Measures:  [facility] Daily Activity  Putting on and taking off regular lower body clothing: Total  Bathing (including washing, rinsing, drying): A lot  Putting on and taking off regular upper body clothing: A lot  Toileting, which includes  using toilet, bedpan or urinal: Total  Taking care of personal grooming such as brushing teeth: A lot  Eating Meals: A little  Daily Activity - Total Score: 11        Education Documentation  Body Mechanics, taught by ISHA Rodriguez at 1/28/2025  3:02 PM.  Learner: Patient  Readiness: Acceptance  Method: Explanation  Response: Needs Reinforcement  Comment: pt educated on OT POC, precautions, body mechanics, and safety/balance technqiues during ADL retraining and transfer tasks.    Precautions, taught by ISHA Rodriguez at 1/28/2025  3:02 PM.  Learner: Patient  Readiness: Acceptance  Method: Explanation  Response: Needs Reinforcement  Comment: pt educated on OT POC, precautions, body mechanics, and safety/balance technqiues during ADL retraining and transfer tasks.    ADL Training, taught by ISHA Rodriguez at 1/28/2025  3:02 PM.  Learner: Patient  Readiness: Acceptance  Method: Explanation  Response: Needs Reinforcement  Comment: pt educated on OT POC, precautions, body mechanics, and safety/balance technqiues during ADL retraining and transfer tasks.      Goals:  Encounter Problems       Encounter Problems (Active)       OT Goals       Pt will complete self-feeding with mod indep. (Progressing)       Start:  01/24/25    Expected End:  02/24/25            Pt will complete all grooming tasks with close S to setup while seated. (Progressing)       Start:  01/24/25    Expected End:  02/24/25            Pt will complete UB dressing/bathing with setup and LB dressing/bathing with mod to min A using adaptive equipment as needed.  (Progressing)       Start:  01/24/25    Expected End:  02/24/25            Pt will complete all toileting tasks with mod to min A. (Progressing)       Start:  01/24/25    Expected End:  02/24/25            Pt will complete all functional transfers and mobility with min A using a RW. (Progressing)       Start:  01/24/25    Expected End:  02/24/25

## 2025-01-28 NOTE — PROGRESS NOTES
Seen for chronic disease stage IIIHis renal function remains stable throughout hospitalization his creatinine level 2.0 his GFR 33 mL/min okay to discharge from renal point of view I will sign off please call me if needed which is better than his baseline

## 2025-01-28 NOTE — PROGRESS NOTES
Physical Therapy    Physical Therapy Treatment    Patient Name: Tony King  MRN: [id number]  Department: [address]  Room: 01/01-B  Today's Date: 1/28/2025  Time Calculation  Start Time: 1255  Stop Time: 1338  Time Calculation (min): 43 min         Assessment/Plan   PT Assessment  Barriers to Discharge Home: Physical needs, Caregiver assistance, Cognition needs  Caregiver Assistance: Caregiver assistance needed per identified barriers - however, level of patient's required assistance exceeds assistance available at home  Cognition Needs: Insight of patient limited regarding functional ability/needs, Cognition-related high falls risk  Physical Needs: 24hr mobility assistance needed  Evaluation/Treatment Tolerance: Patient limited by fatigue  Medical Staff Made Aware: Yes  Strengths: Premorbid level of function  Barriers to Participation: Comorbidities  End of Session Communication: Bedside nurse  Assessment Comment: pt continues to demonstrate global aphasia, need for max assist of 1-2 for the above limited mobility; + high fall risk due to functional and cognitive deficits  End of Session Patient Position: Bed, 3 rail up, Alarm on (VIEIRA present for ADL's)  PT Plan  Inpatient/Swing Bed or Outpatient: Inpatient  PT Plan  Treatment/Interventions: Bed mobility, Transfer training, Gait training, Stair training, Balance training, Neuromuscular re-education, Strengthening, Endurance training, Therapeutic exercise  PT Plan: Ongoing PT  PT Frequency: 5 times per week  PT Discharge Recommendations: High intensity level of continued care  Equipment Recommended upon Discharge: Other (comment) (TBD)  PT Recommended Transfer Status: Assist x2, Assistive device, Other (comment) (use of drawsheet under buttocks with 3rd person blocking knees)  PT - OK to Discharge: Yes      General Visit Information:   PT  Visit  PT Received On: 01/28/25  General  Family/Caregiver Present: No  Co-Treatment: OT  Co-Treatment Reason: to maximize pt  safety  Prior to Session Communication: Bedside nurse  Patient Position Received: Bed, 3 rail up, Alarm on  Preferred Learning Style: visual, verbal  General Comment: cleared by nurse for therapy; pt agreeable to therapy; left UE sling NOT donned. + telemetry, purewick    Subjective   Precautions:  Precautions  Medical Precautions: Fall precautions  Post-Surgical Precautions: Other (comment) (post-op pacemaker precautions; left UE in sling excpet for bathing/dressing)  Precautions Comment: PT donned left UE sling, educated pt in reason for keeping left UE sling donned except for dressing/bathing. Pt not able to verbalize reason for wearing sling; Needs full  re-education     Date/Time Vitals Session Patient Position Pulse Resp SpO2 BP MAP (mmHg)    01/28/25 1255 During PT  --  --  --  --  --  --           Vital Signs Comment: O2 sat 96% with HR 65 bpm, /83(91) sitting edge of bed on room air     Objective   Pain:  Pain Assessment  Pain Assessment: 0-10  0-10 (Numeric) Pain Score: 0 - No pain  Cognition:  Cognition  Overall Cognitive Status: Impaired  Orientation Level: Oriented X4  Safety Judgment: Decreased awareness of need for safety precautions  Cognition Comments: pleasantly confused; decreased sustained attention; + mod receptive/expressive aphasia  Attention: Exceptions to WFL  Sustained Attention: Impaired  Memory: Exceptions to WFL  Short-Term Memory: Impaired  Safety/Judgement:  (decreased safety insight and carryover during functional mobility)  Insight: Moderate  Processing Speed: Delayed  Coordination:  Movements are Fluid and Coordinated: No  Lower Body Coordination: decreased timing and accuracy  right > left LE  Postural Control:  Postural Control  Posture Comment: obese with mild to mod forward head, protracted shldrs  Extremity/Trunk Assessments:    Activity Tolerance:  Activity Tolerance  Endurance: Decreased tolerance for upright activites  Activity Tolerance Comments: fair  -/fair  Treatments:  Therapeutic Exercise  Therapeutic Exercise Performed: Yes  Therapeutic Exercise Activity 1: seated cassandra LAQ's x 15 reps---encouraging counting out loud  Therapeutic Exercise Activity 2: seated cassandra marching x 15 reps---encouraging counting out loud  Therapeutic Exercise Activity 3: hooklying bridges x 5 reps, PT stabilized cassandra feet on bed    Therapeutic Activity  Therapeutic Activity Performed:  (see bed mobility, transfers, sitting activites)    Balance/Neuromuscular Re-Education  Balance/Neuromuscular Re-Education Activity Performed: Yes  Balance/Neuromuscular Re-Education Activity 1: pt sat edge of bed x 15 min while working on static/dynamic sititng balance activities with right UE, bilateral LE's---pt required fluctuating contact guard of 1 to mod assist of 1 at posterior trunk due to fluctuating posterior lean---pt used right hand grasp on bedrail intermittantly    Bed Mobility  Bed Mobility: Yes  Bed Mobility 1  Bed Mobility 1: Supine to sitting  Level of Assistance 1: Maximum assistance, Moderate verbal cues, Minimal tactile cues (of 1-2)  Bed Mobility Comments 1: head of bed elevated; max assist of 1-2 for trunk up, scooting pelvis to edge of bed, cassandra LE's off of bed, verbal/tactile cues for active us eof right UE only ( left UE in sling)  Bed Mobility 2  Bed Mobility  2: Sitting to supine  Level of Assistance 2: Maximum assistance, +2  Bed Mobility Comments 2: head of bed flat; max assist of 2 for trunk down, cassandra LE's onto bed  Bed Mobility 3  Bed Mobility 3: Rolling right, Rolling left  Level of Assistance 3: Maximum assistance, Moderate verbal cues, Minimal tactile cues  Bed Mobility Comments 3: pt rolled to left/right x 2 trials each way, max assist of 1 while dependent pericare, change of purewick and sheets performed due to urine soaked sheets  Bed Mobility 4  Bed Mobility 4: Scooting  Level of Assistance 4: Dependent, +2  Bed Mobility Comments 4: use of drawsheet to scoot up toward  head of bed, head of bed flat    Ambulation/Gait Training  Ambulation/Gait Training Performed: No  Transfers  Transfer: Yes  Transfer 1  Transfer From 1: Bed to  Transfer to 1: Stand  Technique 1: Sit to stand  Transfer Device 1: Walker  Transfer Level of Assistance 1: Maximum assistance, +2, Moderate verbal cues, Moderate tactile cues  Trials/Comments 1: attempted x 2 trials, max assist of 2 for trunk up, right hand on stabilized FWW 1st trial, bed 2nd trial, verbal/tactile cues for forward weight shifting and active use of right UE. Pt unable to fully clear buttocks from bed---no drawsheet under buttocks to use  Transfers 2  Transfer From 2: Stand to  Transfer to 2: Bed  Technique 2: Stand to sit  Transfer Device 2: Walker  Transfer Level of Assistance 2: Maximum assistance, +2, Minimal verbal cues  Trials/Comments 2: max assist of 2 for trunk down ( buttocks fully on bed). right hand on stabilized FWw    Stairs  Stairs: No    Other Activity  Other Activity Performed: Yes  Other Activity 1: PT donned left UE sling with pt in long sitting    Outcome Measures:  [facility] Basic Mobility  Turning from your back to your side while in a flat bed without using bedrails: A lot  Moving from lying on your back to sitting on the side of a flat bed without using bedrails: A lot  Moving to and from bed to chair (including a wheelchair): Total  Standing up from a chair using your arms (e.g. wheelchair or bedside chair): Total  To walk in hospital room: Total  Climbing 3-5 steps with railing: Total  Basic Mobility - Total Score: 8    Education Documentation  Precautions, taught by Sobeida Pride PT at 1/28/2025  2:33 PM.  Learner: Patient  Readiness: Acceptance  Method: Explanation, Demonstration  Response: Needs Reinforcement  Comment: PT educated pt in reasons for sling use and safe bed mobility technique.    Mobility Training, taught by Sobeida Pride PT at 1/28/2025  2:33 PM.  Learner: Patient  Readiness: Acceptance  Method:  Explanation, Demonstration  Response: Needs Reinforcement  Comment: PT educated pt in reasons for sling use and safe bed mobility technique.    Education Comments  No comments found.               Encounter Problems       Encounter Problems (Active)       Mobility       STG - Patient will ambulate 40' x 1 using rolling walker with CGA (Progressing)       Start:  01/24/25    Expected End:  02/07/25            STG - Patient will negotiate 3 stairs with 1 railing and MIN A (Progressing)       Start:  01/24/25    Expected End:  02/07/25               PT Transfers       STG - Patient to transfer to and from sit to supine with CGA (Progressing)       Start:  01/24/25    Expected End:  02/07/25            STG - Patient will transfer sit to and from stand with MIN A (Progressing)       Start:  01/24/25    Expected End:  02/07/25               Pain - Adult

## 2025-01-28 NOTE — PROGRESS NOTES
"Tony King is a 69 y.o. male on day 5 of admission presenting with Bradycardia.    Subjective   No complaints waiting for placement telemetry with a sensed V paced       Objective     Physical Exam  Skin:     Findings: Erythema: .rc.         Last Recorded Vitals  Blood pressure 127/56, pulse 61, temperature 36.1 °C (97 °F), temperature source Temporal, resp. rate 18, height 1.956 m (6' 5\"), weight 116 kg (256 lb 13.4 oz), SpO2 98%.  Intake/Output last 3 Shifts:  I/O last 3 completed shifts:  In: 2180 (18.7 mL/kg) [P.O.:2180]  Out: 2200 (18.9 mL/kg) [Urine:2200 (0.5 mL/kg/hr)]  Weight: 116.5 kg     Relevant Results  Results for orders placed or performed during the hospital encounter of 01/22/25 (from the past 24 hours)   POCT GLUCOSE   Result Value Ref Range    POCT Glucose 160 (H) 74 - 99 mg/dL   Renal function panel   Result Value Ref Range    Glucose 158 (H) 74 - 99 mg/dL    Sodium 133 (L) 136 - 145 mmol/L    Potassium 4.4 3.5 - 5.3 mmol/L    Chloride 104 98 - 107 mmol/L    Bicarbonate 23 21 - 32 mmol/L    Anion Gap 10 10 - 20 mmol/L    Urea Nitrogen 33 (H) 6 - 23 mg/dL    Creatinine 2.19 (H) 0.50 - 1.30 mg/dL    eGFR 31 (L) >60 mL/min/1.73m*2    Calcium 7.6 (L) 8.6 - 10.3 mg/dL    Phosphorus 3.0 2.5 - 4.9 mg/dL    Albumin 3.1 (L) 3.4 - 5.0 g/dL   CBC   Result Value Ref Range    WBC 6.5 4.4 - 11.3 x10*3/uL    nRBC 0.0 0.0 - 0.0 /100 WBCs    RBC 4.96 4.50 - 5.90 x10*6/uL    Hemoglobin 14.5 13.5 - 17.5 g/dL    Hematocrit 42.5 41.0 - 52.0 %    MCV 86 80 - 100 fL    MCH 29.2 26.0 - 34.0 pg    MCHC 34.1 32.0 - 36.0 g/dL    RDW 13.3 11.5 - 14.5 %    Platelets 187 150 - 450 x10*3/uL   POCT GLUCOSE   Result Value Ref Range    POCT Glucose 125 (H) 74 - 99 mg/dL   POCT GLUCOSE   Result Value Ref Range    POCT Glucose 234 (H) 74 - 99 mg/dL   POCT GLUCOSE   Result Value Ref Range    POCT Glucose 135 (H) 74 - 99 mg/dL   POCT GLUCOSE   Result Value Ref Range    POCT Glucose 210 (H) 74 - 99 mg/dL             "       Assessment/Plan   Assessment & Plan  Bradycardia    Generalized weakness    HB (heart block)    1/27: Doing well status post permanent pacemaker for intermittent heart block, currently a sensed V paced.  Post device started on metoprolol 50 mg twice daily with good blood pressure readings on  Site intact with original dressing can be removed Tuesday  Device checks as scheduled  Of note does not need sling to be on any more       I spent 35 minutes in the professional and overall care of this patient.      Enma Rivera, APRN-CNP

## 2025-01-28 NOTE — PROGRESS NOTES
01/28/25 1307   Discharge Planning   Expected Discharge Disposition Rehab   Does the patient need discharge transport arranged? Yes   RoundTrip coordination needed? Yes     Auth remains pending for [facility].    Auth was submitted by facility on 1/24.  Update that we are awaiting a SCA for approval.   Call placed to [employer] to escalate pending precert.      Also confirmed with [facility] that [facility], [facility], and [facility] are the only SNF facilities in network.   SNF referrals sent at this time to the facilities above for review in the event IRF auth is denied.    Discharge plan NOT secure  DO NOT DC without speaking to care coordination

## 2025-01-28 NOTE — PROGRESS NOTES
"  Subjective    Patient denies any weakness and also denies chest pain, shortness of breath, nausea, vomiting or diarrhea.    Objective    Vitals  Visit Vitals  /84 (BP Location: Right arm, Patient Position: Lying)   Pulse 62   Temp 37 °C (98.6 °F) (Temporal)   Resp 18   Ht 1.956 m (6' 5\")   Wt 114 kg (251 lb 8.7 oz)   SpO2 98%   BMI 29.83 kg/m²   Smoking Status Never   BSA 2.49 m²       Physical Exam   General: Patient is alert.  NAD.   HEENT: Sclera intact.  CVS: RRR.  Lungs: CTAB.   Abdomen: Soft. NT. +BS.  Extremities: No pitting edema bilat ankles.  Psychiatric: Cooperative.  Neurologic:NIH stroke score of 2-3 (1 point for right leg drift and 1-2 points for aphasia).     IOs    Intake/Output Summary (Last 24 hours) at 1/28/2025 1650  Last data filed at 1/28/2025 0859  Gross per 24 hour   Intake 900 ml   Output 2600 ml   Net -1700 ml       Labs:   Results from last 72 hours   Lab Units 01/28/25 0419 01/27/25  0420 01/26/25 0414   SODIUM mmol/L 135* 133* 136   POTASSIUM mmol/L 4.2 4.4 4.2   CHLORIDE mmol/L 107 104 105   CO2 mmol/L 24 23 27   BUN mg/dL 37* 33* 26*   CREATININE mg/dL 2.08* 2.19* 1.89*   GLUCOSE mg/dL 154* 158* 59*   CALCIUM mg/dL 7.8* 7.6* 8.3*   ANION GAP mmol/L 8* 10 8*   EGFR mL/min/1.73m*2 33* 31* 37*   PHOSPHORUS mg/dL 3.3 3.0 2.9      Results from last 72 hours   Lab Units 01/28/25 0419 01/27/25  0420 01/26/25 0414   WBC AUTO x10*3/uL 6.9 6.5 7.5   HEMOGLOBIN g/dL 13.8 14.5 13.6   HEMATOCRIT % 40.4* 42.5 40.4*   PLATELETS AUTO x10*3/uL 184 187 171      Lab Results   Component Value Date    CALCIUM 7.8 (L) 01/28/2025    PHOS 3.3 01/28/2025      Lab Results   Component Value Date    CRP 18.4 (H) 12/23/2022      [unfilled]       Images  Electrophysiology procedure  Table formatting from the original result was not included.  Procedure Date: 1/23/2025  Attending:    * Eloy Correa - Primary  Resident/Fellow/Other Assistant: Surgeons and Role:  * No surgeons found with a " matching role *    Indications:   Pre-op Diagnosis      * Generalized weakness [R53.1]     * Bradycardia [R00.1]     * HB (heart block) [I45.9]    Post-procedure diagnosis:   Post-op Diagnosis     * Generalized weakness [R53.1]     * Bradycardia [R00.1]     * HB (heart block) [I45.9]    Procedure(s):     * PPM IMPLANT DUAL    Procedure Findings:   See below    Description of the Procedure:   After written witnessed informed consent was obtained the procedure from   the patient, the patient was transferred to the EP laboratory. The patient   was in the fasting state. A grounding pad was placed. The patient was set   up for monitoring of surface ECG. The left upper chest was prepped and   draped in the usual sterile fashion. Bupivacaine was administered locally   for anesthetic. A 4 cm incision was placed at the left deltopectoral   groove.  A prepectoral pocket was made to accommodate the device generator. Left   subclavian vein access was obtained with a micropuncture kit for both   leads    The right ventricular lead was delivered to the RV via a peel away 7Fr   sheath to the RV mid septal region under fluoroscopic guidance. Position   was confirmed in the [place] and [employer] projections. The helix was deployed, and   two way communication was set up with the device interrogator. The sensing   was 7 mv, with threshold for ventricular capture at 0.75V at  0.5 ms pulse   width, impedance was  610ohms. The lead was sutured at the suture sleeves   the pectoralis minor muscle. There was no phrenic nerve stimulation   maximal output.    The right atrial lead was delivered to the RA via a peel- away 7Fr sheath   to the RAA region under fluoroscopic guidance. Position was confirmed in   the [place] and [employer] projections. The helix was deployed, and two way   communication was set up with the device interrogator. The sensing was   4.8mv, with threshold for atrial capture at 0.5V at  0.5 ms pulse width,   impedance was 410 ohms. The lead  was sutured at the suture sleeves the   pectoralis minor muscle. There was no phrenic nerve stimulation maximal   output.    Both leads with an attached to the generator header and placed in a   preformed generator pocket.   A Tyrex antibiotic pouch was utilized  The incision was closed with 3 layers of suture. A 0 Vicryl interrupted   layer, followed by a 3.0 B. V-loc continuous layer, and a 4.0 V-loc   continuous layer for the subcuticular layer. Steri Strips and a dressing   were applied.    Summary  Successful dual chamber pacemaker implant.     Complications:   none    Stents/Implants:   Implants       Pacemaker    Lead, Pacemaker, Ultipace 58cm - [id number] - Implanted        Inventory item: LEAD, PACEMAKER, ULTIPACE  58CM Model/Cat number:   [id number]    Serial number: [id number] : [employer]    Lot number: [id number] Device identifier: [id number]    Implant Date: 1/23/2025        GUDID Information       Request status Successful        Brand name: UltiPace™ Version/Model: [id number]    Company name: [employer] MRI safety info as of 1/23/25: MR   Conditional    Contains dry or latex rubber: No      GMDN P.T. name: Endocardial/interventricular septal pacing lead                As of 1/23/2025       Status: Implanted                      Other Cardiac Implant    Envelope, Antibacterial, Aigis Rx Tyrx, Absorbable, Med - [id number] -   Implanted        Inventory item: ENVELOPE, ANTIBACTERIAL, AIGIS RX TYRX, ABSORBABLE, MED   Model/Cat number: [id number]    : [employer] Lot number: [id number]    Device identifier: [id number]        GUDID Information       Request status Successful        Brand name: TYRX™ Absorbable Antibacterial Envelope - Medium   Version/Model: [id number]    Company name: [employer] MRI safety info as of 1/23/25: Labeling   does not contain MRI Safety Information    Contains dry or latex rubber: No      GMDN P.T. name: Implantable pulse generator  mesh bag, bioabsorbable                As of 1/23/2025       Status: Implanted                      Implant    Pacemaker, Generator, Dual Assurity Mri - [id number] - Implanted        Inventory item: PACEMAKER, GENERATOR, DUAL ASSURITY MRI Model/Cat number:   [id number]    Serial number: [id number] : [employer]    Lot number: [id number] Device identifier: [id number]      GUDID Information       Request status Successful        Brand name: Assolga MRI™ Version/Model: [id number]    Company name: [employer] MRI safety info as of 1/23/25: MR   Conditional    Contains dry or latex rubber: No      GMDN P.T. name: Dual-chamber implantable pacemaker, rate-responsive                As of 1/23/2025       Status: Implanted                            Anticoagulation/Antiplatelet Plan:   none    Estimated Blood Loss:   60 mL    Anesthesia: Moderate Sedation Anesthesia Staff: No anesthesia staff   entered.    Any Specimen(s) Removed:   No specimens collected during this procedure.    Disposition:   stable      Meds  Scheduled medications  amLODIPine, 2.5 mg, oral, Daily  aspirin, 81 mg, oral, Daily  clopidogrel, 75 mg, oral, Daily  ergocalciferol, 1,250 mcg, oral, Every Sunday  famotidine, 20 mg, oral, Daily   Or  famotidine, 20 mg, intravenous, Daily  finasteride, 5 mg, oral, Daily  heparin (porcine), 5,000 Units, subcutaneous, q12h  insulin glargine, 38 Units, subcutaneous, q24h  insulin lispro, 0-10 Units, subcutaneous, TID AC  metoprolol succinate XL, 50 mg, oral, BID  polyethylene glycol, 17 g, oral, Daily  tamsulosin, 0.4 mg, oral, Daily      Continuous medications     PRN medications  PRN medications: acetaminophen **OR** acetaminophen **OR** acetaminophen, acetaminophen **OR** acetaminophen **OR** acetaminophen, dextrose, dextrose, glucagon, glucagon, melatonin, ondansetron **OR** ondansetron, sennosides-docusate sodium, traMADol     Assessment and Plan    Tony King is a 69 y.o. male with past medical  history of CKD stage IIIb, hypertension, DVT, diabetes, hyperlipidemia, TIA, patent foramen ovale with closure procedure per prior notes, admitted to the hospital with bradycardia, right-sided weakness with acute CVA.     Second-degree AV block   Patient was transferred from [facility] to [facility] to be evaluated by electrophysiology.  Evaluated by Dr. Correa from electrophysiology services.   Patient with intermittent second-degree AV block  Cardiology placed pacemaker on 1/23/2025.  Patient will need to follow-up with cardiology with Dr. Correa, for wound check and device clinic follow-up as an outpatient.  Monitor.     Right sided weakness  Acute CVA  CT brain no acute intracranial finding  MRI brain revealed acute versus subacute infarct located in the left cerebral hemisphere in the territory PAULIE.  CT angiogram of the head and neck was performed.  Patient was started on aspirin and Plavix.    Tele stroke neurology recommended to continue with aspirin and Plavix for 90 days and aspirin after.  Patient was evaluated by Dr. Nelson from neurology services.  Echocardiogram was performed on 1/21/2025.  Patient has pacemaker placement stated above and can be monitored for arrhythmias as an outpatient with cardiology.  Patient has allergy which is listed as myalgias for statin.  Also history of myositis with elevated CPK, which did trend down to 570 on 1/20/2025.  Recommend to continue to evaluate for statin therapy with neurology as an outpatient to see if it would be warranted.  -Awaiting pre-CERT for acute rehab.     History of  myositis  Rhabdomyolysis  CK trend down  Hold statins     Type 1 diabetes mellitus  Fairly controlled, hemoglobin A1c 8.1  Increase Lantus to 38 units q. bedtime, cover with insulin sliding scale  Monitor close diabetic diet  Accu-Chek before meals and at bedtime.  Monitor.     Morbid obesity  BMI 32.13   advised regarding diet.     Acute kidney injury  Most likely secondary to  IV contrast  Avoid nephrotoxic drugs  Patient was challenged with fluids  Nephrology following.  Creatinine at 2.08 today; 2.19 on 1/27; 1.89 on 1/26.    Nephrology following and states his renal function remained stable and okay to discharge from renal standpoint.    History of patent foramen ovale status post closure  -On review of the notes from [facility] from 2/2017, there is mention of patient having history of PFO closure.  I did review patient's echo report from 1/20/25 and no mention of patent foramen ovale during that procedure.  Monitor.    Multinodular thyroid  Refer the patient to Dr. Loredo, endocrinologist, as outpatient for further evaluation.  Patient understood importance of following up with endocrinology        DVT prophylaxis  -Heparin subcu.      Deconditioning  PT /OT evaluated him and recommending high intensity level of care  Fall precaution  Ambulate with assistance     Disposition:  Plan is to discharge to acute rehab and waiting for pre-CERT.

## 2025-01-28 NOTE — CARE PLAN
Problem: Discharge Planning  Goal: Discharge to home or other facility with appropriate resources  Outcome: Progressing     Problem: Pain - Adult  Goal: Verbalizes/displays adequate comfort level or baseline comfort level  Outcome: Met     Problem: Safety - Adult  Goal: Free from fall injury  Outcome: Met     Problem: Chronic Conditions and Co-morbidities  Goal: Patient's chronic conditions and co-morbidity symptoms are monitored and maintained or improved  Outcome: Met     Problem: Fall/Injury  Goal: Not fall by end of shift  Outcome: Met  Goal: Be free from injury by end of the shift  Outcome: Met  Goal: Verbalize understanding of personal risk factors for fall in the hospital  Outcome: Met  Goal: Verbalize understanding of risk factor reduction measures to prevent injury from fall in the home  Outcome: Met  Goal: Use assistive devices by end of the shift  Outcome: Met  Goal: Pace activities to prevent fatigue by end of the shift  Outcome: Met   The patient's goals for the shift include  safety and comfort.     The clinical goals for the shift include Pt will remain hemodynamically stable throughout shift.

## 2025-01-28 NOTE — CARE PLAN
The patient's goals for the shift include no falls    The clinical goals for the shift include Pt will remain hemodynamically stable throughout shift

## 2025-01-29 LAB
ALBUMIN SERPL BCP-MCNC: 3.2 G/DL (ref 3.4–5)
ANION GAP SERPL CALCULATED.3IONS-SCNC: 9 MMOL/L (ref 10–20)
BUN SERPL-MCNC: 33 MG/DL (ref 6–23)
CALCIUM SERPL-MCNC: 7.9 MG/DL (ref 8.6–10.3)
CHLORIDE SERPL-SCNC: 105 MMOL/L (ref 98–107)
CO2 SERPL-SCNC: 24 MMOL/L (ref 21–32)
CREAT SERPL-MCNC: 1.98 MG/DL (ref 0.5–1.3)
EGFRCR SERPLBLD CKD-EPI 2021: 35 ML/MIN/1.73M*2
ERYTHROCYTE [DISTWIDTH] IN BLOOD BY AUTOMATED COUNT: 13.2 % (ref 11.5–14.5)
GLUCOSE BLD MANUAL STRIP-MCNC: 133 MG/DL (ref 74–99)
GLUCOSE BLD MANUAL STRIP-MCNC: 170 MG/DL (ref 74–99)
GLUCOSE BLD MANUAL STRIP-MCNC: 257 MG/DL (ref 74–99)
GLUCOSE BLD MANUAL STRIP-MCNC: 262 MG/DL (ref 74–99)
GLUCOSE BLD MANUAL STRIP-MCNC: 264 MG/DL (ref 74–99)
GLUCOSE SERPL-MCNC: 176 MG/DL (ref 74–99)
HCT VFR BLD AUTO: 40.6 % (ref 41–52)
HGB BLD-MCNC: 13.9 G/DL (ref 13.5–17.5)
MAGNESIUM SERPL-MCNC: 1.97 MG/DL (ref 1.6–2.4)
MCH RBC QN AUTO: 29.1 PG (ref 26–34)
MCHC RBC AUTO-ENTMCNC: 34.2 G/DL (ref 32–36)
MCV RBC AUTO: 85 FL (ref 80–100)
NRBC BLD-RTO: 0 /100 WBCS (ref 0–0)
PHOSPHATE SERPL-MCNC: 2.9 MG/DL (ref 2.5–4.9)
PLATELET # BLD AUTO: 189 X10*3/UL (ref 150–450)
POTASSIUM SERPL-SCNC: 4.2 MMOL/L (ref 3.5–5.3)
RBC # BLD AUTO: 4.78 X10*6/UL (ref 4.5–5.9)
SODIUM SERPL-SCNC: 134 MMOL/L (ref 136–145)
WBC # BLD AUTO: 7.4 X10*3/UL (ref 4.4–11.3)

## 2025-01-29 PROCEDURE — 2500000001 HC RX 250 WO HCPCS SELF ADMINISTERED DRUGS (ALT 637 FOR MEDICARE OP): Performed by: REGISTERED NURSE

## 2025-01-29 PROCEDURE — 1200000002 HC GENERAL ROOM WITH TELEMETRY DAILY

## 2025-01-29 PROCEDURE — 36415 COLL VENOUS BLD VENIPUNCTURE: CPT | Performed by: INTERNAL MEDICINE

## 2025-01-29 PROCEDURE — 82947 ASSAY GLUCOSE BLOOD QUANT: CPT

## 2025-01-29 PROCEDURE — 2500000004 HC RX 250 GENERAL PHARMACY W/ HCPCS (ALT 636 FOR OP/ED): Performed by: INTERNAL MEDICINE

## 2025-01-29 PROCEDURE — 84100 ASSAY OF PHOSPHORUS: CPT | Performed by: INTERNAL MEDICINE

## 2025-01-29 PROCEDURE — 85027 COMPLETE CBC AUTOMATED: CPT | Performed by: INTERNAL MEDICINE

## 2025-01-29 PROCEDURE — 2500000002 HC RX 250 W HCPCS SELF ADMINISTERED DRUGS (ALT 637 FOR MEDICARE OP, ALT 636 FOR OP/ED): Performed by: INTERNAL MEDICINE

## 2025-01-29 PROCEDURE — 97530 THERAPEUTIC ACTIVITIES: CPT | Mod: GP | Performed by: PHYSICAL THERAPIST

## 2025-01-29 PROCEDURE — 97110 THERAPEUTIC EXERCISES: CPT | Mod: GO,CO

## 2025-01-29 PROCEDURE — 80069 RENAL FUNCTION PANEL: CPT | Performed by: INTERNAL MEDICINE

## 2025-01-29 PROCEDURE — 99232 SBSQ HOSP IP/OBS MODERATE 35: CPT | Performed by: FAMILY MEDICINE

## 2025-01-29 PROCEDURE — 83735 ASSAY OF MAGNESIUM: CPT | Performed by: INTERNAL MEDICINE

## 2025-01-29 PROCEDURE — 2500000001 HC RX 250 WO HCPCS SELF ADMINISTERED DRUGS (ALT 637 FOR MEDICARE OP): Performed by: INTERNAL MEDICINE

## 2025-01-29 RX ADMIN — FINASTERIDE 5 MG: 5 TABLET, FILM COATED ORAL at 08:38

## 2025-01-29 RX ADMIN — INSULIN GLARGINE 38 UNITS: 100 INJECTION, SOLUTION SUBCUTANEOUS at 20:38

## 2025-01-29 RX ADMIN — AMLODIPINE BESYLATE 2.5 MG: 2.5 TABLET ORAL at 08:38

## 2025-01-29 RX ADMIN — POLYETHYLENE GLYCOL 3350 17 G: 17 POWDER, FOR SOLUTION ORAL at 08:37

## 2025-01-29 RX ADMIN — INSULIN LISPRO 4 UNITS: 100 INJECTION, SOLUTION INTRAVENOUS; SUBCUTANEOUS at 12:37

## 2025-01-29 RX ADMIN — HEPARIN SODIUM 5000 UNITS: 5000 INJECTION, SOLUTION INTRAVENOUS; SUBCUTANEOUS at 05:00

## 2025-01-29 RX ADMIN — METOPROLOL SUCCINATE 50 MG: 50 TABLET, EXTENDED RELEASE ORAL at 20:39

## 2025-01-29 RX ADMIN — TAMSULOSIN HYDROCHLORIDE 0.4 MG: 0.4 CAPSULE ORAL at 08:38

## 2025-01-29 RX ADMIN — HEPARIN SODIUM 5000 UNITS: 5000 INJECTION, SOLUTION INTRAVENOUS; SUBCUTANEOUS at 16:39

## 2025-01-29 RX ADMIN — CLOPIDOGREL BISULFATE 75 MG: 75 TABLET ORAL at 08:38

## 2025-01-29 RX ADMIN — METOPROLOL SUCCINATE 50 MG: 50 TABLET, EXTENDED RELEASE ORAL at 08:38

## 2025-01-29 RX ADMIN — INSULIN LISPRO 2 UNITS: 100 INJECTION, SOLUTION INTRAVENOUS; SUBCUTANEOUS at 16:38

## 2025-01-29 RX ADMIN — FAMOTIDINE 20 MG: 20 TABLET, FILM COATED ORAL at 08:37

## 2025-01-29 RX ADMIN — ASPIRIN 81 MG: 81 TABLET, CHEWABLE ORAL at 08:38

## 2025-01-29 ASSESSMENT — COGNITIVE AND FUNCTIONAL STATUS - GENERAL
STANDING UP FROM CHAIR USING ARMS: TOTAL
DRESSING REGULAR UPPER BODY CLOTHING: TOTAL
MOBILITY SCORE: 8
TOILETING: TOTAL
MOVING TO AND FROM BED TO CHAIR: TOTAL
MOVING FROM LYING ON BACK TO SITTING ON SIDE OF FLAT BED WITH BEDRAILS: TOTAL
HELP NEEDED FOR BATHING: TOTAL
WALKING IN HOSPITAL ROOM: TOTAL
MOBILITY SCORE: 6
EATING MEALS: A LITTLE
STANDING UP FROM CHAIR USING ARMS: TOTAL
MOVING FROM LYING ON BACK TO SITTING ON SIDE OF FLAT BED WITH BEDRAILS: A LOT
DRESSING REGULAR UPPER BODY CLOTHING: TOTAL
WALKING IN HOSPITAL ROOM: TOTAL
CLIMB 3 TO 5 STEPS WITH RAILING: TOTAL
PERSONAL GROOMING: TOTAL
DAILY ACTIVITIY SCORE: 6
PERSONAL GROOMING: A LOT
CLIMB 3 TO 5 STEPS WITH RAILING: TOTAL
DRESSING REGULAR LOWER BODY CLOTHING: TOTAL
MOVING FROM LYING ON BACK TO SITTING ON SIDE OF FLAT BED WITH BEDRAILS: TOTAL
TURNING FROM BACK TO SIDE WHILE IN FLAT BAD: TOTAL
DAILY ACTIVITIY SCORE: 6
WALKING IN HOSPITAL ROOM: TOTAL
MOVING TO AND FROM BED TO CHAIR: TOTAL
STANDING UP FROM CHAIR USING ARMS: TOTAL
TOILETING: TOTAL
PERSONAL GROOMING: TOTAL
MOVING TO AND FROM BED TO CHAIR: TOTAL
DRESSING REGULAR LOWER BODY CLOTHING: TOTAL
DRESSING REGULAR LOWER BODY CLOTHING: TOTAL
HELP NEEDED FOR BATHING: TOTAL
TURNING FROM BACK TO SIDE WHILE IN FLAT BAD: TOTAL
HELP NEEDED FOR BATHING: A LOT
MOBILITY SCORE: 6
TURNING FROM BACK TO SIDE WHILE IN FLAT BAD: A LOT
TOILETING: TOTAL
DAILY ACTIVITIY SCORE: 11
DRESSING REGULAR UPPER BODY CLOTHING: A LOT
EATING MEALS: TOTAL
CLIMB 3 TO 5 STEPS WITH RAILING: TOTAL
EATING MEALS: TOTAL

## 2025-01-29 ASSESSMENT — PAIN SCALES - GENERAL
PAINLEVEL_OUTOF10: 0 - NO PAIN

## 2025-01-29 ASSESSMENT — PAIN - FUNCTIONAL ASSESSMENT: PAIN_FUNCTIONAL_ASSESSMENT: 0-10

## 2025-01-29 NOTE — PROGRESS NOTES
Occupational Therapy    OT Treatment    Patient Name: Tony King  MRN: [id number]  Department: [facility] E  Room: 01/01-B  Today's Date: 1/29/2025  Time Calculation  Start Time: 1517  Stop Time: 1540  Time Calculation (min): 23 min        Assessment:  OT Assessment: pt limited by fatigue this date but is making gradual progress towards OT POC. Pt would benefit from continued OT services to increase activity tolerance, ROM, strentgh, endurance, and balance for ADL participation  Prognosis: Good  Barriers to Discharge Home: Cognition needs, Physical needs, Caregiver assistance  Caregiver Assistance: Caregiver assistance needed per identified barriers - however, level of patient's required assistance exceeds assistance available at home  Cognition Needs: Recollection or understanding of precautions/restrictions limited, Insight of patient limited regarding functional ability/needs  Physical Needs: 24hr mobility assistance needed, 24hr ADL assistance needed, High falls risk due to function or environment  Evaluation/Treatment Tolerance: Patient limited by fatigue  Medical Staff Made Aware: Yes  End of Session Communication: Bedside nurse  End of Session Patient Position: Bed, 3 rail up, Alarm off, not on at start of session (call light in reach and all needs met; nursing students in room to take vitals upon VIEIRA exit)  OT Assessment Results: Decreased ADL status, Decreased upper extremity range of motion, Decreased upper extremity strength, Decreased safe judgment during ADL, Decreased cognition, Decreased endurance, Decreased fine motor control, Decreased functional mobility, Decreased gross motor control, Decreased trunk control for functional activities  Prognosis: Good  Barriers to Discharge: Decreased caregiver support, Other (Comment) (cognition needs)  Evaluation/Treatment Tolerance: Patient limited by fatigue  Medical Staff Made Aware: Yes  Strengths: Attitude of self  Barriers to Participation:  Comorbidities  Plan:  Treatment Interventions: ADL retraining, Functional transfer training, UE strengthening/ROM, Endurance training, Equipment evaluation/education, Compensatory technique education  OT Frequency: 5 times per week  OT Discharge Recommendations: High intensity level of continued care  OT Recommended Transfer Status: Assist of 2  OT - OK to Discharge: Yes  Treatment Interventions: ADL retraining, Functional transfer training, UE strengthening/ROM, Endurance training, Equipment evaluation/education, Compensatory technique education    Subjective   Previous Visit Info:  OT Last Visit  OT Received On: 01/29/25  General:  General  Reason for Referral: pt is a 70 y/o male admitted  with bradycardia and heart block. s/p permanent pacemaker placement 1/23/25. MR brain revealed Acute or subacute infarcts located within the left cerebral  hemisphere in the territory of the [place].  impaired mobility  Referred By: Alton Lambert MD  Past Medical History Relevant to Rehab: necrotizing myopathy, DM1, CKD, essential HTN, DVT, PE, TIA/CVA; spinal stenosis; hyperlipidemia; OA  Co-Treatment: PT  Co-Treatment Reason: to maximize pt safety and outcomes  Prior to Session Communication: Bedside nurse  Patient Position Received: Bed, 3 rail up, Alarm off, not on at start of session  Preferred Learning Style: visual, verbal  General Comment: cleared by nurse for OT treatment; pt agreeable to therapy; LUE sling NOT donned upon arrival. + telemetry, purewick  Precautions:  Hearing/Visual Limitations: glasses  Medical Precautions: Fall precautions  Post-Surgical Precautions: Other (comment) (post-op pacemaker precautions; left UE in sling excpet for bathing/dressing)  Precautions Comment: education on LUE sling and precautions provided before bed mobility. Pt unable to restate why he needs to be donned but shakes head that he understands when explained. Pt will need thorough reeducation      Pain:  Pain Assessment  Pain  Assessment: 0-10  0-10 (Numeric) Pain Score: 0 - No pain    Objective    Cognition:  Cognition  Orientation Level: Oriented X4  Following Commands: Follows one step commands with repetition  Safety Judgment: Decreased awareness of need for safety precautions  Cognition Comments: pt delayed repsonse and processing; presents with expressive aphasia  Attention: Exceptions to WFL  Sustained Attention: Impaired  Memory: Exceptions to WFL  Short-Term Memory: Impaired  Insight: Moderate  Impulsive: Mildly  Processing Speed: Delayed  Coordination:  Movements are Fluid and Coordinated: No  Upper Body Coordination: RUE decreased timing and accuracy of movements  Activities of Daily Living:      UE Dressing  UE Dressing Level of Assistance: Maximum assistance  UE Dressing Comments: donned LUE sling before mobility with Max A while at bed level    LE Dressing  LE Dressing: Yes  Sock Level of Assistance: Dependent  LE Dressing Where Assessed: Edge of bed  LE Dressing Comments: dependent for LB dressing task due to L lateral lean, retropuslive, and fatigue from sitting on EOB    Bed Mobility/Transfers:   Bed Mobility  Bed Mobility: Yes  Bed Mobility 1  Bed Mobility 1: Supine to sitting  Level of Assistance 1: Maximum assistance, +2  Bed Mobility Comments 1: Max A x2 to bring BLEs to EOB ; pt able to slightly bring LEs to EOB with max tactile/verbal cueing and max A to fully bring BLEs to EOB. Max A to bring trunk up into sitting  Bed Mobility 2  Bed Mobility  2: Sitting to supine  Level of Assistance 2: Maximum assistance, +2  Bed Mobility Comments 2: Max A x2 to bring BLEs and trunk into supine; moderate verbal cues for hand placement and to not use LUE due to precautions  Bed Mobility 3  Bed Mobility 3: Scooting  Level of Assistance 3: Dependent  Bed Mobility Comments 3: dependent to scoot towards HOB with use of draw sheet    Transfers  Transfer: No (pt expressed fatigue while seated on EOB; unsafe at this time)    Functional  Mobility:  Functional Mobility  Functional Mobility Performed: No  Sitting Balance:  Static Sitting Balance  Static Sitting-Balance Support: Right upper extremity supported, Feet supported  Static Sitting-Level of Assistance: Minimum assistance  Static Sitting-Comment/Number of Minutes: Min A due to L lateral lean and retropuslive posterior lean; RUe holding onto bed rail for trunk stability; performed to increase activity tolerance and functional seated balance for ADL particpation  Dynamic Sitting Balance  Dynamic Sitting-Balance Support: Feet supported, Right upper extremity supported (alternating RUE from supported to unsupported; LUE in sling)  Dynamic Sitting-Level of Assistance: Minimum assistance  Dynamic Sitting-Balance: Forward lean, Reaching for objects, Reaching across midline  Dynamic Sitting-Comments: pt able to sit on EOB for ~7-8 minutes to increase functional seated balance for ADL participation. Pt engaged in therapeutic excerises to increase balance with min A for trunk control due to laterla and posterior lean; pt fatigued easily this date    Therapy/Activity:   Therapeutic Exercise  Therapeutic Exercise Performed: Yes  Therapeutic Exercise Activity 1: pt engaged in RUE 15 reps x1 of shoulder/elbow flexion/extension while corssing midline while seated on EOB to increase ROM, coordination, strentgh, endurance, and activity tolerance for ADL participation. completed with increased time and effort with mod verbal cues  Therapeutic Exercise Activity 2: pt engaged in RUE 15 reps x1 of elbow flexion/extension while seated on EOB to increase ROM, coordination, strentgh, endurance, and activity tolerance for ADL participation. completed with increased time and effort with mod verbal cues  Therapeutic Exercise Activity 3: pt engaged in RUE 15 reps x1 of scapular elevation/depression while seated on EOB to increase ROM, coordination, strentgh, endurance, and activity tolerance for ADL participation.  completed with increased time and effort with mod verbal cues    Therapeutic Activity  Therapeutic Activity Performed: Yes  Therapeutic Activity 1: pt engaged in therpauetic excerises with PT while seated on EOB to increase functional seated balance  Therapeutic Activity 2: education provided on LUE  precautions and sling due to pacemaker; pt gestured with head nod understanding and was able to verbalize 50% of understanding when asked about sling and LUE precautions      Outcome Measures:  [facility] Daily Activity  Putting on and taking off regular lower body clothing: Total  Bathing (including washing, rinsing, drying): A lot  Putting on and taking off regular upper body clothing: A lot  Toileting, which includes using toilet, bedpan or urinal: Total  Taking care of personal grooming such as brushing teeth: A lot  Eating Meals: A little  Daily Activity - Total Score: 11        Education Documentation  Body Mechanics, taught by ISHA Rodriguez at 1/29/2025  4:16 PM.  Learner: Patient  Readiness: Acceptance  Method: Explanation  Response: Needs Reinforcement  Comment: pt educated on OT POc, body mechanics, precautions, and balance/safety techniques during ADL retraining, bed mobility, and static/dyanmic seated balance    Precautions, taught by ISHA Rodriguez at 1/29/2025  4:16 PM.  Learner: Patient  Readiness: Acceptance  Method: Explanation  Response: Needs Reinforcement  Comment: pt educated on OT POc, body mechanics, precautions, and balance/safety techniques during ADL retraining, bed mobility, and static/dyanmic seated balance    ADL Training, taught by ISHA Rodriguez at 1/29/2025  4:16 PM.  Learner: Patient  Readiness: Acceptance  Method: Explanation  Response: Needs Reinforcement  Comment: pt educated on OT POc, body mechanics, precautions, and balance/safety techniques during ADL retraining, bed mobility, and static/dyanmic seated balance      Goals:  Encounter Problems       Encounter Problems  (Active)       OT Goals       Pt will complete self-feeding with mod indep. (Progressing)       Start:  01/24/25    Expected End:  02/24/25            Pt will complete all grooming tasks with close S to setup while seated. (Progressing)       Start:  01/24/25    Expected End:  02/24/25            Pt will complete UB dressing/bathing with setup and LB dressing/bathing with mod to min A using adaptive equipment as needed.  (Progressing)       Start:  01/24/25    Expected End:  02/24/25            Pt will complete all toileting tasks with mod to min A. (Progressing)       Start:  01/24/25    Expected End:  02/24/25            Pt will complete all functional transfers and mobility with min A using a RW. (Progressing)       Start:  01/24/25    Expected End:  02/24/25

## 2025-01-29 NOTE — NURSING NOTE
Assumed care of pt, pt awake during BSSR , pt has no pain , bed alarm on , bed low and locked call light within reach , pt has pacemaker paced on monitor, pt has possible dc today -waiting on insurance to accept , nursing student working with pt today

## 2025-01-29 NOTE — PROGRESS NOTES
"  Subjective    Patient denies chest pain, shortness of breath, nausea, vomiting or diarrhea.    Objective    Vitals  Visit Vitals  /71 (BP Location: Right arm, Patient Position: Lying)   Pulse 60   Temp 36.3 °C (97.3 °F) (Temporal)   Resp 15   Ht 1.956 m (6' 5\")   Wt 114 kg (251 lb 15.8 oz)   SpO2 99%   BMI 29.88 kg/m²   Smoking Status Never   BSA 2.49 m²       Physical Exam   General: Patient is alert.  NAD.   HEENT: Sclera intact.  CVS: RRR.  Lungs: CTAB.   Abdomen: Soft. NT. +BS.  Extremities: No pitting edema bilat ankles.  Psychiatric: Cooperative.  Neurologic:NIH stroke score of 2-3 (1 point for right leg drift and 1-2 points for aphasia).     IOs    Intake/Output Summary (Last 24 hours) at 1/29/2025 0948  Last data filed at 1/29/2025 0318  Gross per 24 hour   Intake 684 ml   Output 850 ml   Net -166 ml       Labs:   Results from last 72 hours   Lab Units 01/29/25  0405 01/28/25 0419 01/27/25  0420   SODIUM mmol/L 134* 135* 133*   POTASSIUM mmol/L 4.2 4.2 4.4   CHLORIDE mmol/L 105 107 104   CO2 mmol/L 24 24 23   BUN mg/dL 33* 37* 33*   CREATININE mg/dL 1.98* 2.08* 2.19*   GLUCOSE mg/dL 176* 154* 158*   CALCIUM mg/dL 7.9* 7.8* 7.6*   ANION GAP mmol/L 9* 8* 10   EGFR mL/min/1.73m*2 35* 33* 31*   PHOSPHORUS mg/dL 2.9 3.3 3.0      Results from last 72 hours   Lab Units 01/29/25  0405 01/28/25  0419 01/27/25  0420   WBC AUTO x10*3/uL 7.4 6.9 6.5   HEMOGLOBIN g/dL 13.9 13.8 14.5   HEMATOCRIT % 40.6* 40.4* 42.5   PLATELETS AUTO x10*3/uL 189 184 187      Lab Results   Component Value Date    CALCIUM 7.9 (L) 01/29/2025    PHOS 2.9 01/29/2025      Lab Results   Component Value Date    CRP 18.4 (H) 12/23/2022      [unfilled]       Images  Electrophysiology procedure  Table formatting from the original result was not included.  Procedure Date: 1/23/2025  Attending:    * Eloy Correa - Primary  Resident/Fellow/Other Assistant: Surgeons and Role:  * No surgeons found with a matching role *    Indications: "   Pre-op Diagnosis      * Generalized weakness [R53.1]     * Bradycardia [R00.1]     * HB (heart block) [I45.9]    Post-procedure diagnosis:   Post-op Diagnosis     * Generalized weakness [R53.1]     * Bradycardia [R00.1]     * HB (heart block) [I45.9]    Procedure(s):     * PPM IMPLANT DUAL    Procedure Findings:   See below    Description of the Procedure:   After written witnessed informed consent was obtained the procedure from   the patient, the patient was transferred to the EP laboratory. The patient   was in the fasting state. A grounding pad was placed. The patient was set   up for monitoring of surface ECG. The left upper chest was prepped and   draped in the usual sterile fashion. Bupivacaine was administered locally   for anesthetic. A 4 cm incision was placed at the left deltopectoral   groove.  A prepectoral pocket was made to accommodate the device generator. Left   subclavian vein access was obtained with a micropuncture kit for both   leads    The right ventricular lead was delivered to the RV via a peel away 7Fr   sheath to the RV mid septal region under fluoroscopic guidance. Position   was confirmed in the [place] and [employer]ENN projections. The helix was deployed, and   two way communication was set up with the device interrogator. The sensing   was 7 mv, with threshold for ventricular capture at 0.75V at  0.5 ms pulse   width, impedance was  610ohms. The lead was sutured at the suture sleeves   the pectoralis minor muscle. There was no phrenic nerve stimulation   maximal output.    The right atrial lead was delivered to the RA via a peel- away 7Fr sheath   to the RAA region under fluoroscopic guidance. Position was confirmed in   the [place] and PENN projections. The helix was deployed, and two way   communication was set up with the device interrogator. The sensing was   4.8mv, with threshold for atrial capture at 0.5V at  0.5 ms pulse width,   impedance was 410 ohms. The lead was sutured at the suture  sleeves the   pectoralis minor muscle. There was no phrenic nerve stimulation maximal   output.    Both leads with an attached to the generator header and placed in a   preformed generator pocket.   A Tyrex antibiotic pouch was utilized  The incision was closed with 3 layers of suture. A 0 Vicryl interrupted   layer, followed by a 3.0 B. V-loc continuous layer, and a 4.0 V-loc   continuous layer for the subcuticular layer. Steri Strips and a dressing   were applied.    Summary  Successful dual chamber pacemaker implant.     Complications:   none    Stents/Implants:   Implants       Pacemaker    Lead, Pacemaker, Ultipace 58cm - [id number] - Implanted        Inventory item: LEAD, PACEMAKER, ULTIPACE  58CM Model/Cat number:   [id number]    Serial number: [id number] : [employer]    Lot number: [id number] Device identifier: [id number]    Implant Date: 1/23/2025        GUDID Information       Request status Successful        Brand name: UltiPace™ Version/Model: [id number]    Company name: [employer] MRI safety info as of 1/23/25: MR   Conditional    Contains dry or latex rubber: No      GMDN P.T. name: Endocardial/interventricular septal pacing lead                As of 1/23/2025       Status: Implanted                      Other Cardiac Implant    Envelope, Antibacterial, Aigis Rx Tyrx, Absorbable, Med - [id number] -   Implanted        Inventory item: ENVELOPE, ANTIBACTERIAL, AIGIS RX TYRX, ABSORBABLE, MED   Model/Cat number: [id number]    : [employer] Lot number: [id number]    Device identifier: [id number]        GUDID Information       Request status Successful        Brand name: TYRX™ Absorbable Antibacterial Envelope - Medium   Version/Model: [id number]    Company name: [employer] MRI safety info as of 1/23/25: Labeling   does not contain MRI Safety Information    Contains dry or latex rubber: No      GMDN P.T. name: Implantable pulse generator mesh bag, bioabsorbable                 As of 1/23/2025       Status: Implanted                      Implant    Pacemaker, Generator, Dual Assurity Mri - [id number] - Implanted        Inventory item: PACEMAKER, GENERATOR, DUAL ASSURITY MRI Model/Cat number:   [id number]    Serial number: [id number] : [employer]    Lot number: [id number] Device identifier: [id number]      GUDID Information       Request status Successful        Brand name: Chao GRACIA™ Version/Model: [id number]    Company name: [employer] MRI safety info as of 1/23/25: MR   Conditional    Contains dry or latex rubber: No      GMDN P.T. name: Dual-chamber implantable pacemaker, rate-responsive                As of 1/23/2025       Status: Implanted                            Anticoagulation/Antiplatelet Plan:   none    Estimated Blood Loss:   60 mL    Anesthesia: Moderate Sedation Anesthesia Staff: No anesthesia staff   entered.    Any Specimen(s) Removed:   No specimens collected during this procedure.    Disposition:   stable      Meds  Scheduled medications  amLODIPine, 2.5 mg, oral, Daily  aspirin, 81 mg, oral, Daily  clopidogrel, 75 mg, oral, Daily  ergocalciferol, 1,250 mcg, oral, Every Sunday  famotidine, 20 mg, oral, Daily   Or  famotidine, 20 mg, intravenous, Daily  finasteride, 5 mg, oral, Daily  heparin (porcine), 5,000 Units, subcutaneous, q12h  insulin glargine, 38 Units, subcutaneous, q24h  insulin lispro, 0-10 Units, subcutaneous, TID AC  metoprolol succinate XL, 50 mg, oral, BID  polyethylene glycol, 17 g, oral, Daily  tamsulosin, 0.4 mg, oral, Daily      Continuous medications     PRN medications  PRN medications: acetaminophen **OR** acetaminophen **OR** acetaminophen, acetaminophen **OR** acetaminophen **OR** acetaminophen, dextrose, dextrose, glucagon, glucagon, melatonin, ondansetron **OR** ondansetron, sennosides-docusate sodium, traMADol     Assessment and Plan    Tony King is a 69 y.o. male with past medical history of CKD stage  IIIb, hypertension, DVT, diabetes, hyperlipidemia, TIA, patent foramen ovale with closure procedure per prior notes, admitted to the hospital with bradycardia, right-sided weakness with acute CVA.     Second-degree AV block   Patient was transferred from [facility] to [facility] to be evaluated by electrophysiology.  Evaluated by Dr. Correa from electrophysiology services.   Patient with intermittent second-degree AV block  Cardiology placed pacemaker on 1/23/2025.  Patient will need to follow-up with cardiology with Dr. Correa, for wound check and device clinic follow-up as an outpatient.  Monitor.     Right sided weakness  Acute CVA  CT brain no acute intracranial finding  MRI brain revealed acute versus subacute infarct located in the left cerebral hemisphere in the territory [place].  CT angiogram of the head and neck was performed.  Patient was started on aspirin and Plavix.    Tele stroke neurology recommended to continue with aspirin and Plavix for 90 days and aspirin after.  Patient was evaluated by Dr. Nelson from neurology services.  Echocardiogram was performed on 1/21/2025.  Patient has pacemaker placement stated above and can be monitored for arrhythmias as an outpatient with cardiology.  Patient has allergy which is listed as myalgias for statin.  Also history of myositis with elevated CPK, which did trend down to 570 on 1/20/2025.  Recommend to continue to evaluate for statin therapy with neurology as an outpatient to see if it would be warranted.  -Awaiting pre-CERT for acute rehab.     History of  myositis  Rhabdomyolysis  CK trend down  Hold statins     Type 1 diabetes mellitus  Fairly controlled, hemoglobin A1c 8.1  Increase Lantus to 38 units q. bedtime, cover with insulin sliding scale  Monitor close diabetic diet  Accu-Chek before meals and at bedtime.  Monitor.     Morbid obesity  BMI 32.13   advised regarding diet.     Acute kidney injury  Most likely secondary to IV contrast  Avoid  nephrotoxic drugs  Patient was challenged with fluids  Nephrology following.  Creatinine at 2.08 today; 2.19 on 1/27; 1.89 on 1/26.    Nephrology following and states his renal function remained stable and okay to discharge from renal standpoint.    History of patent foramen ovale status post closure  -On review of the notes from [facility] from 2/2017, there is mention of patient having history of PFO closure.  I did review patient's echo report from 1/20/25 and no mention of patent foramen ovale during that procedure.  Monitor.    Multinodular thyroid  Refer the patient to Dr. Loredo, endocrinologist, as outpatient for further evaluation.  Patient understood importance of following up with endocrinology        DVT prophylaxis  -Heparin subcu.      Deconditioning  PT /OT evaluated him and recommending high intensity level of care  Fall precaution  Ambulate with assistance     Disposition:  Plan is to discharge to acute rehab and waiting for pre-CERT.

## 2025-01-29 NOTE — PROGRESS NOTES
01/29/25 1131   Discharge Planning   Expected Discharge Disposition Rehab   Does the patient need discharge transport arranged? Yes   RoundTrip coordination needed? Yes     [facility] # [phone] to the attention of patient's [facility]  Ivanna.   [facility] transfer [facility] will review clinicals for [facility] rehab placement. Per Ivanna, patient is a two person assist and is aphasic so the VA will mostly not accept patient. Will await final decision from VA.  Precert for [facility] acute rehab is still pending.      Accepted by Thuan at [facility] which is in the patient's insurance network.  Sister's first choice is the [facility].  If the [facility] is unable to accept, will discuss alterative option of Thuan at [facility].     UPDATE:  Received an update from Ivanna at the [facility].  All clinical information was sent to [facility] SNF and is now under review.  Ivanna will continue to follow and will update this TCC in the AM.     UPDATE:  [facility] patient on Friday. Sister updated via phone and is agreeable.  The VA will fax paperwork to TCC office in the morning for sister to sign. Sister said she is available to come into hospital tomorrow after 1330 to sign paperwork.  Sister would like a call to confirm that the paperwork has arrived.

## 2025-01-29 NOTE — CARE PLAN
The patient's goals for the shift include  rest    The clinical goals for the shift include safety and rest

## 2025-01-29 NOTE — PROGRESS NOTES
Physical Therapy    Physical Therapy Treatment    Patient Name: Tony King  MRN: [id number]  Department: [address]  Room: 01/01-B  Today's Date: 1/29/2025  Time Calculation  Start Time: 1517  Stop Time: 1540  Time Calculation (min): 23 min         Assessment/Plan   PT Assessment  Barriers to Discharge Home: Physical needs, Caregiver assistance, Cognition needs  Caregiver Assistance: Caregiver assistance needed per identified barriers - however, level of patient's required assistance exceeds assistance available at home  Cognition Needs: Insight of patient limited regarding functional ability/needs, Cognition-related high falls risk  Physical Needs: 24hr mobility assistance needed  End of Session Communication: Bedside nurse  Assessment Comment: Pt made adequate progress toward his functional mobility goals. Pt required maxA x2 during functional mobility compared to his reported baseline. Pt would benefit from continued skilled PT services for maximizing independence and safety prior to & after discharge (MODERATE intensity).  End of Session Patient Position: Bed, 3 rail up, Alarm off, not on at start of session (call light and needs within reach. Student nurse entering room to obtain vitals.)  PT Plan  Inpatient/Swing Bed or Outpatient: Inpatient  PT Plan  Treatment/Interventions: Bed mobility, Transfer training, Gait training, Stair training, Balance training, Neuromuscular re-education, Strengthening, Endurance training, Therapeutic exercise  PT Plan: Ongoing PT  PT Frequency: 5 times per week  PT Discharge Recommendations: High intensity level of continued care  Equipment Recommended upon Discharge: Other (comment) (TBD)  PT Recommended Transfer Status: Assist x2, Assistive device, Other (comment) (use of drawsheet under buttocks with 3rd person blocking knees)  PT - OK to Discharge: Yes      General Visit Information:   PT  Visit  PT Received On: 01/29/25  General  Reason for Referral: Impaired functional mobility  due to decreased strength. Pt is a 68 y/o male admitted  with bradycardia and heart block. s/p permanent pacemaker placement 1/23/25. MR brain revealed Acute or subacute infarcts located within the left cerebral  hemisphere in the territory of the [place].  Past Medical History Relevant to Rehab: necrotizing myopathy, DM1, CKD, essential HTN, DVT, PE, TIA/CVA; spinal stenosis; hyperlipidemia; OA  Co-Treatment: OT  Co-Treatment Reason: to maximize pt safety and outcomes  Prior to Session Communication: Bedside nurse  Patient Position Received: Bed, 3 rail up, Alarm off, not on at start of session  General Comment: Cleared by RN for participation. Pt agreed to tx and was fully engaged throughout.    Subjective   Precautions:  Precautions  Hearing/Visual Limitations: glasses  Medical Precautions: Fall precautions  Post-Surgical Precautions:  (Pacemaker precautions.)     Date/Time Vitals Session Patient Position Pulse Resp SpO2 BP MAP (mmHg)    01/29/25 1500 --  --  --  16  98 %  158/76  99                 Objective   Pain:  Pain Assessment  0-10 (Numeric) Pain Score: 0 - No pain  Cognition:  Cognition  Overall Cognitive Status: Within Functional Limits  Following Commands: Follows one step commands with increased time  Cognition Comments: pt with expessive aphasia  Coordination:     Postural Control:  Static Sitting Balance  Static Sitting-Balance Support: Feet supported, Right upper extremity supported  Static Sitting-Level of Assistance: Minimum assistance (due to L sided lean)     Activity Tolerance:  Activity Tolerance  Endurance: Decreased tolerance for upright activites  Treatments:  Therapeutic Exercise  Seated EOB, BLE x10 each: AP, LA, march.    Bed Mobility  Bed Mobility: Yes  Bed Mobility 1  Bed Mobility 1: Supine to sitting  Level of Assistance 1: Maximum assistance, +2 (assist to BLE and trunk. HOB elevated ~40°, toward L side. Moderate VCs for sequencing.)  Bed Mobility 2  Bed Mobility  2: Sitting to  supine  Level of Assistance 2: Maximum assistance, +2 (to elevate BLE and control descent of trunk. Bed flat, toward L side. Moderate VCs for sequencing.)    Transfers  Transfer: No (deferred for maximal safey)    Outcome Measures:  [facility] Basic Mobility  Turning from your back to your side while in a flat bed without using bedrails: A lot  Moving from lying on your back to sitting on the side of a flat bed without using bedrails: A lot  Moving to and from bed to chair (including a wheelchair): Total  Standing up from a chair using your arms (e.g. wheelchair or bedside chair): Total  To walk in hospital room: Total  Climbing 3-5 steps with railing: Total  Basic Mobility - Total Score: 8    Education Documentation  Precautions, taught by Jessika Stearns PT at 1/29/2025  4:40 PM.  Learner: Patient  Readiness: Acceptance  Method: Explanation  Response: Needs Reinforcement  Comment: Fall precautions. See dwayne davison functional mobility.    Mobility Training, taught by Jessika Stearns PT at 1/29/2025  4:40 PM.  Learner: Patient  Readiness: Acceptance  Method: Explanation  Response: Needs Reinforcement  Comment: Fall precautions. See wdayne davison functional mobility.    Education Comments  No comments found.        OP EDUCATION:       Encounter Problems       Encounter Problems (Active)       Mobility       STG - Patient will ambulate 40' x 1 using rolling walker with CGA (Not Progressing)       Start:  01/24/25    Expected End:  02/07/25            STG - Patient will negotiate 3 stairs with 1 railing and MIN A (Not Progressing)       Start:  01/24/25    Expected End:  02/07/25               PT Transfers       STG - Patient to transfer to and from sit to supine with CGA (Progressing)       Start:  01/24/25    Expected End:  02/07/25            STG - Patient will transfer sit to and from stand with MIN A (Not Progressing)       Start:  01/24/25    Expected End:  02/07/25               Pain - Adult

## 2025-01-30 LAB
GLUCOSE BLD MANUAL STRIP-MCNC: 116 MG/DL (ref 74–99)
GLUCOSE BLD MANUAL STRIP-MCNC: 174 MG/DL (ref 74–99)
GLUCOSE BLD MANUAL STRIP-MCNC: 201 MG/DL (ref 74–99)
GLUCOSE BLD MANUAL STRIP-MCNC: 214 MG/DL (ref 74–99)
SARS-COV-2 RNA RESP QL NAA+PROBE: NOT DETECTED

## 2025-01-30 PROCEDURE — 2500000001 HC RX 250 WO HCPCS SELF ADMINISTERED DRUGS (ALT 637 FOR MEDICARE OP): Performed by: REGISTERED NURSE

## 2025-01-30 PROCEDURE — 97110 THERAPEUTIC EXERCISES: CPT | Mod: GP,CQ

## 2025-01-30 PROCEDURE — 97530 THERAPEUTIC ACTIVITIES: CPT | Mod: GP,CQ

## 2025-01-30 PROCEDURE — 1100000001 HC PRIVATE ROOM DAILY

## 2025-01-30 PROCEDURE — 2500000002 HC RX 250 W HCPCS SELF ADMINISTERED DRUGS (ALT 637 FOR MEDICARE OP, ALT 636 FOR OP/ED): Performed by: INTERNAL MEDICINE

## 2025-01-30 PROCEDURE — 2500000004 HC RX 250 GENERAL PHARMACY W/ HCPCS (ALT 636 FOR OP/ED): Performed by: INTERNAL MEDICINE

## 2025-01-30 PROCEDURE — 99233 SBSQ HOSP IP/OBS HIGH 50: CPT | Performed by: FAMILY MEDICINE

## 2025-01-30 PROCEDURE — 87635 SARS-COV-2 COVID-19 AMP PRB: CPT | Performed by: FAMILY MEDICINE

## 2025-01-30 PROCEDURE — 97530 THERAPEUTIC ACTIVITIES: CPT | Mod: GO,CO

## 2025-01-30 PROCEDURE — 82947 ASSAY GLUCOSE BLOOD QUANT: CPT

## 2025-01-30 PROCEDURE — 2500000001 HC RX 250 WO HCPCS SELF ADMINISTERED DRUGS (ALT 637 FOR MEDICARE OP): Performed by: INTERNAL MEDICINE

## 2025-01-30 RX ADMIN — CLOPIDOGREL BISULFATE 75 MG: 75 TABLET ORAL at 08:07

## 2025-01-30 RX ADMIN — METOPROLOL SUCCINATE 50 MG: 50 TABLET, EXTENDED RELEASE ORAL at 20:47

## 2025-01-30 RX ADMIN — HEPARIN SODIUM 5000 UNITS: 5000 INJECTION, SOLUTION INTRAVENOUS; SUBCUTANEOUS at 04:48

## 2025-01-30 RX ADMIN — HEPARIN SODIUM 5000 UNITS: 5000 INJECTION, SOLUTION INTRAVENOUS; SUBCUTANEOUS at 16:46

## 2025-01-30 RX ADMIN — TAMSULOSIN HYDROCHLORIDE 0.4 MG: 0.4 CAPSULE ORAL at 08:08

## 2025-01-30 RX ADMIN — INSULIN GLARGINE 38 UNITS: 100 INJECTION, SOLUTION SUBCUTANEOUS at 20:47

## 2025-01-30 RX ADMIN — FINASTERIDE 5 MG: 5 TABLET, FILM COATED ORAL at 08:08

## 2025-01-30 RX ADMIN — INSULIN LISPRO 2 UNITS: 100 INJECTION, SOLUTION INTRAVENOUS; SUBCUTANEOUS at 16:47

## 2025-01-30 RX ADMIN — AMLODIPINE BESYLATE 2.5 MG: 2.5 TABLET ORAL at 08:07

## 2025-01-30 RX ADMIN — INSULIN LISPRO 4 UNITS: 100 INJECTION, SOLUTION INTRAVENOUS; SUBCUTANEOUS at 12:20

## 2025-01-30 RX ADMIN — FAMOTIDINE 20 MG: 20 TABLET, FILM COATED ORAL at 08:08

## 2025-01-30 RX ADMIN — ASPIRIN 81 MG: 81 TABLET, CHEWABLE ORAL at 08:08

## 2025-01-30 RX ADMIN — POLYETHYLENE GLYCOL 3350 17 G: 17 POWDER, FOR SOLUTION ORAL at 08:07

## 2025-01-30 RX ADMIN — METOPROLOL SUCCINATE 50 MG: 50 TABLET, EXTENDED RELEASE ORAL at 08:08

## 2025-01-30 ASSESSMENT — COGNITIVE AND FUNCTIONAL STATUS - GENERAL
DAILY ACTIVITIY SCORE: 13
WALKING IN HOSPITAL ROOM: TOTAL
STANDING UP FROM CHAIR USING ARMS: A LOT
STANDING UP FROM CHAIR USING ARMS: A LOT
MOVING TO AND FROM BED TO CHAIR: A LOT
CLIMB 3 TO 5 STEPS WITH RAILING: TOTAL
MOVING FROM LYING ON BACK TO SITTING ON SIDE OF FLAT BED WITH BEDRAILS: A LOT
WALKING IN HOSPITAL ROOM: TOTAL
MOVING FROM LYING ON BACK TO SITTING ON SIDE OF FLAT BED WITH BEDRAILS: A LOT
DAILY ACTIVITIY SCORE: 11
DAILY ACTIVITIY SCORE: 13
HELP NEEDED FOR BATHING: A LOT
DRESSING REGULAR UPPER BODY CLOTHING: A LOT
TURNING FROM BACK TO SIDE WHILE IN FLAT BAD: A LOT
MOBILITY SCORE: 10
STANDING UP FROM CHAIR USING ARMS: A LOT
DRESSING REGULAR LOWER BODY CLOTHING: A LOT
HELP NEEDED FOR BATHING: A LOT
MOBILITY SCORE: 10
PERSONAL GROOMING: A LOT
DRESSING REGULAR LOWER BODY CLOTHING: A LOT
EATING MEALS: A LITTLE
TOILETING: A LOT
MOBILITY SCORE: 10
TOILETING: TOTAL
TURNING FROM BACK TO SIDE WHILE IN FLAT BAD: A LOT
DRESSING REGULAR UPPER BODY CLOTHING: A LOT
MOVING TO AND FROM BED TO CHAIR: A LOT
HELP NEEDED FOR BATHING: A LOT
TOILETING: A LOT
PERSONAL GROOMING: A LOT
MOVING FROM LYING ON BACK TO SITTING ON SIDE OF FLAT BED WITH BEDRAILS: A LOT
PERSONAL GROOMING: A LOT
EATING MEALS: A LITTLE
TURNING FROM BACK TO SIDE WHILE IN FLAT BAD: A LOT
MOVING TO AND FROM BED TO CHAIR: A LOT
WALKING IN HOSPITAL ROOM: TOTAL
DRESSING REGULAR UPPER BODY CLOTHING: A LOT
CLIMB 3 TO 5 STEPS WITH RAILING: TOTAL
DRESSING REGULAR LOWER BODY CLOTHING: TOTAL
CLIMB 3 TO 5 STEPS WITH RAILING: TOTAL
EATING MEALS: A LITTLE

## 2025-01-30 ASSESSMENT — PAIN SCALES - GENERAL
PAINLEVEL_OUTOF10: 0 - NO PAIN

## 2025-01-30 ASSESSMENT — PAIN - FUNCTIONAL ASSESSMENT
PAIN_FUNCTIONAL_ASSESSMENT: 0-10

## 2025-01-30 NOTE — CARE PLAN
The patient's goals for the shift include work w/ PT  Problem: Discharge Planning  Goal: Discharge to home or other facility with appropriate resources  Outcome: Progressing     Problem: Nutrition  Goal: Nutrient intake appropriate for maintaining nutritional needs  Outcome: Progressing     Problem: Fall/Injury  Goal: Not fall by end of shift  Outcome: Progressing  Goal: Be free from injury by end of the shift  Outcome: Progressing  Goal: Verbalize understanding of personal risk factors for fall in the hospital  Outcome: Progressing  Goal: Verbalize understanding of risk factor reduction measures to prevent injury from fall in the home  Outcome: Progressing  Goal: Use assistive devices by end of the shift  Outcome: Progressing  Goal: Pace activities to prevent fatigue by end of the shift  Outcome: Progressing     Problem: Cardiac catheterization  Goal: Free from dysrhythmias  Outcome: Progressing  Goal: Free from pain  Outcome: Progressing  Goal: No evidence of post procedure complications  Outcome: Progressing  Goal: Promote self management  Outcome: Progressing  Goal: Verbalize understanding of procedure  Outcome: Progressing  Goal: Care and maintenance of device (specify)  Outcome: Progressing     Problem: Skin  Goal: Prevent/manage excess moisture  Outcome: Progressing  Goal: Prevent/minimize sheer/friction injuries  Outcome: Progressing     Problem: Diabetes  Goal: Achieve decreasing blood glucose levels by end of shift  Outcome: Progressing  Goal: Increase stability of blood glucose readings by end of shift  Outcome: Progressing  Goal: Decrease in ketones present in urine by end of shift  Outcome: Progressing  Goal: Maintain electrolyte levels within acceptable range throughout shift  Outcome: Progressing  Goal: Maintain glucose levels >70mg/dl to <250mg/dl throughout shift  Outcome: Progressing  Goal: No changes in neurological exam by end of shift  Outcome: Progressing  Goal: Learn about and adhere to  nutrition recommendations by end of shift  Outcome: Progressing  Goal: Vital signs within normal range for age by end of shift  Outcome: Progressing  Goal: Increase self care and/or family involovement by end of shift  Outcome: Progressing  Goal: Receive DSME education by end of shift  Outcome: Progressing        The clinical goals for the shift include safety and stable VS

## 2025-01-30 NOTE — CARE PLAN
The patient's goals for the shift include      The clinical goals for the shift include safety and stable VS    Over the shift, the patient did not make progress toward the following goals. Barriers to progression include n/a pt progressing. Recommendations to address these barriers include continue with plan of care.

## 2025-01-30 NOTE — PROGRESS NOTES
Tony King is a 69 y.o. male on day 8 of admission presenting with Bradycardia.      Subjective   Denies chest pain or shortness of breath  Continues to have left-sided weakness     Objective     Last Recorded Vitals  /71 (BP Location: Right arm, Patient Position: Lying)   Pulse 60   Temp 36.6 °C (97.9 °F) (Oral)   Resp 16   Wt 114 kg (251 lb 15.8 oz)   SpO2 98%   Intake/Output last 3 Shifts:    Intake/Output Summary (Last 24 hours) at 1/30/2025 0840  Last data filed at 1/30/2025 0800  Gross per 24 hour   Intake 720 ml   Output 750 ml   Net -30 ml       Admission Weight  Weight: 123 kg (271 lb 2.7 oz) (01/22/25 1658)    Daily Weight  01/29/25 : 114 kg (251 lb 15.8 oz)    Image Results  Electrophysiology procedure  Table formatting from the original result was not included.  Procedure Date: 1/23/2025  Attending:    * Eloy Correa - Primary  Resident/Fellow/Other Assistant: Surgeons and Role:  * No surgeons found with a matching role *    Indications:   Pre-op Diagnosis      * Generalized weakness [R53.1]     * Bradycardia [R00.1]     * HB (heart block) [I45.9]    Post-procedure diagnosis:   Post-op Diagnosis     * Generalized weakness [R53.1]     * Bradycardia [R00.1]     * HB (heart block) [I45.9]    Procedure(s):     * PPM IMPLANT DUAL    Procedure Findings:   See below    Description of the Procedure:   After written witnessed informed consent was obtained the procedure from   the patient, the patient was transferred to the EP laboratory. The patient   was in the fasting state. A grounding pad was placed. The patient was set   up for monitoring of surface ECG. The left upper chest was prepped and   draped in the usual sterile fashion. Bupivacaine was administered locally   for anesthetic. A 4 cm incision was placed at the left deltopectoral   groove.  A prepectoral pocket was made to accommodate the device generator. Left   subclavian vein access was obtained with a micropuncture kit for both    leads    The right ventricular lead was delivered to the RV via a peel away 7Fr   sheath to the RV mid septal region under fluoroscopic guidance. Position   was confirmed in the Georgian and PENN projections. The helix was deployed, and   two way communication was set up with the device interrogator. The sensing   was 7 mv, with threshold for ventricular capture at 0.75V at  0.5 ms pulse   width, impedance was  610ohms. The lead was sutured at the suture sleeves   the pectoralis minor muscle. There was no phrenic nerve stimulation   maximal output.    The right atrial lead was delivered to the RA via a peel- away 7Fr sheath   to the RAA region under fluoroscopic guidance. Position was confirmed in   the Georgian and PENN projections. The helix was deployed, and two way   communication was set up with the device interrogator. The sensing was   4.8mv, with threshold for atrial capture at 0.5V at  0.5 ms pulse width,   impedance was 410 ohms. The lead was sutured at the suture sleeves the   pectoralis minor muscle. There was no phrenic nerve stimulation maximal   output.    Both leads with an attached to the generator header and placed in a   preformed generator pocket.   A Tyrex antibiotic pouch was utilized  The incision was closed with 3 layers of suture. A 0 Vicryl interrupted   layer, followed by a 3.0 B. V-loc continuous layer, and a 4.0 V-loc   continuous layer for the subcuticular layer. Steri Strips and a dressing   were applied.    Summary  Successful dual chamber pacemaker implant.     Complications:   none    Stents/Implants:   Implants       Pacemaker    Lead, Pacemaker, Ultipace 58cm - [id number] - Implanted        Inventory item: LEAD, PACEMAKER, ULTIPACE  58CM Model/Cat number:   [id number]    Serial number: [id number] : [employer]    Lot number: [id number] Device identifier: [id number]    Implant Date: 1/23/2025        GUDID Information       Request status Successful        Brand name:  UltiPace™ Version/Model: [id number]    Company name: [employer] MRI safety info as of 1/23/25: MR   Conditional    Contains dry or latex rubber: No      GMDN P.T. name: Endocardial/interventricular septal pacing lead                As of 1/23/2025       Status: Implanted                      Other Cardiac Implant    Envelope, Antibacterial, Aigis Rx Tyrx, Absorbable, Med - [id number] -   Implanted        Inventory item: ENVELOPE, ANTIBACTERIAL, AIGIS RX TYRX, ABSORBABLE, MED   Model/Cat number: [id number]    : [employer] Lot number: [id number]    Device identifier: [id number]        GUDID Information       Request status Successful        Brand name: TYRX™ Absorbable Antibacterial Envelope - Medium   Version/Model: [id number]    Company name: [employer] MRI safety info as of 1/23/25: Labeling   does not contain MRI Safety Information    Contains dry or latex rubber: No      GMDN P.T. name: Implantable pulse generator mesh bag, bioabsorbable                As of 1/23/2025       Status: Implanted                      Implant    Pacemaker, Generator, Dual Assurity Mri - [id number] - Implanted        Inventory item: PACEMAKER, GENERATOR, DUAL ASSURITY MRI Model/Cat number:   [id number]    Serial number: [id number] : [employer]    Lot number: [id number] Device identifier: [id number]      GUDID Information       Request status Successful        Brand name: Assurity MRI™ Version/Model: [id number]    Company name: [employer] MRI safety info as of 1/23/25: MR   Conditional    Contains dry or latex rubber: No      GMDN P.T. name: Dual-chamber implantable pacemaker, rate-responsive                As of 1/23/2025       Status: Implanted                            Anticoagulation/Antiplatelet Plan:   none    Estimated Blood Loss:   60 mL    Anesthesia: Moderate Sedation Anesthesia Staff: No anesthesia staff   entered.    Any Specimen(s) Removed:   No specimens collected during this  procedure.    Disposition:   stable      Physical Exam  Alert oriented x 3  Lungs clear to auscultation bilaterally  Heart regular rhythm  Abdomen soft nontender  Extremities no leg edema  CNS right -sided weakness  Diabetes mellitus, acute kidney injury    Assessment/Plan    Second-degree AV block status post pacemaker placement 1/23  Acute CVA with right-sided weakness  History of  myositis  Rhabdomyolysis  History of patent foramen ovale status post closure  Type 1 diabetes mellitus, hypertension    Plan:  [facility]  PT OT evaluation and treatment  DVT prophylaxis  Fall precaution and aspiration precautions        Esme Barrera MD

## 2025-01-30 NOTE — CONSULTS
Nutrition Assessement Note    Nutrition Assessment    Reason for Assessment: Length of stay    Reason for Hospital Admission:  Tony King is a 69 y.o. male who is admitted for bradycardia. Pt was transferred from [facility] to [facility] for pace maker implant. Pt reported that his appetite and PO intake are good. Pt denies the need for diet education. Pt will be discharged to SNF tomorrow.     Malnutrition Screening Tool (MST)  Have you recently lost weight without trying?: No  Weight Loss Score: 0  Have you been eating poorly because of a decreased appetite?: No  Malnutrition Score: 0  Nutrition Screen  Stage 3 or 4 Pressure Injury or Multiple Non-Healing Wounds: No  Home Tube Feeding or Total Parenteral Nutrition (TPN): No  Dietitian Consult Needed: No    Past Medical History:   Diagnosis Date    Chronic kidney disease, unspecified 09/26/2014    Chronic renal insufficiency    Essential (primary) hypertension 09/26/2014    Benign essential hypertension    History of deep venous thrombosis 12/22/2022    History of diabetes mellitus 04/05/2024    History of elevated lipids 04/05/2024    Patent foramen ovale ([facility]) 09/26/2014    PFO (patent foramen ovale)    Personal history of other endocrine, nutritional and metabolic disease 09/26/2014    History of diabetes mellitus    Personal history of other endocrine, nutritional and metabolic disease 09/26/2014    History of hyperlipidemia    Personal history of other venous thrombosis and embolism 09/26/2014    History of deep venous thrombosis    Personal history of pulmonary embolism 12/22/2022    Personal history of transient ischemic attack (TIA), and cerebral infarction without residual deficits 09/26/2014    History of stroke    Pressure sore on buttocks 11/15/2022      Past Surgical History:   Procedure Laterality Date    CARDIAC ELECTROPHYSIOLOGY PROCEDURE N/A 1/23/2025    Procedure: PPM IMPLANT DUAL;  Surgeon: Eloy Correa MD;  Location: [facility] Cardiac Cath  "Lab;  Service: Electrophysiology;  Laterality: N/A;  Angelito notified  would like time slot 12-12:30 if possible    US GUIDED PERCUTANEOUS PLACEMENT  7/20/2020    US GUIDED PERCUTANEOUS PLACEMENT [facility]       Nutrition History:  Food and Nutrient History: Pt reported good PO intake and appetite  Energy Intake: Good > 75 %     Food Allergies/Intolerances:  None    Anthropometrics:  Ht: 195.6 cm (6' 5\"), Wt: 114 kg (251 lb 15.8 oz), BMI: 29.88  IBW/kg (Dietitian Calculated): 94.55 kg  Percent of IBW: 120.67 %       Weight Change:  Daily Weight  01/29/25 : 114 kg (251 lb 15.8 oz)  01/22/25 : 123 kg (271 lb)  03/18/24 : 126 kg (278 lb)  01/25/24 : 132 kg (291 lb)  09/18/23 : 131 kg (288 lb)  06/21/23 : 124 kg (273 lb)  05/08/23 : 127 kg (279 lb)  04/26/23 : 122 kg (268 lb)  03/15/23 : 123 kg (272 lb)  12/01/22 : 118 kg (260 lb)     Weight History / % Weight Change: Pt reported his UBW is 273#. Weight i nchart fluctuates between 250-290#             Nutrition Focused Physical Exam Findings:                       Nutrition Significant Labs:  Lab Results   Component Value Date    WBC 7.4 01/29/2025    HGB 13.9 01/29/2025    HCT 40.6 (L) 01/29/2025     01/29/2025    CHOL 182 06/21/2023    TRIG 131 06/21/2023    HDL 38 (L) 06/21/2023    ALT 12 01/22/2025    AST 19 01/22/2025     (L) 01/29/2025    K 4.2 01/29/2025     01/29/2025    CREATININE 1.98 (H) 01/29/2025    BUN 33 (H) 01/29/2025    CO2 24 01/29/2025    TSH 2.01 01/22/2025    PSA 1.5 06/21/2023    INR 1.1 12/23/2022    HGBA1C 8.1 (H) 01/22/2025    ALBUR 115 (H) 06/21/2023     Nutrition Specific Medications:  amLODIPine, 2.5 mg, oral, Daily  aspirin, 81 mg, oral, Daily  clopidogrel, 75 mg, oral, Daily  ergocalciferol, 1,250 mcg, oral, Every Sunday  famotidine, 20 mg, oral, Daily   Or  famotidine, 20 mg, intravenous, Daily  finasteride, 5 mg, oral, Daily  heparin (porcine), 5,000 Units, subcutaneous, q12h  insulin glargine, 38 Units, " subcutaneous, q24h  insulin lispro, 0-10 Units, subcutaneous, TID AC  metoprolol succinate XL, 50 mg, oral, BID  polyethylene glycol, 17 g, oral, Daily  tamsulosin, 0.4 mg, oral, Daily        Dietary Orders (From admission, onward)       Start     Ordered    01/24/25 1008  Adult diet Consistent Carb, Potassium restricted 2 gm (50mEq); CCD 60 gm/meal  Diet effective now        Question Answer Comment   Diet type Consistent Carb    Diet type Potassium restricted 2 gm (50mEq)    Carb diet selection: CCD 60 gm/meal        01/24/25 1007    01/22/25 1743  May Participate in Room Service  ( ROOM SERVICE MAY PARTICIPATE)  Once        Question:  .  Answer:  Yes    01/22/25 1742                     Estimated Needs:   Estimated Energy Needs  Total Energy Estimated Needs in 24 hours (kCal): 2364 kCal  Energy Estimated Needs per kg Body Weight in 24 hours (kCal/kg): 25 kCal/kg  Method for Estimating Needs: IBW    Estimated Protein Needs  Total Protein Estimated Needs in 24 Hours (g): 95 g  Protein Estimated Needs per kg Body Weight in 24 Hours (g/kg): 1 g/kg  Method for Estimating 24 Hour Protein Needs: IBW    Estimated Fluid Needs  Total Fluid Estimated Needs in 24 Hours (mL): 2364 mL  Method for Estimating 24 Hour Fluid Needs: 1 mL/kcal        Nutrition Diagnosis   Nutrition Diagnosis:       Nutrition Diagnosis  Patient has Nutrition Diagnosis: Yes  Diagnosis Status (1): New  Nutrition Diagnosis 1: Altered nutrition related to laboratory values  Related to (1): physical causes  As Evidenced by (1): elevated a1c of 8.1%       Nutrition Interventions/Recommendations   Nutrition Interventions and Recommendations:  Nutrition Prescription: Nutrition prescription for oral nutrition    Nutrition Recommendations:  Individualized Nutrition Prescription Provided for : 2364 kcals, 95 g protein via diet    Nutrition Interventions/Goals:   Food and/or Nutrient Delivery Interventions  Interventions: Meals and snacks  Meals and Snacks:  Mineral-modified diet, Carbohydrate-modified diet  Goal: provide diet as ordered    Education Documentation  No documentation found.           Nutrition Monitoring and Evaluation   Monitoring/Evaluation:   Food/Nutrient Related History Monitoring  Monitoring and Evaluation Plan: Estimated Energy Intake  Estimated Energy Intake: Energy intake greater or equal to 75% of estimated energy needs         Biochemical Data, Medical Tests and Procedures  Monitoring and Evaluation Plan: Glucose/endocrine profile  Glucose/Endocrine Profile: Glucose within normal limits ( mg/dL), Hemoglobin A1c (HgbA1c)  Criteria: labs will trend towards desirable range              Goal Status: New goal(s) identified       Time Spent (min): 30 minutes

## 2025-01-30 NOTE — PROGRESS NOTES
Physical Therapy    Physical Therapy Treatment    Patient Name: Tony King  MRN: [id number]  Department: [address]  Room: [address] [place] [address]  Today's Date: 1/30/2025  Time Calculation  Start Time: 0945  Stop Time: 1015  Time Calculation (min): 30 min         Assessment/Plan   PT Assessment  Rehab Prognosis: Good  Barriers to Discharge Home: Physical needs, Caregiver assistance, Cognition needs  Caregiver Assistance: Caregiver assistance needed per identified barriers - however, level of patient's required assistance exceeds assistance available at home  Cognition Needs: Insight of patient limited regarding functional ability/needs, Cognition-related high falls risk  Physical Needs: 24hr mobility assistance needed  End of Session Communication: Bedside nurse  Assessment Comment: Pt continues to require MaxAx2 for all mobility. Able to perform 3 STS ths date, demoing improved activity tolerance. Pt would continue to benefit from skilled therapy services to address functional deficits.  End of Session Patient Position: Bed, 3 rail up, Alarm on  PT Plan  Inpatient/Swing Bed or Outpatient: Inpatient  PT Plan  Treatment/Interventions: Bed mobility, Transfer training, Gait training, Stair training, Balance training, Neuromuscular re-education, Strengthening, Endurance training, Therapeutic exercise  PT Plan: Ongoing PT  PT Frequency: 5 times per week  PT Discharge Recommendations: High intensity level of continued care  Equipment Recommended upon Discharge: Other (comment) (TBD)  PT Recommended Transfer Status: Assist x2, Assistive device, Other (comment) (use of drawsheet under buttocks with 3rd person blocking knees)  PT - OK to Discharge: Yes      General Visit Information:   PT  Visit  PT Received On: 01/30/25  General  Reason for Referral: Impaired functional mobility due to decreased strength. Pt is a 70 y/o male admitted  with bradycardia and heart block. s/p permanent pacemaker placement 1/23/25. MR brain revealed Acute or  subacute infarcts located within the left cerebral  hemisphere in the territory of the [place].  Co-Treatment: OT  Co-Treatment Reason: to maximize pt safety and outcomes  Prior to Session Communication: Bedside nurse  Patient Position Received: Bed, 3 rail up, Alarm on  General Comment: Pt cleared for PT by nursing. Pt agreeable to PT services.    Subjective   Precautions:  Precautions  Medical Precautions: Fall precautions, Other (comment) (pacemaker precautions, sling donned LUE throughout to maintain.)  Precautions Comment: Pt unable to state pacemaker precautions. Educated on precautions and their importance with pt verbalizing understanding.      Objective   Pain:  Pain Assessment  Pain Assessment: 0-10  0-10 (Numeric) Pain Score: 0 - No pain  Cognition:  Cognition  Overall Cognitive Status: Within Functional Limits    Postural Control:  Static Sitting Balance  Static Sitting-Balance Support: Feet supported, Right upper extremity supported  Static Sitting-Level of Assistance: Contact guard, Moderate assistance  Static Sitting-Comment/Number of Minutes: Poor seated static balance initially, progressing to fair +  Static Standing Balance  Static Standing-Balance Support: Right upper extremity supported  Static Standing-Level of Assistance: Maximum assistance  Static Standing-Comment/Number of Minutes: poor static stand balance    Treatments:  Therapeutic Exercise  Therapeutic Exercise Performed: Yes  Therapeutic Exercise Activity 1: Seated B LAQ x10  Therapeutic Exercise Activity 2: Seated B LAQ x10  Therapeutic Exercise Activity 3: Seated B hip abd/add with light resist x10  Therapeutic Exercise Activity 4: Seated B ankle pumps x5    Therapeutic Activity  Therapeutic Activity Performed: Yes  Therapeutic Activity 1: Seated at EOB for ~12 mins total. Initially requiring ModA to maintain midline, progressing to CGA.  Therapeutic Activity 2: Static stand at FWW x3 trials for ~1 min each. Cues for midline and LE  placement.    Bed Mobility  Bed Mobility: Yes  Bed Mobility 1  Bed Mobility 1: Supine to sitting, Sitting to supine  Level of Assistance 1: Maximum assistance, +2  Bed Mobility Comments 1: MaxAx2 to clear BLE over EOB and raise trunk. Cues for hand placement, maintaining pacemaker precautions, and weight shifting to maintain midline.  Bed Mobility 2  Bed Mobility  2: Scooting  Level of Assistance 2: Dependent  Bed Mobility Comments 2: Total assist with use of draw sheet to bring hips to EOB/scoot to HOB for optimal positioning.  Bed Mobility 3  Bed Mobility 3: Rolling right, Rolling left  Level of Assistance 3: Maximum assistance, +2  Bed Mobility Comments 3: MaxAx2 to initiate and maintain roll. Cues for RUE hand placement to assist with movement.    Ambulation/Gait Training  Ambulation/Gait Training Performed: No  Transfers  Transfer: Yes  Transfer 1  Transfer From 1: Sit to, Stand to  Transfer to 1: Sit, Stand  Technique 1: Sit to stand, Stand to sit  Transfer Device 1: Walker (R UE use only, LUE in sling)  Transfer Level of Assistance 1: Maximum assistance, +2  Trials/Comments 1: x3 trials. MaxAx2 to raise trunk. Cues for safe hand placement, full knee extension, and maintaining midline. Pt demos heavy R lateral lean this date with poor carryover of maintaining midline. Pt demos fair eccentric control. Pt only able to maintain stands for ~1 min.    Outcome Measures:  [facility] Basic Mobility  Turning from your back to your side while in a flat bed without using bedrails: A lot  Moving from lying on your back to sitting on the side of a flat bed without using bedrails: A lot  Moving to and from bed to chair (including a wheelchair): A lot  Standing up from a chair using your arms (e.g. wheelchair or bedside chair): A lot  To walk in hospital room: Total  Climbing 3-5 steps with railing: Total  Basic Mobility - Total Score: 10    Education Documentation  Handouts, taught by Rosa Browne PTA at 1/30/2025 12:02  PM.  Learner: Patient  Readiness: Acceptance  Method: Explanation, Demonstration  Response: Needs Reinforcement    Precautions, taught by Rosa Browne PTA at 1/30/2025 12:02 PM.  Learner: Patient  Readiness: Acceptance  Method: Explanation, Demonstration  Response: Needs Reinforcement    Body Mechanics, taught by Rosa Browne PTA at 1/30/2025 12:02 PM.  Learner: Patient  Readiness: Acceptance  Method: Explanation, Demonstration  Response: Needs Reinforcement    Home Exercise Program, taught by Rosa Browne PTA at 1/30/2025 12:02 PM.  Learner: Patient  Readiness: Acceptance  Method: Explanation, Demonstration  Response: Needs Reinforcement    Mobility Training, taught by Rosa Browne PTA at 1/30/2025 12:02 PM.  Learner: Patient  Readiness: Acceptance  Method: Explanation, Demonstration  Response: Needs Reinforcement    Education Comments  No comments found.        Encounter Problems       Encounter Problems (Active)       Mobility       STG - Patient will ambulate 40' x 1 using rolling walker with CGA (Not Progressing)       Start:  01/24/25    Expected End:  02/07/25            STG - Patient will negotiate 3 stairs with 1 railing and MIN A (Not Progressing)       Start:  01/24/25    Expected End:  02/07/25               PT Transfers       STG - Patient to transfer to and from sit to supine with CGA (Progressing)       Start:  01/24/25    Expected End:  02/07/25            STG - Patient will transfer sit to and from stand with MIN A (Progressing)       Start:  01/24/25    Expected End:  02/07/25               Pain - Adult

## 2025-01-30 NOTE — PROGRESS NOTES
"Occupational Therapy    OT Treatment    Patient Name: Tony King  MRN: [id number]  Department: [address]  Room: [facility]-  Today's Date: 1/30/2025  Time Calculation  Start Time: 0946  Stop Time: 1015  Time Calculation (min): 29 min        Assessment:  OT Assessment: Gradual progress made towards OT session. Continue current OT POC to increase balance, activity tolerance, strength, and indepence during ADL\"s.  Barriers to Discharge Home: Physical needs, Caregiver assistance, Cognition needs  End of Session Communication: Bedside nurse  End of Session Patient Position: Bed, 3 rail up, Alarm on  OT Assessment Results: Decreased ADL status, Decreased upper extremity range of motion, Decreased upper extremity strength, Decreased safe judgment during ADL, Decreased cognition, Decreased endurance, Decreased fine motor control, Decreased functional mobility, Decreased gross motor control, Decreased trunk control for functional activities  Barriers to Discharge: Decreased caregiver support (High fall risk, Max assist x2)  Plan:  Treatment Interventions: ADL retraining, Functional transfer training, UE strengthening/ROM, Endurance training, Equipment evaluation/education, Compensatory technique education  OT Frequency: 5 times per week  OT Discharge Recommendations: High intensity level of continued care, 24 hr supervision due to cognition  OT Recommended Transfer Status: Assist of 2  OT - OK to Discharge: Yes  Treatment Interventions: ADL retraining, Functional transfer training, UE strengthening/ROM, Endurance training, Equipment evaluation/education, Compensatory technique education    Subjective   Previous Visit Info:  OT Last Visit  OT Received On: 01/30/25  General:  General  Reason for Referral: Impaired ADL's due to decreased strength. Pt is a 68 y/o male admitted  with bradycardia and heart block. s/p permanent pacemaker placement 1/23/25. MR brain revealed Acute or subacute infarcts located within the left cerebral  " hemisphere in the territory of the [place]UL[place].  Past Medical History Relevant to Rehab: necrotizing myopathy, DM1, CKD, essential HTN, DVT, PE, TIA/CVA; spinal stenosis; hyperlipidemia; OA  Co-Treatment: PT  Co-Treatment Reason: To maximize pt safety and outcomes  Prior to Session Communication: Bedside nurse  Patient Position Received: Bed, 3 rail up, Alarm on  General Comment: Pt cleared for OT session per RN. Pt cooperative, LUE slinged donned upon arrival.  Precautions:  Medical Precautions: Fall precautions, Other (comment) (Pacemaker precautions, Aspiration precautions)  Precautions Comment: Pt unable to state pacemaker precautions. Educated on precautions and their importance with pt verbalizing understanding.     Date/Time Vitals Session Patient Position Pulse Resp SpO2 BP MAP (mmHg)    01/30/25 1542 --  --  59  16  99 %  155/63  88                 Pain:  Pain Assessment  Pain Assessment: 0-10  0-10 (Numeric) Pain Score: 0 - No pain  Clinical Progression: Not changed    Objective    Cognition:  Cognition  Overall Cognitive Status: Within Functional Limits  Safety/Judgement: Exceptions to WFL  Insight: Moderate  Impulsive: Mildly  Task Initiation: Initiates with cues    ADL's  Education encouragment provided, Pt continued to decline participation in all ADL's.   Bed Mobility/Transfers: Bed Mobility  Bed Mobility: Yes  Bed Mobility 1  Bed Mobility 1: Supine to sitting  Level of Assistance 1: Maximum assistance, +2  Bed Mobility Comments 1: Max assist x 2 required supine to sit for BLE over EOB and raise trunk.  Bed Mobility 2  Bed Mobility  2: Scooting  Level of Assistance 2: Dependent  Bed Mobility Comments 2: Total assist using drawsheet to scoot hips to EOB for positioning.  Bed Mobility 3  Bed Mobility 3: Rolling right, Rolling left  Level of Assistance 3: Maximum assistance, +2  Bed Mobility Comments 3: Pt progressed with use of RUE hand placement to bed rail with Max assist x2 for assist with  movement.    Transfers  Transfer: Yes  Transfer 1  Transfer From 1: Sit to  Transfer to 1: Stand  Technique 1: Sit to stand  Transfer Device 1: Walker (RUE only, LUE in sling.)  Transfer Level of Assistance 1: Maximum assistance, +2  Trials/Comments 1: From moderately elevated EOB height, Pt required 3 trials 1~ min for sit to stand transfer with Max assist x2 to raise trunk with occasional rest breaks for energy conservation. Verbal/ tactile cues for hand placement and feet positioning for safety with FWW to decrease risk of falls. Pt demonstrated lateral lean to right. Verbal/ tactile cues to improve trunk control to maximize funtional tolerance.  Transfers 2  Transfer From 2: Stand to  Transfer to 2: Sit, Bed  Technique 2: Stand to sit  Transfer Device 2: Walker  Transfer Level of Assistance 2: Maximum assistance, +2  Trials/Comments 2: Pt required Max assist x 2 to return back to bed, progressed with use of RUE hand placement to bed rail with Max assist x2 for rolling, repositioning and comfort.     Therapy/Activity: Therapeutic Activity  Therapeutic Activity Performed: Yes  Therapeutic Activity 1: Pt supine to sit, required Max assist x2 scoot hips to sit EOB at bed level to increase sitting balance and activity tolerance for 12~ mins to increase safety and functional tolerance. Pt required Mod assist sitting EOB maintaining midline initially to CGA progressing. Max assist x2 for STS transfer using FWW. 3 trials x 1~ min each with verbal/ tactile cues to maintain postural alignment. Pt returned to bed Max assist x2 repositioned and comfortable with all needs in reach.  :     Outcome Measures:[facility] Daily Activity  Putting on and taking off regular lower body clothing: Total  Bathing (including washing, rinsing, drying): A lot  Putting on and taking off regular upper body clothing: A lot  Toileting, which includes using toilet, bedpan or urinal: Total  Taking care of personal grooming such as brushing teeth: A  lot  Eating Meals: A little  Daily Activity - Total Score: 11        Education Documentation  ADL Training, taught by KRISTI Tamayo at 1/30/2025  3:44 PM.  Learner: Patient  Readiness: Acceptance  Method: Explanation, Demonstration  Response: Demonstrated Understanding, Needs Reinforcement    Handouts, taught by KRISTI Tamayo at 1/30/2025  3:44 PM.  Learner: Patient  Readiness: Acceptance  Method: Explanation, Demonstration  Response: Demonstrated Understanding, Needs Reinforcement    Precautions, taught by KRISTI Tamayo at 1/30/2025  3:44 PM.  Learner: Patient  Readiness: Acceptance  Method: Explanation, Demonstration  Response: Demonstrated Understanding, Needs Reinforcement    Body Mechanics, taught by KRISTI Tamayo at 1/30/2025  3:44 PM.  Learner: Patient  Readiness: Acceptance  Method: Explanation, Demonstration  Response: Demonstrated Understanding, Needs Reinforcement    Home Exercise Program, taught by KRISTI Tamayo at 1/30/2025  3:44 PM.  Learner: Patient  Readiness: Acceptance  Method: Explanation, Demonstration  Response: Demonstrated Understanding, Needs Reinforcement    Mobility Training, taught by KRISTI Tamayo at 1/30/2025  3:44 PM.  Learner: Patient  Readiness: Acceptance  Method: Explanation, Demonstration  Response: Demonstrated Understanding, Needs Reinforcement    Education Comments  Educated Pt on pacemaker precautions to increase safety awareness to maximize independence with balance, functional tolerance and ADL's.      OP EDUCATION:       Goals:  Encounter Problems       Encounter Problems (Active)       OT Goals       Pt will complete self-feeding with mod indep. (Progressing)       Start:  01/24/25    Expected End:  02/24/25            Pt will complete all grooming tasks with close S to setup while seated. (Progressing)       Start:  01/24/25    Expected End:  02/24/25            Pt will complete UB dressing/bathing with setup and LB  dressing/bathing with mod to min A using adaptive equipment as needed.  (Progressing)       Start:  01/24/25    Expected End:  02/24/25            Pt will complete all toileting tasks with mod to min A. (Progressing)       Start:  01/24/25    Expected End:  02/24/25            Pt will complete all functional transfers and mobility with min A using a RW. (Progressing)       Start:  01/24/25    Expected End:  02/24/25                 Treatment and documentation completed by COLT Tamayo under the direct Supervision of ISHA Irvin/LATRICE

## 2025-01-30 NOTE — NURSING NOTE
[facility] and was [facility] on [facility]/u time Friday 1/31.  [facility] will  pt at 0930. Pt will go to unit CT5. Nurse to Nurse Report can be called to [phone] ext [phone]. Will also pass on this info to Care Transitions.

## 2025-01-30 NOTE — NURSING NOTE
Bedside report obtained and assumed care. Pt verbally denies discomforts or current needs. Call light is in reach. Will continue with POC.

## 2025-01-30 NOTE — PROGRESS NOTES
[facility] Pt will dc on 1/31, transport already set up via the [facility]: [facility] picking him up at 0930. He will be on CT5 report number is [phone] ext [phone].     Update: [facility]. Also requesting Dr luna Cruz report to their Dr Barnett [phone], this will be relayed to attending .     01/30/25 6419   Discharge Planning   Expected Discharge Disposition SNF  ([facility])

## 2025-01-31 VITALS
RESPIRATION RATE: 16 BRPM | WEIGHT: 251.99 LBS | SYSTOLIC BLOOD PRESSURE: 152 MMHG | BODY MASS INDEX: 29.75 KG/M2 | TEMPERATURE: 98.1 F | HEART RATE: 59 BPM | OXYGEN SATURATION: 97 % | HEIGHT: 77 IN | DIASTOLIC BLOOD PRESSURE: 85 MMHG

## 2025-01-31 LAB
GLUCOSE BLD MANUAL STRIP-MCNC: 64 MG/DL (ref 74–99)
GLUCOSE BLD MANUAL STRIP-MCNC: 76 MG/DL (ref 74–99)

## 2025-01-31 PROCEDURE — 82947 ASSAY GLUCOSE BLOOD QUANT: CPT

## 2025-01-31 PROCEDURE — 2500000004 HC RX 250 GENERAL PHARMACY W/ HCPCS (ALT 636 FOR OP/ED): Performed by: INTERNAL MEDICINE

## 2025-01-31 PROCEDURE — 2500000001 HC RX 250 WO HCPCS SELF ADMINISTERED DRUGS (ALT 637 FOR MEDICARE OP): Performed by: INTERNAL MEDICINE

## 2025-01-31 PROCEDURE — 2500000001 HC RX 250 WO HCPCS SELF ADMINISTERED DRUGS (ALT 637 FOR MEDICARE OP): Performed by: REGISTERED NURSE

## 2025-01-31 PROCEDURE — 2500000002 HC RX 250 W HCPCS SELF ADMINISTERED DRUGS (ALT 637 FOR MEDICARE OP, ALT 636 FOR OP/ED): Performed by: INTERNAL MEDICINE

## 2025-01-31 PROCEDURE — 99239 HOSP IP/OBS DSCHRG MGMT >30: CPT | Performed by: FAMILY MEDICINE

## 2025-01-31 RX ORDER — ATORVASTATIN CALCIUM 40 MG/1
40 TABLET, FILM COATED ORAL DAILY
Start: 2025-01-31

## 2025-01-31 RX ADMIN — FINASTERIDE 5 MG: 5 TABLET, FILM COATED ORAL at 08:30

## 2025-01-31 RX ADMIN — POLYETHYLENE GLYCOL 3350 17 G: 17 POWDER, FOR SOLUTION ORAL at 08:30

## 2025-01-31 RX ADMIN — TAMSULOSIN HYDROCHLORIDE 0.4 MG: 0.4 CAPSULE ORAL at 08:30

## 2025-01-31 RX ADMIN — FAMOTIDINE 20 MG: 20 TABLET, FILM COATED ORAL at 08:30

## 2025-01-31 RX ADMIN — CLOPIDOGREL BISULFATE 75 MG: 75 TABLET ORAL at 08:30

## 2025-01-31 RX ADMIN — HEPARIN SODIUM 5000 UNITS: 5000 INJECTION, SOLUTION INTRAVENOUS; SUBCUTANEOUS at 06:00

## 2025-01-31 RX ADMIN — METOPROLOL SUCCINATE 50 MG: 50 TABLET, EXTENDED RELEASE ORAL at 08:30

## 2025-01-31 RX ADMIN — ASPIRIN 81 MG: 81 TABLET, CHEWABLE ORAL at 08:30

## 2025-01-31 RX ADMIN — AMLODIPINE BESYLATE 2.5 MG: 2.5 TABLET ORAL at 08:30

## 2025-01-31 NOTE — NURSING NOTE
Pt changed for incontinence of urine and stool.  Total linen changed. Pt bathed with soap ad water

## 2025-01-31 NOTE — PROGRESS NOTES
Pt will dc today, transport already set up via the [facility]: [facility] picking him up at 0930. He will be on CT5 report number is [phone] ext [phone]. Attending Dr is aware of need for Dr to Dr Report and has the information needed.      01/31/25 0817   Discharge Planning   Expected Discharge Disposition SNF  ([facility])

## 2025-01-31 NOTE — CARE PLAN
The patient's goals for the shift include      The clinical goals for the shift include safety and stable VS    Over the shift, the patient did not make progress toward the following goals. Barriers to progression include weakness. Recommendations to address these barriers include rest.

## 2025-01-31 NOTE — DISCHARGE SUMMARY
"Discharge Diagnosis  Second-degree AV block status post pacemaker placement 1/23  Acute CVA with right-sided weakness  History of  myositis  Rhabdomyolysis  History of patent foramen ovale status post closure  Type 1 diabetes mellitus, hypertension    Discharge Meds     Medication List      START taking these medications     acetaminophen 325 mg tablet; Commonly known as: Tylenol; Take 2 tablets   (650 mg) by mouth every 6 hours if needed for fever (temp greater than   38.0 C) (greater than or equal to 38 C) for up to 7 days.   famotidine 20 mg tablet; Commonly known as: Pepcid; Take 1 tablet (20   mg) by mouth once daily.   metoprolol succinate XL 50 mg 24 hr tablet; Commonly known as:   Toprol-XL; Take 1 tablet (50 mg) by mouth 2 times a day. Do not crush or   chew.     CHANGE how you take these medications     Levemir FlexTouch U100 Insulin 100 unit/mL (3 mL) pen; Generic drug:   insulin detemir; Inject 35 Units under the skin once daily at bedtime.   Take as directed per insulin instructions.; What changed: how much to   take, when to take this     CONTINUE taking these medications     amLODIPine 2.5 mg tablet; Commonly known as: Norvasc   aspirin 81 mg chewable tablet; Chew 1 tablet (81 mg) once daily.   BD Ultra-Fine Orig Pen Needle 29 gauge x 1/2\" needle; Generic drug: pen   needle 1/2\"   clopidogrel 75 mg tablet; Commonly known as: Plavix; Take 1 tablet (75   mg) by mouth once daily.   Colace 100 mg capsule; Generic drug: docusate sodium   Contour Next Test Strips strip; Generic drug: blood sugar diagnostic   Dexcom G6  misc; Generic drug: blood-glucose meter,continuous   Dexcom G6 Sensor device; Generic drug: blood-glucose sensor   Dexcom G6 Transmitter device; Generic drug: blood-glucose transmitter   device   ergocalciferol 1.25 MG (19049 UT) capsule; Commonly known as: Vitamin   D-2; Take 1 capsule (1,250 mcg) by mouth 1 (one) time per week.   finasteride 5 mg tablet; Commonly known as: Proscar; " Take 1 tablet (5   mg) by mouth once daily.   insulin lispro 100 unit/mL injection; Commonly known as: HumaLOG KwikPen   Insulin; Inject 20 Units under the skin once daily with breakfast AND 20   Units once daily at noon. Take with meals AND 20 Units once daily in the   evening. Take with meals. Take as directed per insulin instructions.   mirabegron 50 mg tablet extended release 24 hr 24 hr tablet; Commonly   known as: Myrbetriq   polyethylene glycol 1 gram packet; Commonly known as: Glycolax, Miralax   sennosides-docusate sodium 8.6-50 mg tablet; Commonly known as:   Linda-Colace   tamsulosin 0.4 mg 24 hr capsule; Commonly known as: Flomax; Take 1   capsule (0.4 mg) by mouth once daily.     STOP taking these medications     glucagon 1 mg injection; Commonly known as: Glucagen   HYDROcodone-acetaminophen 5-325 mg tablet; Commonly known as: Norco   irbesartan 300 mg tablet; Commonly known as: Avapro   ketoconazole 2 % cream; Commonly known as: NIZOral   KETOROLAC ORAL   labetalol 100 mg tablet; Commonly known as: Normodyne   meclizine 25 mg tablet; Commonly known as: Antivert   pantoprazole 40 mg EC tablet; Commonly known as: ProtoNix   traMADol 50 mg tablet; Commonly known as: Ultram     ASK your doctor about these medications     traMADol 50 mg tablet; Commonly known as: Ultram; Take 0.5 tablets (25   mg) by mouth every 8 hours if needed for severe pain (7 - 10) for up to 3   days.; Ask about: Should I take this medication?         Hospital Course   Tony King is a 69 y.o. male transferred from [facility] to [facility] concerning of AV block.  Patient was admitted at [facility] with generalized weakness.  He was too weak to ambulate.  Patient was admitted for further evaluation treatment.  He has history of necrotizing myopathy which required immunotherapy in the past.  Patient has type 1 diabetes mellitus and is on long-acting insulin and insulin sliding scale.  During hospital stay at  [facility] patient became bradycardic rapid response was called.  Patient was found to be unresponsive.  CT scan of the brain did not reveal acute intracranial findings, CTA head and neck no evidence of large vessel occlusion severe short segment stenosis in V1 segment of the right vertebral artery.  Patient had multinodular thyroid.  Telestroke team was contacted.  Patient was not found to be candidate for tenecteplase or any intervention.  MRI brain done on 1/21/2025 revealed acute or subacute infarct located within the left cerebral hemisphere in the territory of PAULIE.  At [facility] patient was evaluated by Dr. Nelson from nephrology neurology services.  During hospital stay patient had episode of AV block.  He was evaluated by Dr. Olivarez from cardiology services.  Diagnoses requested transfer to [facility] to be evaluated by electrophysiology for possible pacemaker implant.  When I evaluated the patient on admission he denied chest pain or shortness of breath.  Patient seem to be slightly weaker on the right side.  He denied blurry vision or loss of vision.  He denied headache.  Patient denied to have pain in upper or lower extremities.  Patient was started on aspirin and Plavix.  Recommendation for telestroke team was to continue with aspirin and Plavix for 90 days and continue with aspirin monotherapy after.  Patient denies fever or chills.  On admission he was found to have  rhabdomyolysis,  statin was discontinued.  At [facility] [place] when I evaluate the patient he has heart rate 72, normal sinus rhythm on monitor.  The patient required pacemaker placement on January 23, 2025.  Patient was treated with an dual antiplatelet therapy, he was evaluated by physical therapy who recommended short-term SNF.  The patient was eventually discharged to [facility] in stable condition.    Pertinent Physical Exam At Time of Discharge  Physical Exam  Alert oriented x 3  Lungs clear to auscultation bilaterally  Heart regular  rhythm  Abdomen soft nontender  Extremities no leg edema  CNS right -sided weakness  Diabetes mellitus, acute kidney injury     Outpatient Follow-Up  Future Appointments   Date Time Provider Department Center   2/11/2025  8:45 AM [id number] SANDI Barrera MD

## 2025-02-11 ENCOUNTER — APPOINTMENT (OUTPATIENT)
Dept: CARDIOLOGY | Facility: CLINIC | Age: 70
End: 2025-02-11
Payer: MEDICARE

## (undated) DEVICE — INTRODUCER SYSTEM, PRELUDE SNAP, SPLITTABLE, HEMOSTATIC, 6FR

## (undated) DEVICE — SUTURE, V-LOC, 4-0, 18 IN, P-12, 90 ABS

## (undated) DEVICE — Device

## (undated) DEVICE — MICROINTRODUCER KIT, VSI, 4FR X 40CM, STIFFEN

## (undated) DEVICE — COVER, EQUIPMENT, ZERO GRAVITY

## (undated) DEVICE — VEIN PICK, ACC SUP, 6541

## (undated) DEVICE — SUTURE, STRATAFIX, 3-0, SPIRAL MONOCRYL PLUS, UNDYED 20CM  SH